# Patient Record
Sex: MALE | Race: WHITE | NOT HISPANIC OR LATINO | Employment: OTHER | URBAN - METROPOLITAN AREA
[De-identification: names, ages, dates, MRNs, and addresses within clinical notes are randomized per-mention and may not be internally consistent; named-entity substitution may affect disease eponyms.]

---

## 2017-10-31 ENCOUNTER — ALLSCRIPTS OFFICE VISIT (OUTPATIENT)
Dept: OTHER | Facility: OTHER | Age: 82
End: 2017-10-31

## 2017-11-01 NOTE — PROGRESS NOTES
Assessment  1  Chronic kidney disease, stage 3 (585 3) (N18 3)   2  Hypertension (401 9) (I10)    Plan  Chronic kidney disease, stage 3    · (1) BASIC METABOLIC PROFILE; Status:Active; Requested for:13Nov2017;    Perform:[facility]; YJP:[id number]; Ordered; For:Chronic kidney disease, stage 3; Ordered By:Willie Aguila;  Chronic kidney disease, stage 3, Hypertension    · Follow-up visit in 4 Months Evaluation and Treatment  Follow-up  Status: Hold For -  Scheduling  Requested for: 31Oct2017   Ordered; For: Chronic kidney disease, stage 3, Hypertension; Ordered By: Asher Luz Performed:  Due: [id number]   · (1) CBC/ PLT (NO DIFF); Status:Active; Requested UPT:[id number]; Perform:[facility]; SOHEILA:[id number]; For:Chronic kidney disease, stage 3, Hypertension; Ordered By:Willie Aguila;   · (1) COMPREHENSIVE METABOLIC PANEL; Status:Active; Requested JKX:[id number]; Perform:[facility]; CLS:[id number]; For:Chronic kidney disease, stage 3, Hypertension; Ordered By:Willie Aguila;   · (1) MAGNESIUM; Status:Active; Requested LLE:[id number]; Perform:[facility]; ASR:[id number]; For:Chronic kidney disease, stage 3, Hypertension; Ordered By:Willie Aguila;   · (1) PHOSPHORUS; Status:Active; Requested HEW:[id number]; Perform:[facility]; AML:[id number]; For:Chronic kidney disease, stage 3, Hypertension; Ordered By:Willie Aguila;   · (1) URINALYSIS w URINE C/S REFLEX (will reflex a microscopy if leukocytes, occult  blood, or nitrites are not within normal limits); Status:Active; Requested MCM:[id number]; Perform:[facility]; ZSN:[id number];ZOOOXIU; For:Chronic kidney disease, stage 3, Hypertension; Ordered By:Willie Aguila;   · (1) URINE PROTEIN CREATININE RATIO; Status:Active; Requested NO:[id number]; Perform: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  Hypertension: BP is under acceptable control  However, will stop Lisinopril due to rise in creatinine  Continue with Metoprolol  Mineral and bone disorder: PTH and Vitamin D level are at goal    TIA, CAD s/p CABG, HLP  Stop Lisinopril  Repeat BMP in November 2017  Follow up in 4 months  The patient was counseled regarding diagnostic results,-- instructions for management,-- patient and family education,-- impressions  The treatment plan was reviewed with the patient/guardian  The patient/guardian understands and agrees with the treatment plan      Reason For Visit  Continued evaluation of CKD, hypertension, and associated complications  History of Present Illness  Mr Nomi Beck was last seen in the office back in October 2016  He reports that he has been doing relatively well over the past year and denies any recent hospitalizations or ER visits  He denies any current complaints except for knee pain  His medications are unchanged  Review of Systems    Constitutional: no fever,-- no chills,-- no anorexia-- and-- no fatigue  Integumentary: no rashes  Gastrointestinal: no abdominal pain,-- no constipation,-- no nausea,-- no diarrhea-- and-- no vomiting  Respiratory: cough, but-- no shortness of breath-- and-- not coughing up sputum  Cardiovascular: no orthopnea,-- no chest pain,-- no palpitations-- and-- no lower extremity edema     Musculoskeletal: joint pain  Neurological: no headache,-- no lightheadedness-- and-- no dizziness  Current Meds   1  Aspirin 81 MG TABS; TAKE 1 TABLET DAILY; Therapy: (Recorded:09Jul2013) to Recorded   2  Fish Oil 1200 MG Oral Capsule; take 1 capsule daily; Therapy: (Recorded:09Jul2013) to Recorded   3  Lisinopril 5 MG Oral Tablet; TAKE ONE TABLET BY MOUTH ONCE DAILY; Therapy: [id number] to (Evaluate:[id number])  Requested for: 14Apr2015; Last   Rx:14Apr2015 Ordered   4  Metoprolol Tartrate 50 MG Oral Tablet; TAKE 1 TABLET TWICE DAILY; Therapy: (Recorded:09Jul2013) to Recorded   5  Multi-Vitamin TABS; TAKE 1 TABLET DAILY; Therapy: (Recorded:09Jul2013) to Recorded   6  Plavix 75 MG Oral Tablet; Take 1 tablet daily Recorded   7  Pravastatin Sodium 20 MG Oral Tablet; TAKE 1 TABLET DAILY; Therapy: (Recorded:09Jul2013) to Recorded    Allergies  1  loratadine   2  nitrofurantoin   3  Penicillins   4  phenazopyridine    Vitals  Vital Signs    Recorded: [id number] 02:50PM Recorded: 20UHU7789 74:63EL   Systolic 845, LUE, Sitting    Diastolic 80, LUE, Sitting    Height  5 ft 5 in   Weight  134 lb    BMI Calculated  22 3   BSA Calculated  1 67     Physical Exam    Constitutional: General appearance: No acute distress, well appearing and well nourished  ENT: External ears and nose appear normal      Eyes: Anicteric sclerae  JVD:  No JVD present  Pulmonary: Respiratory effort: No increased work of breathing or signs of respiratory distress  -- Auscultation of lungs: Clear to auscultation  Cardiovascular: Distinct S1, S2, normal rate, regular rhythm  Abdomen:  Soft  Extremities:  No edema  Rash: No rash present     Neurologic: Non Focal      Psychiatric: Orientation to person, place, and time: Normal  -- and-- Mood and affect: Normal        Results/Data  Nephrology Flowsheet [id number] 04:14PM Marcelo Ernandez     Test Name Result Flag Reference   WBC 9 1     Hemoglobin 15 4     Hematocrit 44 9     Platelets 138 Sodium 139     Potassium 4 5     Chloride 100     Carbon Dioxide 28     Calcium 9 4     GLUCOSE 78     BUN 29     Serum Creatinine 2 19     GFR, NON-AFRICAN AMERICAN 26     Magnesium 2 2     Albumin 3 9     AST 16     ALT 26     Alkaline Phosphatase 75     PTH 27 7     25 OH VIT D 42     Urinalysis Date 10/30/17         Signatures   Electronically signed by : Ignacio Cervantes MD; Oct 31 2017  4:41PM EST                       (Author)

## 2017-11-13 DIAGNOSIS — N18.30 CHRONIC KIDNEY DISEASE, STAGE III (MODERATE) (HCC): ICD-10-CM

## 2018-01-12 VITALS
DIASTOLIC BLOOD PRESSURE: 80 MMHG | BODY MASS INDEX: 22.33 KG/M2 | SYSTOLIC BLOOD PRESSURE: 136 MMHG | WEIGHT: 134 LBS | HEIGHT: 65 IN

## 2018-03-01 DIAGNOSIS — N18.30 CHRONIC KIDNEY DISEASE, STAGE III (MODERATE) (HCC): ICD-10-CM

## 2018-03-01 DIAGNOSIS — I10 ESSENTIAL (PRIMARY) HYPERTENSION: ICD-10-CM

## 2018-04-01 DIAGNOSIS — N18.30 CHRONIC KIDNEY DISEASE, STAGE III (MODERATE) (HCC): ICD-10-CM

## 2018-04-26 ENCOUNTER — TELEPHONE (OUTPATIENT)
Dept: NEPHROLOGY | Facility: CLINIC | Age: 83
End: 2018-04-26

## 2018-04-26 NOTE — TELEPHONE ENCOUNTER
I called patient 3 times to schedule follow up appt with Dr Nkechi Beasley, sent patient a letter that we've been trying to reach him and to call office to schedule appt   4/26/18

## 2018-05-15 ENCOUNTER — OFFICE VISIT (OUTPATIENT)
Dept: NEPHROLOGY | Facility: CLINIC | Age: 83
End: 2018-05-15
Payer: MEDICARE

## 2018-05-15 VITALS
DIASTOLIC BLOOD PRESSURE: 76 MMHG | BODY MASS INDEX: 25.72 KG/M2 | WEIGHT: 131 LBS | SYSTOLIC BLOOD PRESSURE: 132 MMHG | HEIGHT: 60 IN

## 2018-05-15 DIAGNOSIS — N18.30 CHRONIC KIDNEY DISEASE, STAGE 3 (HCC): Primary | ICD-10-CM

## 2018-05-15 DIAGNOSIS — I10 BENIGN ESSENTIAL HYPERTENSION: ICD-10-CM

## 2018-05-15 PROCEDURE — 99213 OFFICE O/P EST LOW 20 MIN: CPT | Performed by: INTERNAL MEDICINE

## 2018-05-15 RX ORDER — AMOXICILLIN 500 MG
1 CAPSULE ORAL DAILY
COMMUNITY
End: 2021-12-08

## 2018-05-15 RX ORDER — IBUPROFEN 200 MG
600 CAPSULE ORAL
COMMUNITY
Start: 2012-01-05 | End: 2022-04-07 | Stop reason: HOSPADM

## 2018-05-15 RX ORDER — ASPIRIN 81 MG/1
1 TABLET ORAL DAILY
COMMUNITY
End: 2022-06-24 | Stop reason: ALTCHOICE

## 2018-05-15 RX ORDER — CLOPIDOGREL BISULFATE 75 MG/1
1 TABLET ORAL DAILY
COMMUNITY
End: 2021-12-08 | Stop reason: SDUPTHER

## 2018-05-15 RX ORDER — METOPROLOL TARTRATE 50 MG/1
1 TABLET, FILM COATED ORAL 2 TIMES DAILY
COMMUNITY
End: 2021-12-08 | Stop reason: SDUPTHER

## 2018-05-15 RX ORDER — PRAVASTATIN SODIUM 20 MG
1 TABLET ORAL DAILY
COMMUNITY
End: 2021-12-08 | Stop reason: SDUPTHER

## 2018-05-15 NOTE — PROGRESS NOTES
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  · He has no to minimal proteinuria  2  Hypertension: BP is at goal  Continue Metoprolol  3  Mineral and bone disorder: Check PTH and Vitamin D with next visit  · Ca and Phos are at goal      Patient Instructions   No change to medications today  Follow up in 6 months  SUBJECTIVE / INTERVAL HISTORY:  Mr Adrián Valdez was last seen in the office back in October 2017 at which time we stopped lisinopril due to a rise in his serum creatinine  Since that time, there have been no recent hospitalizations or ER visits  He had labs done in early May 2018 which showed stable renal function  He feels relatively well  His only complaint is leg weakness when he mows the lawn  PMH/PSH: HTN, HLP, TIA, CAD s/p CABG  ALLERGIES:   Allergies   Allergen Reactions    Loratadine     Nitrofurantoin     Penicillins     Phenazopyridine      REVIEW OF SYSTEMS:  Review of Systems   Constitutional: Negative for appetite change, fatigue and fever  Respiratory: Negative for cough and shortness of breath  Cardiovascular: Negative for chest pain and leg swelling  Gastrointestinal: Negative for diarrhea, nausea and vomiting  Genitourinary: Negative for dysuria and hematuria  Musculoskeletal: Positive for myalgias  Negative for arthralgias  Neurological: Negative for light-headedness       OBJECTIVE:  /76   Ht 5' (1 524 m)   Wt 59 4 kg (131 lb)   BMI 25 58 kg/m²   Current Weight: Weight - Scale: 59 4 kg (131 lb) Body mass index is 25 58 kg/m²  Physical Exam   Constitutional: He is oriented to person, place, and time  He appears well-developed and well-nourished  No distress  HENT:   Head: Normocephalic and atraumatic  Mouth/Throat: Mucous membranes are normal    Eyes: Conjunctivae are normal  No scleral icterus  Neck: Neck supple  No JVD present  Cardiovascular: Normal rate, regular rhythm and normal heart sounds  Pulmonary/Chest: Effort normal and breath sounds normal  No respiratory distress  Abdominal: Soft  Bowel sounds are normal  He exhibits no distension  Musculoskeletal: He exhibits no edema  Neurological: He is alert and oriented to person, place, and time  Skin: Skin is warm and dry  Psychiatric: He has a normal mood and affect  His behavior is normal      Medications:  Current Outpatient Prescriptions:     aspirin (ASPIRIN LOW DOSE) 81 mg EC tablet, Take 1 tablet by mouth daily, Disp: , Rfl:     calcium carbonate (OS-VIRGEN) 1250 (500 Ca) MG tablet, Take 600 mg by mouth, Disp: , Rfl:     clopidogrel (PLAVIX) 75 mg tablet, Take 1 tablet by mouth daily, Disp: , Rfl:     metoprolol tartrate (LOPRESSOR) 50 mg tablet, Take 1 tablet by mouth 2 (two) times a day, Disp: , Rfl:     Omega-3 Fatty Acids (FISH OIL) 1200 MG CAPS, Take 1 capsule by mouth daily, Disp: , Rfl:     pravastatin (PRAVACHOL) 20 mg tablet, Take 1 tablet by mouth daily, Disp: , Rfl:     Laboratory Results:  May 1, 2018:  Upc ratio 0 18, glucose 92, BUN 30, creatinine 1 83, sodium 137, potassium 4 4, chloride 104, carbon dioxide 28, calcium 9 2, albumin 3 7, alkaline phosphatase 78, bilirubin 0 5, AST 25, ALT 39, total protein 6 9, magnesium 2 3, phosphorus 3 0, hemoglobin 14 4, WBC 8 5, platelets 364

## 2018-05-15 NOTE — LETTER
May 15, 2018     Haider Moralez DO  [address] [place] [address]    Patient: Adriane Gomez   YOB: 1926   Date of Visit: 5/15/2018       Dear Dr Ludwig Jimenez: Thank you for referring Landon Thomas to me for evaluation  Below are my notes for this consultation  If you have questions, please do not hesitate to call me  I look forward to following your patient along with you  Sincerely,        Zora Ang MD        CC: MD Zora Judge MD  5/15/2018  4:16 PM  Sign at close encounter  [address] 80 y o  male MRN: [id number]  DATE: 05/15/18  Reason for visit: Continued evaluation of CKD    ASSESSMENT & PLAN:  1  Chronic kidney disease, stage IIIB :  · Mr Cuevas Prime baseline creatinine is around 1 7 to 1 8    · His most recent creatinine is 1 83 reflecting stable renal function  · 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  · He has no to minimal proteinuria  2  Hypertension: BP is at goal  Continue Metoprolol  3  Mineral and bone disorder: Check PTH and Vitamin D with next visit  · Ca and Phos are at goal      Patient Instructions   No change to medications today  Follow up in 6 months  SUBJECTIVE / INTERVAL HISTORY:  Mr Amilcar Salas was last seen in the office back in October 2017 at which time we stopped lisinopril due to a rise in his serum creatinine  Since that time, there have been no recent hospitalizations or ER visits  He had labs done in early May 2018 which showed stable renal function  He feels relatively well  His only complaint is leg weakness when he mows the lawn  PMH/PSH: HTN, HLP, TIA, CAD s/p CABG       ALLERGIES:   Allergies   Allergen Reactions    Loratadine     Nitrofurantoin     Penicillins     Phenazopyridine      REVIEW OF SYSTEMS:  Review of Systems Constitutional: Negative for appetite change, fatigue and fever  Respiratory: Negative for cough and shortness of breath  Cardiovascular: Negative for chest pain and leg swelling  Gastrointestinal: Negative for diarrhea, nausea and vomiting  Genitourinary: Negative for dysuria and hematuria  Musculoskeletal: Positive for myalgias  Negative for arthralgias  Neurological: Negative for light-headedness  OBJECTIVE:  /76   Ht 5' (1 524 m)   Wt 59 4 kg (131 lb)   BMI 25 58 kg/m²    Current Weight: Weight - Scale: 59 4 kg (131 lb) Body mass index is 25 58 kg/m²  Physical Exam   Constitutional: He is oriented to person, place, and time  He appears well-developed and well-nourished  No distress  HENT:   Head: Normocephalic and atraumatic  Mouth/Throat: Mucous membranes are normal    Eyes: Conjunctivae are normal  No scleral icterus  Neck: Neck supple  No JVD present  Cardiovascular: Normal rate, regular rhythm and normal heart sounds  Pulmonary/Chest: Effort normal and breath sounds normal  No respiratory distress  Abdominal: Soft  Bowel sounds are normal  He exhibits no distension  Musculoskeletal: He exhibits no edema  Neurological: He is alert and oriented to person, place, and time  Skin: Skin is warm and dry  Psychiatric: He has a normal mood and affect   His behavior is normal      Medications:  Current Outpatient Prescriptions:     aspirin (ASPIRIN LOW DOSE) 81 mg EC tablet, Take 1 tablet by mouth daily, Disp: , Rfl:     calcium carbonate (OS-VIRGEN) 1250 (500 Ca) MG tablet, Take 600 mg by mouth, Disp: , Rfl:     clopidogrel (PLAVIX) 75 mg tablet, Take 1 tablet by mouth daily, Disp: , Rfl:     metoprolol tartrate (LOPRESSOR) 50 mg tablet, Take 1 tablet by mouth 2 (two) times a day, Disp: , Rfl:     Omega-3 Fatty Acids (FISH OIL) 1200 MG CAPS, Take 1 capsule by mouth daily, Disp: , Rfl:     pravastatin (PRAVACHOL) 20 mg tablet, Take 1 tablet by mouth daily, Disp: , Rfl:     Laboratory Results:  May 1, 2018:  Upc ratio 0 18, glucose 92, BUN 30, creatinine 1 83, sodium 137, potassium 4 4, chloride 104, carbon dioxide 28, calcium 9 2, albumin 3 7, alkaline phosphatase 78, bilirubin 0 5, AST 25, ALT 39, total protein 6 9, magnesium 2 3, phosphorus 3 0, hemoglobin 14 4, WBC 8 5, platelets 762

## 2018-05-22 LAB
EXTERNAL ALBUMIN: 3.7
EXTERNAL ALK PHOS: 78
EXTERNAL ALT: 39
EXTERNAL AST: 25
EXTERNAL BICARBONATE: 28
EXTERNAL BUN: 30
EXTERNAL CALCIUM: 9.2
EXTERNAL CHLORIDE: 104
EXTERNAL CREATININE: 1.83
EXTERNAL EGFR: 32
EXTERNAL POTASSIUM: 4.4
EXTERNAL SODIUM: 137
HCT VFR BLD AUTO: 41.7 % (ref 36.5–49.3)
HGB BLD-MCNC: 14.4 G/DL (ref 12–17)
MAGNESIUM SERPL-MCNC: 2.3 MG/DL (ref 1.6–2.6)
PHOSPHATE SERPL-MCNC: 3 MG/DL (ref 2.3–4.1)
PLATELET # BLD AUTO: 169 THOUSANDS/UL (ref 149–390)
PROTEIN/CREAT RATIO (HISTORICAL): 0.18
WBC # BLD AUTO: 8.5 THOUSAND/UL

## 2018-10-01 DIAGNOSIS — N18.30 CHRONIC KIDNEY DISEASE, STAGE III (MODERATE) (HCC): ICD-10-CM

## 2018-11-28 ENCOUNTER — OFFICE VISIT (OUTPATIENT)
Dept: NEPHROLOGY | Facility: CLINIC | Age: 83
End: 2018-11-28
Payer: MEDICARE

## 2018-11-28 VITALS
BODY MASS INDEX: 25.52 KG/M2 | WEIGHT: 130 LBS | SYSTOLIC BLOOD PRESSURE: 134 MMHG | DIASTOLIC BLOOD PRESSURE: 80 MMHG | HEIGHT: 60 IN

## 2018-11-28 DIAGNOSIS — N18.30 CHRONIC KIDNEY DISEASE, STAGE 3 (HCC): Primary | ICD-10-CM

## 2018-11-28 DIAGNOSIS — I12.9 BENIGN HYPERTENSIVE CKD, STAGE 1-4 OR UNSPECIFIED CHRONIC KIDNEY DISEASE: ICD-10-CM

## 2018-11-28 PROCEDURE — 99213 OFFICE O/P EST LOW 20 MIN: CPT | Performed by: INTERNAL MEDICINE

## 2018-11-28 NOTE — PROGRESS NOTES
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  No proteinuria  · He has no new labs and I am unable to comment on his current renal function  I asked him to go for labs this week  2  Hypertension: BP is at goal with Metoprolol 50 mg BID  3  Mineral and bone disorder:  · Check PTH and Vitamin D this week  Patient Instructions   No change to medications  Check blood work now and in 6 months  Follow up in 6 months  SUBJECTIVE / INTERVAL HISTORY:  Mr Guy East was last seen in the office back in May 2018  He appears to be doing fairly well  He denies any acute complaints at this time  Unfortunately, he has not gone for the repeat lab work prior to the appointment  PMH/PSH: HTN, HLP, TIA, CAD s/p CABG  ALLERGIES:   Allergies   Allergen Reactions    Loratadine     Nitrofurantoin     Penicillins     Phenazopyridine      REVIEW OF SYSTEMS:  Review of Systems   Constitutional: Negative for appetite change, chills, fatigue and fever  Respiratory: Negative for cough and shortness of breath  Cardiovascular: Negative for chest pain and leg swelling  Gastrointestinal: Negative for abdominal pain, diarrhea, nausea and vomiting  Genitourinary: Negative for dysuria and hematuria  Musculoskeletal: Positive for arthralgias  Allergic/Immunologic: Negative for immunocompromised state  Neurological: Negative for light-headedness  OBJECTIVE:  /80   Ht 5' (1 524 m)   Wt 59 kg (130 lb)   BMI 25 39 kg/m²   Current Weight: Weight - Scale: 59 kg (130 lb) Body mass index is 25 39 kg/m²  Physical Exam   Constitutional: He is oriented to person, place, and time   He appears well-developed and well-nourished  HENT:   Head: Normocephalic and atraumatic  Mouth/Throat: Mucous membranes are normal    Eyes: Conjunctivae are normal  No scleral icterus  Neck: Neck supple  No JVD present  Cardiovascular: Normal rate, regular rhythm and normal heart sounds  Pulmonary/Chest: Effort normal and breath sounds normal    Abdominal: Soft  Bowel sounds are normal    Musculoskeletal: He exhibits no edema  Neurological: He is alert and oriented to person, place, and time  Skin: Skin is warm and dry  He is not diaphoretic  Psychiatric: He has a normal mood and affect  His behavior is normal  Judgment normal      Medications:  Current Outpatient Prescriptions:     aspirin (ASPIRIN LOW DOSE) 81 mg EC tablet, Take 1 tablet by mouth daily, Disp: , Rfl:     calcium carbonate (OS-VIRGEN) 1250 (500 Ca) MG tablet, Take 600 mg by mouth, Disp: , Rfl:     clopidogrel (PLAVIX) 75 mg tablet, Take 1 tablet by mouth daily, Disp: , Rfl:     metoprolol tartrate (LOPRESSOR) 50 mg tablet, Take 1 tablet by mouth 2 (two) times a day, Disp: , Rfl:     Omega-3 Fatty Acids (FISH OIL) 1200 MG CAPS, Take 1 capsule by mouth daily, Disp: , Rfl:     pravastatin (PRAVACHOL) 20 mg tablet, Take 1 tablet by mouth daily, Disp: , Rfl:     Laboratory Results:  No new labs

## 2018-11-28 NOTE — LETTER
November 28, 2018     Vincent Paolonehemias, DO  [address] [place] [address]    Patient: Favian Barone   YOB: 1926   Date of Visit: 11/28/2018       Dear Dr Ewa Donovan: Thank you for referring Fadi Lipoma to me for evaluation  Below are my notes for this consultation  If you have questions, please do not hesitate to call me  I look forward to following your patient along with you  Sincerely,        Cindy Cervantes MD        CC: No Recipients  Cindy Cervantes MD  11/28/2018 11:36 AM  Sign at close encounter  [address] 80 y o  male MRN: [id number]  DATE: 11/28/18  Reason for visit: Continued evaluation of CKD    ASSESSMENT & PLAN:  1  Chronic kidney disease, stage IIIB :  · Mr Jaida Torres baseline creatinine is around 1 7 to 1 8    · His CKD is felt to be due to 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  No proteinuria  · He has no new labs and I am unable to comment on his current renal function  I asked him to go for labs this week  2  Hypertension: BP is at goal with Metoprolol 50 mg BID  3  Mineral and bone disorder:  · Check PTH and Vitamin D this week  Patient Instructions   No change to medications  Check blood work now and in 6 months  Follow up in 6 months  SUBJECTIVE / INTERVAL HISTORY:  Mr Tayla Nicholas was last seen in the office back in May 2018  He appears to be doing fairly well  He denies any acute complaints at this time  Unfortunately, he has not gone for the repeat lab work prior to the appointment  PMH/PSH: HTN, HLP, TIA, CAD s/p CABG  ALLERGIES:   Allergies   Allergen Reactions    Loratadine     Nitrofurantoin     Penicillins     Phenazopyridine      REVIEW OF SYSTEMS:  Review of Systems   Constitutional: Negative for appetite change, chills, fatigue and fever  Respiratory: Negative for cough and shortness of breath      Cardiovascular: Negative for chest pain and leg swelling  Gastrointestinal: Negative for abdominal pain, diarrhea, nausea and vomiting  Genitourinary: Negative for dysuria and hematuria  Musculoskeletal: Positive for arthralgias  Allergic/Immunologic: Negative for immunocompromised state  Neurological: Negative for light-headedness  OBJECTIVE:  /80   Ht 5' (1 524 m)   Wt 59 kg (130 lb)   BMI 25 39 kg/m²    Current Weight: Weight - Scale: 59 kg (130 lb) Body mass index is 25 39 kg/m²  Physical Exam   Constitutional: He is oriented to person, place, and time  He appears well-developed and well-nourished  HENT:   Head: Normocephalic and atraumatic  Mouth/Throat: Mucous membranes are normal    Eyes: Conjunctivae are normal  No scleral icterus  Neck: Neck supple  No JVD present  Cardiovascular: Normal rate, regular rhythm and normal heart sounds  Pulmonary/Chest: Effort normal and breath sounds normal    Abdominal: Soft  Bowel sounds are normal    Musculoskeletal: He exhibits no edema  Neurological: He is alert and oriented to person, place, and time  Skin: Skin is warm and dry  He is not diaphoretic  Psychiatric: He has a normal mood and affect  His behavior is normal  Judgment normal      Medications:  Current Outpatient Prescriptions:     aspirin (ASPIRIN LOW DOSE) 81 mg EC tablet, Take 1 tablet by mouth daily, Disp: , Rfl:     calcium carbonate (OS-VIRGEN) 1250 (500 Ca) MG tablet, Take 600 mg by mouth, Disp: , Rfl:     clopidogrel (PLAVIX) 75 mg tablet, Take 1 tablet by mouth daily, Disp: , Rfl:     metoprolol tartrate (LOPRESSOR) 50 mg tablet, Take 1 tablet by mouth 2 (two) times a day, Disp: , Rfl:     Omega-3 Fatty Acids (FISH OIL) 1200 MG CAPS, Take 1 capsule by mouth daily, Disp: , Rfl:     pravastatin (PRAVACHOL) 20 mg tablet, Take 1 tablet by mouth daily, Disp: , Rfl:     Laboratory Results:  No new labs

## 2018-12-05 ENCOUNTER — TELEPHONE (OUTPATIENT)
Dept: NEPHROLOGY | Facility: CLINIC | Age: 83
End: 2018-12-05

## 2018-12-05 DIAGNOSIS — N18.30 CKD (CHRONIC KIDNEY DISEASE), STAGE III (HCC): Primary | ICD-10-CM

## 2018-12-05 NOTE — TELEPHONE ENCOUNTER
Mailed to pt    ----- Message from Jack Elise MD sent at 12/5/2018  2:21 PM EST -----  Please have patient repeat BMP in March 2018  No change to meds

## 2019-04-12 ENCOUNTER — TELEPHONE (OUTPATIENT)
Dept: NEPHROLOGY | Facility: CLINIC | Age: 84
End: 2019-04-12

## 2019-05-03 ENCOUNTER — TELEPHONE (OUTPATIENT)
Dept: NEPHROLOGY | Facility: CLINIC | Age: 84
End: 2019-05-03

## 2019-05-31 ENCOUNTER — OFFICE VISIT (OUTPATIENT)
Dept: NEPHROLOGY | Facility: CLINIC | Age: 84
End: 2019-05-31
Payer: MEDICARE

## 2019-05-31 VITALS
BODY MASS INDEX: 25.84 KG/M2 | SYSTOLIC BLOOD PRESSURE: 128 MMHG | DIASTOLIC BLOOD PRESSURE: 76 MMHG | HEIGHT: 60 IN | WEIGHT: 131.6 LBS

## 2019-05-31 DIAGNOSIS — N18.30 CKD (CHRONIC KIDNEY DISEASE), STAGE III (HCC): Primary | ICD-10-CM

## 2019-05-31 DIAGNOSIS — N18.30 CHRONIC KIDNEY DISEASE, STAGE 3 (HCC): ICD-10-CM

## 2019-05-31 PROCEDURE — 99213 OFFICE O/P EST LOW 20 MIN: CPT | Performed by: INTERNAL MEDICINE

## 2019-10-07 ENCOUNTER — TELEPHONE (OUTPATIENT)
Dept: NEPHROLOGY | Facility: CLINIC | Age: 84
End: 2019-10-07

## 2019-10-07 NOTE — TELEPHONE ENCOUNTER
I called and left message for patient to call back and schedule follow up appointment in November w/ Dr Chanell De Jesus

## 2019-11-19 DIAGNOSIS — N18.30 CKD (CHRONIC KIDNEY DISEASE), STAGE III (HCC): Primary | ICD-10-CM

## 2019-11-19 DIAGNOSIS — E55.9 VITAMIN D DEFICIENCY: ICD-10-CM

## 2019-11-19 DIAGNOSIS — E21.3 HYPERPARATHYROIDISM (HCC): ICD-10-CM

## 2019-11-19 LAB
CREAT ?TM UR-SCNC: 90.8 UMOL/L
EXT PROTEIN URINE: 19.6
PROT/CREAT UR: 0.22 MG/G{CREAT}

## 2020-01-17 DIAGNOSIS — N18.30 CKD (CHRONIC KIDNEY DISEASE), STAGE III (HCC): Primary | ICD-10-CM

## 2020-01-17 LAB
25(OH)D3 SERPL-MCNC: 49 NG/ML (ref 30–100)
PTH-INTACT SERPL-MCNC: 38.4 PG/ML

## 2020-01-20 ENCOUNTER — OFFICE VISIT (OUTPATIENT)
Dept: NEPHROLOGY | Facility: CLINIC | Age: 85
End: 2020-01-20
Payer: MEDICARE

## 2020-01-20 VITALS
BODY MASS INDEX: 26.11 KG/M2 | HEIGHT: 60 IN | WEIGHT: 133 LBS | DIASTOLIC BLOOD PRESSURE: 61 MMHG | HEART RATE: 57 BPM | SYSTOLIC BLOOD PRESSURE: 146 MMHG

## 2020-01-20 DIAGNOSIS — N18.30 CKD (CHRONIC KIDNEY DISEASE), STAGE III (HCC): Primary | ICD-10-CM

## 2020-01-20 LAB
EXT GLUCOSE BLD: 100
EXTERNAL BUN: 36
EXTERNAL CALCIUM: 9.2
EXTERNAL CHLORIDE: 101
EXTERNAL CO2: 28
EXTERNAL CREATININE: 2.1
EXTERNAL EGFR: 26
EXTERNAL POTASSIUM: 4.7
EXTERNAL SODIUM: 136

## 2020-01-20 PROCEDURE — 99213 OFFICE O/P EST LOW 20 MIN: CPT | Performed by: INTERNAL MEDICINE

## 2020-01-20 RX ORDER — MELATONIN
1000 DAILY
COMMUNITY
End: 2022-03-10

## 2020-01-20 NOTE — LETTER
January 20, 2020     Lennie Guerra DO  [address] [place] [address]    Patient: Roberto Casillas   YOB: 1926   Date of Visit: 1/20/2020       Dear Dr Rudy Dyer: Thank you for referring Sheila Porter to me for evaluation  Below are my notes for this consultation  If you have questions, please do not hesitate to call me  I look forward to following your patient along with you  Sincerely,        Guillermina Melo MD        CC: No Recipients  Guillermina Melo MD  1/20/2020  1:31 PM  Sign at close encounter  [address] 80 y o  male MRN: [id number]  DATE: 01/20/20  Reason for visit: Continued evaluation of CKD    ASSESSMENT & PLAN:  1  Chronic kidney disease, stage IIIB :  · Mr Rome Jim baseline creatinine is around 1 7 to 1 9    · His CKD is felt to be due to 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  No to minimal proteinuria  · His most recent SCr is up to 2 10 - I suspect that this may simply be a prerenal issue  · I encouraged him to stay hydrated and we will repeat labs in the beginning of Feb 2020  2  Hypertension:  · BP is acceptable given age with Metoprolol 50 mg BID  · Off Lisinopril since 10/2017    3  Mineral and bone disorder:  · PTH is at goal - 38 4 on 11/19/2019  · Vitamin D level is at goal - 49 in 11/19/2019  He is taking OTC Vitamin D 1000 units daily        Patient Instructions   Try to stay hydrated  Repeat BMP in 2 weeks  Follow up in 6 months  SUBJECTIVE / INTERVAL HISTORY:  Mr Edwin Manzo was last seen in the office back in May 2019  He denies any acute events since his last office visit  No recent hospitalizations or ER visits  He had recent cataract surgery  Repeat labs in on January 17 showed a serum creatinine of 2 1  He denies any nausea, vomiting, diarrhea  No NSAID use  PMH/PSH: HTN, HLP, TIA, CAD s/p CABG       ALLERGIES:   Allergies   Allergen Reactions  Loratadine     Nitrofurantoin     Penicillins     Phenazopyridine      REVIEW OF SYSTEMS:  Review of Systems   Constitutional: Negative for appetite change, fatigue and fever  Respiratory: Negative for cough and shortness of breath  Cardiovascular: Negative for chest pain and leg swelling  Gastrointestinal: Negative for abdominal pain, diarrhea, nausea and vomiting  Genitourinary: Negative for dysuria and hematuria  Musculoskeletal: Negative for arthralgias  Allergic/Immunologic: Negative for immunocompromised state  Neurological: Negative for dizziness and light-headedness  OBJECTIVE:  /61 (BP Location: Left arm, Patient Position: Sitting, Cuff Size: Standard)   Pulse 57   Ht 5' (1 524 m)   Wt 60 3 kg (133 lb)   BMI 25 97 kg/m²    Current Weight: Weight - Scale: 60 3 kg (133 lb) Body mass index is 25 97 kg/m²  Physical Exam   Constitutional: He is oriented to person, place, and time  He appears well-developed and well-nourished  No distress  HENT:   Head: Normocephalic and atraumatic  Mouth/Throat: Mucous membranes are normal    Eyes: Conjunctivae are normal  No scleral icterus  Neck: Neck supple  No JVD present  Cardiovascular: Normal rate, regular rhythm and normal heart sounds  Pulmonary/Chest: Effort normal and breath sounds normal  No respiratory distress  Abdominal: Soft  Bowel sounds are normal    Musculoskeletal: He exhibits no edema  Neurological: He is alert and oriented to person, place, and time  Skin: Skin is warm and dry  Psychiatric: He has a normal mood and affect   His behavior is normal  Judgment normal      Medications:  Current Outpatient Medications:     aspirin (ASPIRIN LOW DOSE) 81 mg EC tablet, Take 1 tablet by mouth daily, Disp: , Rfl:     calcium carbonate (OS-VIRGEN) 1250 (500 Ca) MG tablet, Take 600 mg by mouth, Disp: , Rfl:     cholecalciferol (VITAMIN D3) 1,000 units tablet, Take 1,000 Units by mouth daily, Disp: , Rfl:    clopidogrel (PLAVIX) 75 mg tablet, Take 1 tablet by mouth daily, Disp: , Rfl:     metoprolol tartrate (LOPRESSOR) 50 mg tablet, Take 1 tablet by mouth 2 (two) times a day, Disp: , Rfl:     Omega-3 Fatty Acids (FISH OIL) 1200 MG CAPS, Take 1 capsule by mouth daily, Disp: , Rfl:     pravastatin (PRAVACHOL) 20 mg tablet, Take 1 tablet by mouth daily, Disp: , Rfl:     Laboratory Results:  Results for orders placed or performed in visit on 17/59/42   Basic metabolic panel   Result Value Ref Range    SODIUM 136     POTASSIUM 4 7     CHLORIDE 101     CO2 28     BUN 36     CREATININE 2 10     Glucose 100     EXTERNAL CALCIUM 9 2     EXTERNAL EGFR 26

## 2020-01-20 NOTE — PROGRESS NOTES
NEPHROLOGY OFFICE PROGRESS NOTE   Ebonie Wakefield 80 y o  male MRN: [id number]  DATE: 01/20/20  Reason for visit: Continued evaluation of CKD    ASSESSMENT & PLAN:  1  Chronic kidney disease, stage IIIB :  · Mr Brianda Theodore baseline creatinine is around 1 7 to 1 9    · His CKD is felt to be due to 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  No to minimal proteinuria  · His most recent SCr is up to 2 10 - I suspect that this may simply be a prerenal issue  · I encouraged him to stay hydrated and we will repeat labs in the beginning of Feb 2020  2  Hypertension:  · BP is acceptable given age with Metoprolol 50 mg BID  · Off Lisinopril since 10/2017    3  Mineral and bone disorder:  · PTH is at goal - 38 4 on 11/19/2019  · Vitamin D level is at goal - 49 in 11/19/2019  He is taking OTC Vitamin D 1000 units daily        Patient Instructions   Try to stay hydrated  Repeat BMP in 2 weeks  Follow up in 6 months  SUBJECTIVE / INTERVAL HISTORY:  Mr Merritt Hatchet was last seen in the office back in May 2019  He denies any acute events since his last office visit  No recent hospitalizations or ER visits  He had recent cataract surgery  Repeat labs in on January 17 showed a serum creatinine of 2 1  He denies any nausea, vomiting, diarrhea  No NSAID use  PMH/PSH: HTN, HLP, TIA, CAD s/p CABG  ALLERGIES:   Allergies   Allergen Reactions    Loratadine     Nitrofurantoin     Penicillins     Phenazopyridine      REVIEW OF SYSTEMS:  Review of Systems   Constitutional: Negative for appetite change, fatigue and fever  Respiratory: Negative for cough and shortness of breath  Cardiovascular: Negative for chest pain and leg swelling  Gastrointestinal: Negative for abdominal pain, diarrhea, nausea and vomiting  Genitourinary: Negative for dysuria and hematuria  Musculoskeletal: Negative for arthralgias  Allergic/Immunologic: Negative for immunocompromised state  Neurological: Negative for dizziness and light-headedness  OBJECTIVE:  /61 (BP Location: Left arm, Patient Position: Sitting, Cuff Size: Standard)   Pulse 57   Ht 5' (1 524 m)   Wt 60 3 kg (133 lb)   BMI 25 97 kg/m²   Current Weight: Weight - Scale: 60 3 kg (133 lb) Body mass index is 25 97 kg/m²  Physical Exam   Constitutional: He is oriented to person, place, and time  He appears well-developed and well-nourished  No distress  HENT:   Head: Normocephalic and atraumatic  Mouth/Throat: Mucous membranes are normal    Eyes: Conjunctivae are normal  No scleral icterus  Neck: Neck supple  No JVD present  Cardiovascular: Normal rate, regular rhythm and normal heart sounds  Pulmonary/Chest: Effort normal and breath sounds normal  No respiratory distress  Abdominal: Soft  Bowel sounds are normal    Musculoskeletal: He exhibits no edema  Neurological: He is alert and oriented to person, place, and time  Skin: Skin is warm and dry  Psychiatric: He has a normal mood and affect   His behavior is normal  Judgment normal      Medications:  Current Outpatient Medications:     aspirin (ASPIRIN LOW DOSE) 81 mg EC tablet, Take 1 tablet by mouth daily, Disp: , Rfl:     calcium carbonate (OS-VIRGEN) 1250 (500 Ca) MG tablet, Take 600 mg by mouth, Disp: , Rfl:     cholecalciferol (VITAMIN D3) 1,000 units tablet, Take 1,000 Units by mouth daily, Disp: , Rfl:     clopidogrel (PLAVIX) 75 mg tablet, Take 1 tablet by mouth daily, Disp: , Rfl:     metoprolol tartrate (LOPRESSOR) 50 mg tablet, Take 1 tablet by mouth 2 (two) times a day, Disp: , Rfl:     Omega-3 Fatty Acids (FISH OIL) 1200 MG CAPS, Take 1 capsule by mouth daily, Disp: , Rfl:     pravastatin (PRAVACHOL) 20 mg tablet, Take 1 tablet by mouth daily, Disp: , Rfl:     Laboratory Results:  Results for orders placed or performed in visit on 86/23/70   Basic metabolic panel   Result Value Ref Range    SODIUM 136     POTASSIUM 4 7     CHLORIDE 101 CO2 28     BUN 36     CREATININE 2 10     Glucose 100     EXTERNAL CALCIUM 9 2     EXTERNAL EGFR 26

## 2020-04-02 ENCOUNTER — TELEPHONE (OUTPATIENT)
Dept: NEPHROLOGY | Facility: CLINIC | Age: 85
End: 2020-04-02

## 2020-10-20 LAB
EXT GLUCOSE BLD: 92
EXTERNAL ALBUMIN: 4.1
EXTERNAL ALK PHOS: 73
EXTERNAL ALT: 20
EXTERNAL AST: 18
EXTERNAL BICARBONATE: 26
EXTERNAL BUN: 35
EXTERNAL CALCIUM: 9.6
EXTERNAL CHLORIDE: 105
EXTERNAL CREATININE: 1.83
EXTERNAL EGFR: 31
EXTERNAL POTASSIUM: 4.3
EXTERNAL SODIUM: 139

## 2020-10-23 ENCOUNTER — TELEMEDICINE (OUTPATIENT)
Dept: NEPHROLOGY | Facility: CLINIC | Age: 85
End: 2020-10-23
Payer: MEDICARE

## 2020-10-23 VITALS
BODY MASS INDEX: 25.48 KG/M2 | DIASTOLIC BLOOD PRESSURE: 70 MMHG | WEIGHT: 129.8 LBS | HEIGHT: 60 IN | TEMPERATURE: 97.7 F | SYSTOLIC BLOOD PRESSURE: 132 MMHG

## 2020-10-23 DIAGNOSIS — I12.9 BENIGN HYPERTENSIVE CKD, STAGE 1-4 OR UNSPECIFIED CHRONIC KIDNEY DISEASE: ICD-10-CM

## 2020-10-23 DIAGNOSIS — N18.32 STAGE 3B CHRONIC KIDNEY DISEASE (HCC): Primary | ICD-10-CM

## 2020-10-23 PROCEDURE — 99213 OFFICE O/P EST LOW 20 MIN: CPT | Performed by: INTERNAL MEDICINE

## 2020-10-23 RX ORDER — MULTIVITAMIN
1 CAPSULE ORAL DAILY
COMMUNITY
End: 2022-06-24 | Stop reason: ALTCHOICE

## 2021-08-26 ENCOUNTER — OFFICE VISIT (OUTPATIENT)
Dept: NEPHROLOGY | Facility: CLINIC | Age: 86
End: 2021-08-26
Payer: MEDICARE

## 2021-08-26 ENCOUNTER — TELEPHONE (OUTPATIENT)
Dept: NEPHROLOGY | Facility: CLINIC | Age: 86
End: 2021-08-26

## 2021-08-26 VITALS
SYSTOLIC BLOOD PRESSURE: 128 MMHG | DIASTOLIC BLOOD PRESSURE: 72 MMHG | WEIGHT: 127.8 LBS | BODY MASS INDEX: 25.09 KG/M2 | HEIGHT: 60 IN

## 2021-08-26 DIAGNOSIS — E83.9 CHRONIC KIDNEY DISEASE-MINERAL AND BONE DISORDER: ICD-10-CM

## 2021-08-26 DIAGNOSIS — N18.32 STAGE 3B CHRONIC KIDNEY DISEASE (HCC): Primary | ICD-10-CM

## 2021-08-26 DIAGNOSIS — I12.9 BENIGN HYPERTENSIVE CKD, STAGE 1-4 OR UNSPECIFIED CHRONIC KIDNEY DISEASE: ICD-10-CM

## 2021-08-26 DIAGNOSIS — I50.20 HEART FAILURE WITH REDUCED EJECTION FRACTION (HCC): ICD-10-CM

## 2021-08-26 DIAGNOSIS — N18.9 CHRONIC KIDNEY DISEASE-MINERAL AND BONE DISORDER: ICD-10-CM

## 2021-08-26 DIAGNOSIS — M89.9 CHRONIC KIDNEY DISEASE-MINERAL AND BONE DISORDER: ICD-10-CM

## 2021-08-26 PROCEDURE — 99214 OFFICE O/P EST MOD 30 MIN: CPT | Performed by: INTERNAL MEDICINE

## 2021-08-26 RX ORDER — FUROSEMIDE 20 MG/1
20 TABLET ORAL DAILY
COMMUNITY
End: 2021-12-08 | Stop reason: SDUPTHER

## 2021-08-26 RX ORDER — VALSARTAN 40 MG/1
40 TABLET ORAL DAILY
COMMUNITY
End: 2021-12-08 | Stop reason: SDUPTHER

## 2021-08-26 RX ORDER — GLUCOSAMINE/CHONDR SU A SOD 750-600 MG
TABLET ORAL DAILY
COMMUNITY
End: 2022-03-10

## 2021-08-26 NOTE — PATIENT INSTRUCTIONS
No change to medications at this time  We will plan to see you back in 4 months  We can discuss the advanced care planning during that visit

## 2021-08-26 NOTE — LETTER
August 26, 2021     Trey Chung DO  [address] [address]    Patient: Sola Benitez   YOB: 1926   Date of Visit: 8/26/2021       Dear Dr Neeraj Melvin: Thank you for referring Melissa Nichole to me for evaluation  Below are my notes for this consultation  If you have questions, please do not hesitate to call me  I look forward to following your patient along with you  Sincerely,        Don Vickers MD        CC: MD Don Greene MD  8/26/2021 12:31 PM  Sign when Signing Visit  NEPHROLOGY OFFICE PROGRESS NOTE   Sola Benitez 80 y o  male MRN: [id number]  DATE: 08/26/21  Reason for visit: Continued evaluation and management of CKD    ASSESSMENT & PLAN:  1  Chronic kidney disease, stage IIIB :  · Mr Yara Hughes baseline creatinine was around 1 9    · However, his creatinine has settled at around 1 9 to 2 3 since being started on Valsartan and Furosemide in November 2020  · His CKD is felt to be due to 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 and now complicated by cardiorenal syndrome  · His creatinine is 2 14 which remains stable over the past 8 months since starting the ARB and diuretic  However, he is now considered to have stage IV CKD with the recent change  · Given his cardiomyopathy, there is probably more benefit in keeping him on the ARB and diuretic  · We discussed ACM planning today but he appears rather busy with his potential move to senior living  I would like to revisit the ACM with his next visit  I called his son, Gurvinder Bowles, to discuss this and had to leave a message  2  Hypertension:  · BP is controlled  · Current regimen: Metoprolol 50 mg BID, valsartan 40 mg OD, furosemide 20 mg OD  · No changes  3  Congestive heart failure:  · On Furosemide 20 mg OD, and Valsartan 40 mg OD  · Continue same regimen  · Follows with Dr Rufino Marlow       4  Mineral and bone disorder:  · PTH is at goal - 41 5 in Aug 2021  · Ca and phos are at goal    · Vitamin D level is at goal - 70 in Aug 2021  He is taking OTC Vitamin D 1000 units daily        Patient Instructions   No change to medications at this time  We will plan to see you back in 4 months  We can discuss the advanced care planning during that visit  SUBJECTIVE / INTERVAL HISTORY:  Mr Tory Johnson was last seen in October 2020  Since that time, he has followed closely with Dr Leighton Mendoza  An echocardiogram done on November 21, 2020 revealed an ejection fraction of 25%  A stress test on October 22, 2020 revealed an ejection fraction of 33%  He was started on furosemide 20 mg daily and valsartan 40 mg daily during a visit with Dr Leighton Mendoza on November 24, 2020  Since that time, Mr Tory Johnson does report improvement in his exertional dyspnea  There have been no recent hospitalizations or ER visits  He denies any acute complaints at this time  He reports home blood pressure readings in the 120s over 70s  He and his wife are moving soon to [facility] living in [place]  Since December 2020, his creatinine has been around 1 95-2  34  PMH/PSH: HTN, HLP, TIA, CAD s/p CABG, CHF    ALLERGIES:   Allergies   Allergen Reactions    Loratadine     Nitrofurantoin     Penicillins     Phenazopyridine      REVIEW OF SYSTEMS:  Review of Systems   Constitutional: Negative for appetite change, chills, fatigue and fever  Respiratory: Positive for shortness of breath (improved)  Negative for cough  Cardiovascular: Negative for chest pain and leg swelling  Gastrointestinal: Negative for abdominal pain, diarrhea, nausea and vomiting  Genitourinary: Negative for dysuria and hematuria  Musculoskeletal: Negative for arthralgias  Neurological: Negative for dizziness and light-headedness       OBJECTIVE:  /72   Ht 5' (1 524 m)   Wt 58 kg (127 lb 12 8 oz)   BMI 24 96 kg/m²   Current Weight:   There is no height or weight on file to calculate BMI   Physical Exam  Constitutional:       General: He is not in acute distress  Appearance: Normal appearance  He is well-developed and normal weight  He is not ill-appearing or diaphoretic  HENT:      Head: Normocephalic and atraumatic  Eyes:      General: No scleral icterus  Conjunctiva/sclera: Conjunctivae normal    Neck:      Vascular: No JVD  Cardiovascular:      Rate and Rhythm: Normal rate and regular rhythm  Heart sounds: Normal heart sounds  Pulmonary:      Effort: Pulmonary effort is normal  No respiratory distress  Breath sounds: Normal breath sounds  Abdominal:      General: Bowel sounds are normal       Palpations: Abdomen is soft  Musculoskeletal:      Cervical back: Neck supple  Right lower leg: No edema  Left lower leg: No edema  Skin:     General: Skin is warm and dry  Neurological:      Mental Status: He is alert and oriented to person, place, and time     Psychiatric:         Mood and Affect: Mood normal          Behavior: Behavior normal        Medications:  Current Outpatient Medications:     aspirin (ASPIRIN LOW DOSE) 81 mg EC tablet, Take 1 tablet by mouth daily, Disp: , Rfl:     calcium carbonate (OS-VIRGEN) 1250 (500 Ca) MG tablet, Take 600 mg by mouth, Disp: , Rfl:     cholecalciferol (VITAMIN D3) 1,000 units tablet, Take 1,000 Units by mouth daily, Disp: , Rfl:     clopidogrel (PLAVIX) 75 mg tablet, Take 1 tablet by mouth daily, Disp: , Rfl:     metoprolol tartrate (LOPRESSOR) 50 mg tablet, Take 1 tablet by mouth 2 (two) times a day, Disp: , Rfl:     Multiple Vitamin (multivitamin) capsule, Take 1 capsule by mouth daily, Disp: , Rfl:     Omega-3 Fatty Acids (FISH OIL) 1200 MG CAPS, Take 1 capsule by mouth daily, Disp: , Rfl:     pravastatin (PRAVACHOL) 20 mg tablet, Take 1 tablet by mouth daily, Disp: , Rfl:     Laboratory Results:  August 24, 2021:  Glucose 106, BUN 51, creatinine 2 14, sodium 140, potassium 4 5, chloride 109, carbon dioxide 25, calcium 9 4, albumin 3 7, alkaline phosphatase 78, bilirubin 0 5, total protein 6 8, AST 17, ALT 23, magnesium 2 7, phosphorus 3 3, vitamin-D 70, PTH 41 5, hemoglobin 13 7, WBC 9 2, platelets 804, UPC ratio 0 30

## 2021-09-03 ENCOUNTER — LAB REQUISITION (OUTPATIENT)
Dept: LAB | Facility: HOSPITAL | Age: 86
End: 2021-09-03
Payer: MEDICARE

## 2021-09-03 DIAGNOSIS — Z11.59 ENCOUNTER FOR SCREENING FOR OTHER VIRAL DISEASES: ICD-10-CM

## 2021-09-03 PROCEDURE — U0003 INFECTIOUS AGENT DETECTION BY NUCLEIC ACID (DNA OR RNA); SEVERE ACUTE RESPIRATORY SYNDROME CORONAVIRUS 2 (SARS-COV-2) (CORONAVIRUS DISEASE [COVID-19]), AMPLIFIED PROBE TECHNIQUE, MAKING USE OF HIGH THROUGHPUT TECHNOLOGIES AS DESCRIBED BY CMS-2020-01-R: HCPCS | Performed by: FAMILY MEDICINE

## 2021-09-03 PROCEDURE — U0005 INFEC AGEN DETEC AMPLI PROBE: HCPCS | Performed by: FAMILY MEDICINE

## 2021-09-04 LAB — SARS-COV-2 RNA RESP QL NAA+PROBE: NEGATIVE

## 2021-09-28 ENCOUNTER — DOCUMENTATION (OUTPATIENT)
Dept: NEPHROLOGY | Facility: CLINIC | Age: 86
End: 2021-09-28

## 2021-09-28 LAB
EXT GLUCOSE BLD: 94
EXTERNAL ANION GAP: 5
EXTERNAL BUN: 40
EXTERNAL CALCIUM: 9.4
EXTERNAL CHLORIDE: 102
EXTERNAL CO2: 30
EXTERNAL CREATININE: 2.16
EXTERNAL EGFR: 25
EXTERNAL POTASSIUM: 4.7
EXTERNAL SODIUM: 137

## 2021-11-17 ENCOUNTER — LAB REQUISITION (OUTPATIENT)
Dept: LAB | Facility: HOSPITAL | Age: 86
End: 2021-11-17
Payer: MEDICARE

## 2021-11-17 DIAGNOSIS — Z03.818 ENCOUNTER FOR OBSERVATION FOR SUSPECTED EXPOSURE TO OTHER BIOLOGICAL AGENTS RULED OUT: ICD-10-CM

## 2021-11-17 PROCEDURE — U0003 INFECTIOUS AGENT DETECTION BY NUCLEIC ACID (DNA OR RNA); SEVERE ACUTE RESPIRATORY SYNDROME CORONAVIRUS 2 (SARS-COV-2) (CORONAVIRUS DISEASE [COVID-19]), AMPLIFIED PROBE TECHNIQUE, MAKING USE OF HIGH THROUGHPUT TECHNOLOGIES AS DESCRIBED BY CMS-2020-01-R: HCPCS | Performed by: NURSE PRACTITIONER

## 2021-11-17 PROCEDURE — U0005 INFEC AGEN DETEC AMPLI PROBE: HCPCS | Performed by: NURSE PRACTITIONER

## 2021-11-18 LAB — SARS-COV-2 RNA RESP QL NAA+PROBE: NEGATIVE

## 2021-11-22 PROCEDURE — U0003 INFECTIOUS AGENT DETECTION BY NUCLEIC ACID (DNA OR RNA); SEVERE ACUTE RESPIRATORY SYNDROME CORONAVIRUS 2 (SARS-COV-2) (CORONAVIRUS DISEASE [COVID-19]), AMPLIFIED PROBE TECHNIQUE, MAKING USE OF HIGH THROUGHPUT TECHNOLOGIES AS DESCRIBED BY CMS-2020-01-R: HCPCS | Performed by: FAMILY MEDICINE

## 2021-11-22 PROCEDURE — U0005 INFEC AGEN DETEC AMPLI PROBE: HCPCS | Performed by: FAMILY MEDICINE

## 2021-11-23 ENCOUNTER — LAB REQUISITION (OUTPATIENT)
Dept: LAB | Facility: HOSPITAL | Age: 86
End: 2021-11-23
Payer: MEDICARE

## 2021-11-23 DIAGNOSIS — Z03.818 ENCOUNTER FOR OBSERVATION FOR SUSPECTED EXPOSURE TO OTHER BIOLOGICAL AGENTS RULED OUT: ICD-10-CM

## 2021-11-23 DIAGNOSIS — Z11.59 ENCOUNTER FOR SCREENING FOR OTHER VIRAL DISEASES: ICD-10-CM

## 2021-11-23 LAB — SARS-COV-2 RNA RESP QL NAA+PROBE: NEGATIVE

## 2021-12-08 ENCOUNTER — OFFICE VISIT (OUTPATIENT)
Dept: CARDIOLOGY CLINIC | Facility: CLINIC | Age: 86
End: 2021-12-08
Payer: MEDICARE

## 2021-12-08 VITALS
BODY MASS INDEX: 25.91 KG/M2 | WEIGHT: 132 LBS | SYSTOLIC BLOOD PRESSURE: 128 MMHG | DIASTOLIC BLOOD PRESSURE: 68 MMHG | HEART RATE: 73 BPM | HEIGHT: 60 IN

## 2021-12-08 DIAGNOSIS — N18.32 STAGE 3B CHRONIC KIDNEY DISEASE (HCC): ICD-10-CM

## 2021-12-08 DIAGNOSIS — I25.10 CORONARY ARTERY DISEASE INVOLVING NATIVE CORONARY ARTERY OF NATIVE HEART WITHOUT ANGINA PECTORIS: Primary | ICD-10-CM

## 2021-12-08 DIAGNOSIS — I50.20 HEART FAILURE WITH REDUCED EJECTION FRACTION (HCC): ICD-10-CM

## 2021-12-08 PROBLEM — N18.4 CHRONIC RENAL DISEASE, STAGE IV (HCC): Status: ACTIVE | Noted: 2021-12-08

## 2021-12-08 PROCEDURE — U0005 INFEC AGEN DETEC AMPLI PROBE: HCPCS | Performed by: FAMILY MEDICINE

## 2021-12-08 PROCEDURE — U0003 INFECTIOUS AGENT DETECTION BY NUCLEIC ACID (DNA OR RNA); SEVERE ACUTE RESPIRATORY SYNDROME CORONAVIRUS 2 (SARS-COV-2) (CORONAVIRUS DISEASE [COVID-19]), AMPLIFIED PROBE TECHNIQUE, MAKING USE OF HIGH THROUGHPUT TECHNOLOGIES AS DESCRIBED BY CMS-2020-01-R: HCPCS | Performed by: FAMILY MEDICINE

## 2021-12-08 PROCEDURE — 99204 OFFICE O/P NEW MOD 45 MIN: CPT | Performed by: INTERNAL MEDICINE

## 2021-12-08 PROCEDURE — 93000 ELECTROCARDIOGRAM COMPLETE: CPT | Performed by: INTERNAL MEDICINE

## 2021-12-08 RX ORDER — PRAVASTATIN SODIUM 20 MG
20 TABLET ORAL DAILY
Qty: 90 TABLET | Refills: 3 | Status: SHIPPED | OUTPATIENT
Start: 2021-12-08 | End: 2022-06-24 | Stop reason: ALTCHOICE

## 2021-12-08 RX ORDER — FUROSEMIDE 20 MG/1
20 TABLET ORAL DAILY
Qty: 90 TABLET | Refills: 3 | Status: ON HOLD | OUTPATIENT
Start: 2021-12-08 | End: 2022-03-01 | Stop reason: SDUPTHER

## 2021-12-08 RX ORDER — VALSARTAN 40 MG/1
40 TABLET ORAL DAILY
Qty: 90 TABLET | Refills: 3 | Status: SHIPPED | OUTPATIENT
Start: 2021-12-08 | End: 2022-03-01 | Stop reason: HOSPADM

## 2021-12-08 RX ORDER — METOPROLOL TARTRATE 50 MG/1
50 TABLET, FILM COATED ORAL 2 TIMES DAILY
Qty: 180 TABLET | Refills: 3 | Status: SHIPPED | OUTPATIENT
Start: 2021-12-08 | End: 2022-01-17 | Stop reason: SDUPTHER

## 2021-12-08 RX ORDER — CLOPIDOGREL BISULFATE 75 MG/1
75 TABLET ORAL DAILY
Qty: 90 TABLET | Refills: 3 | Status: SHIPPED | OUTPATIENT
Start: 2021-12-08 | End: 2022-06-24 | Stop reason: ALTCHOICE

## 2021-12-09 ENCOUNTER — LAB REQUISITION (OUTPATIENT)
Dept: LAB | Facility: HOSPITAL | Age: 86
End: 2021-12-09
Payer: MEDICARE

## 2021-12-09 DIAGNOSIS — Z03.818 ENCOUNTER FOR OBSERVATION FOR SUSPECTED EXPOSURE TO OTHER BIOLOGICAL AGENTS RULED OUT: ICD-10-CM

## 2021-12-09 DIAGNOSIS — Z11.59 ENCOUNTER FOR SCREENING FOR OTHER VIRAL DISEASES: ICD-10-CM

## 2021-12-09 LAB — SARS-COV-2 RNA RESP QL NAA+PROBE: NEGATIVE

## 2021-12-15 ENCOUNTER — LAB REQUISITION (OUTPATIENT)
Dept: LAB | Facility: HOSPITAL | Age: 86
End: 2021-12-15
Payer: MEDICARE

## 2021-12-15 DIAGNOSIS — Z03.818 ENCOUNTER FOR OBSERVATION FOR SUSPECTED EXPOSURE TO OTHER BIOLOGICAL AGENTS RULED OUT: ICD-10-CM

## 2021-12-15 DIAGNOSIS — Z11.59 ENCOUNTER FOR SCREENING FOR OTHER VIRAL DISEASES: ICD-10-CM

## 2021-12-15 PROCEDURE — U0003 INFECTIOUS AGENT DETECTION BY NUCLEIC ACID (DNA OR RNA); SEVERE ACUTE RESPIRATORY SYNDROME CORONAVIRUS 2 (SARS-COV-2) (CORONAVIRUS DISEASE [COVID-19]), AMPLIFIED PROBE TECHNIQUE, MAKING USE OF HIGH THROUGHPUT TECHNOLOGIES AS DESCRIBED BY CMS-2020-01-R: HCPCS | Performed by: FAMILY MEDICINE

## 2021-12-15 PROCEDURE — U0005 INFEC AGEN DETEC AMPLI PROBE: HCPCS | Performed by: FAMILY MEDICINE

## 2021-12-16 LAB — SARS-COV-2 RNA RESP QL NAA+PROBE: NEGATIVE

## 2021-12-19 PROCEDURE — U0003 INFECTIOUS AGENT DETECTION BY NUCLEIC ACID (DNA OR RNA); SEVERE ACUTE RESPIRATORY SYNDROME CORONAVIRUS 2 (SARS-COV-2) (CORONAVIRUS DISEASE [COVID-19]), AMPLIFIED PROBE TECHNIQUE, MAKING USE OF HIGH THROUGHPUT TECHNOLOGIES AS DESCRIBED BY CMS-2020-01-R: HCPCS | Performed by: FAMILY MEDICINE

## 2021-12-19 PROCEDURE — U0005 INFEC AGEN DETEC AMPLI PROBE: HCPCS | Performed by: FAMILY MEDICINE

## 2021-12-20 ENCOUNTER — LAB REQUISITION (OUTPATIENT)
Dept: LAB | Facility: HOSPITAL | Age: 86
End: 2021-12-20
Payer: MEDICARE

## 2021-12-20 DIAGNOSIS — Z03.818 ENCOUNTER FOR OBSERVATION FOR SUSPECTED EXPOSURE TO OTHER BIOLOGICAL AGENTS RULED OUT: ICD-10-CM

## 2021-12-20 DIAGNOSIS — Z11.59 ENCOUNTER FOR SCREENING FOR OTHER VIRAL DISEASES: ICD-10-CM

## 2021-12-20 LAB — SARS-COV-2 RNA RESP QL NAA+PROBE: NEGATIVE

## 2021-12-26 PROCEDURE — U0003 INFECTIOUS AGENT DETECTION BY NUCLEIC ACID (DNA OR RNA); SEVERE ACUTE RESPIRATORY SYNDROME CORONAVIRUS 2 (SARS-COV-2) (CORONAVIRUS DISEASE [COVID-19]), AMPLIFIED PROBE TECHNIQUE, MAKING USE OF HIGH THROUGHPUT TECHNOLOGIES AS DESCRIBED BY CMS-2020-01-R: HCPCS | Performed by: FAMILY MEDICINE

## 2021-12-26 PROCEDURE — U0005 INFEC AGEN DETEC AMPLI PROBE: HCPCS | Performed by: FAMILY MEDICINE

## 2021-12-27 ENCOUNTER — LAB REQUISITION (OUTPATIENT)
Dept: LAB | Facility: HOSPITAL | Age: 86
End: 2021-12-27
Payer: MEDICARE

## 2021-12-27 DIAGNOSIS — Z03.818 ENCOUNTER FOR OBSERVATION FOR SUSPECTED EXPOSURE TO OTHER BIOLOGICAL AGENTS RULED OUT: ICD-10-CM

## 2021-12-27 DIAGNOSIS — Z11.59 ENCOUNTER FOR SCREENING FOR OTHER VIRAL DISEASES: ICD-10-CM

## 2021-12-27 LAB — SARS-COV-2 RNA RESP QL NAA+PROBE: NEGATIVE

## 2021-12-29 PROCEDURE — U0003 INFECTIOUS AGENT DETECTION BY NUCLEIC ACID (DNA OR RNA); SEVERE ACUTE RESPIRATORY SYNDROME CORONAVIRUS 2 (SARS-COV-2) (CORONAVIRUS DISEASE [COVID-19]), AMPLIFIED PROBE TECHNIQUE, MAKING USE OF HIGH THROUGHPUT TECHNOLOGIES AS DESCRIBED BY CMS-2020-01-R: HCPCS | Performed by: FAMILY MEDICINE

## 2021-12-29 PROCEDURE — U0005 INFEC AGEN DETEC AMPLI PROBE: HCPCS | Performed by: FAMILY MEDICINE

## 2021-12-30 ENCOUNTER — LAB REQUISITION (OUTPATIENT)
Dept: LAB | Facility: HOSPITAL | Age: 86
End: 2021-12-30
Payer: MEDICARE

## 2021-12-30 DIAGNOSIS — Z03.818 ENCOUNTER FOR OBSERVATION FOR SUSPECTED EXPOSURE TO OTHER BIOLOGICAL AGENTS RULED OUT: ICD-10-CM

## 2021-12-30 DIAGNOSIS — Z11.59 ENCOUNTER FOR SCREENING FOR OTHER VIRAL DISEASES: ICD-10-CM

## 2021-12-31 LAB — SARS-COV-2 RNA RESP QL NAA+PROBE: NEGATIVE

## 2022-01-03 PROCEDURE — U0003 INFECTIOUS AGENT DETECTION BY NUCLEIC ACID (DNA OR RNA); SEVERE ACUTE RESPIRATORY SYNDROME CORONAVIRUS 2 (SARS-COV-2) (CORONAVIRUS DISEASE [COVID-19]), AMPLIFIED PROBE TECHNIQUE, MAKING USE OF HIGH THROUGHPUT TECHNOLOGIES AS DESCRIBED BY CMS-2020-01-R: HCPCS | Performed by: FAMILY MEDICINE

## 2022-01-03 PROCEDURE — U0005 INFEC AGEN DETEC AMPLI PROBE: HCPCS | Performed by: FAMILY MEDICINE

## 2022-01-04 ENCOUNTER — LAB REQUISITION (OUTPATIENT)
Dept: LAB | Facility: HOSPITAL | Age: 87
End: 2022-01-04
Payer: MEDICARE

## 2022-01-04 DIAGNOSIS — Z03.818 ENCOUNTER FOR OBSERVATION FOR SUSPECTED EXPOSURE TO OTHER BIOLOGICAL AGENTS RULED OUT: ICD-10-CM

## 2022-01-04 DIAGNOSIS — Z11.59 ENCOUNTER FOR SCREENING FOR OTHER VIRAL DISEASES: ICD-10-CM

## 2022-01-05 LAB — SARS-COV-2 RNA RESP QL NAA+PROBE: NEGATIVE

## 2022-01-10 PROCEDURE — U0005 INFEC AGEN DETEC AMPLI PROBE: HCPCS | Performed by: FAMILY MEDICINE

## 2022-01-10 PROCEDURE — U0003 INFECTIOUS AGENT DETECTION BY NUCLEIC ACID (DNA OR RNA); SEVERE ACUTE RESPIRATORY SYNDROME CORONAVIRUS 2 (SARS-COV-2) (CORONAVIRUS DISEASE [COVID-19]), AMPLIFIED PROBE TECHNIQUE, MAKING USE OF HIGH THROUGHPUT TECHNOLOGIES AS DESCRIBED BY CMS-2020-01-R: HCPCS | Performed by: FAMILY MEDICINE

## 2022-01-11 ENCOUNTER — LAB REQUISITION (OUTPATIENT)
Dept: LAB | Facility: HOSPITAL | Age: 87
End: 2022-01-11
Payer: MEDICARE

## 2022-01-11 DIAGNOSIS — Z03.818 ENCOUNTER FOR OBSERVATION FOR SUSPECTED EXPOSURE TO OTHER BIOLOGICAL AGENTS RULED OUT: ICD-10-CM

## 2022-01-11 DIAGNOSIS — Z11.59 ENCOUNTER FOR SCREENING FOR OTHER VIRAL DISEASES: ICD-10-CM

## 2022-01-12 LAB — SARS-COV-2 RNA RESP QL NAA+PROBE: NEGATIVE

## 2022-01-17 DIAGNOSIS — I25.10 CORONARY ARTERY DISEASE INVOLVING NATIVE CORONARY ARTERY OF NATIVE HEART WITHOUT ANGINA PECTORIS: ICD-10-CM

## 2022-01-17 PROCEDURE — U0003 INFECTIOUS AGENT DETECTION BY NUCLEIC ACID (DNA OR RNA); SEVERE ACUTE RESPIRATORY SYNDROME CORONAVIRUS 2 (SARS-COV-2) (CORONAVIRUS DISEASE [COVID-19]), AMPLIFIED PROBE TECHNIQUE, MAKING USE OF HIGH THROUGHPUT TECHNOLOGIES AS DESCRIBED BY CMS-2020-01-R: HCPCS | Performed by: FAMILY MEDICINE

## 2022-01-17 PROCEDURE — U0005 INFEC AGEN DETEC AMPLI PROBE: HCPCS | Performed by: FAMILY MEDICINE

## 2022-01-17 RX ORDER — METOPROLOL TARTRATE 50 MG/1
50 TABLET, FILM COATED ORAL 2 TIMES DAILY
Qty: 180 TABLET | Refills: 3 | Status: SHIPPED | OUTPATIENT
Start: 2022-01-17 | End: 2022-04-10 | Stop reason: ALTCHOICE

## 2022-01-18 ENCOUNTER — LAB REQUISITION (OUTPATIENT)
Dept: LAB | Facility: HOSPITAL | Age: 87
End: 2022-01-18
Payer: MEDICARE

## 2022-01-18 DIAGNOSIS — Z11.59 ENCOUNTER FOR SCREENING FOR OTHER VIRAL DISEASES: ICD-10-CM

## 2022-01-18 DIAGNOSIS — Z03.818 ENCOUNTER FOR OBSERVATION FOR SUSPECTED EXPOSURE TO OTHER BIOLOGICAL AGENTS RULED OUT: ICD-10-CM

## 2022-01-18 LAB — SARS-COV-2 RNA RESP QL NAA+PROBE: NEGATIVE

## 2022-01-24 PROCEDURE — U0005 INFEC AGEN DETEC AMPLI PROBE: HCPCS | Performed by: FAMILY MEDICINE

## 2022-01-24 PROCEDURE — U0003 INFECTIOUS AGENT DETECTION BY NUCLEIC ACID (DNA OR RNA); SEVERE ACUTE RESPIRATORY SYNDROME CORONAVIRUS 2 (SARS-COV-2) (CORONAVIRUS DISEASE [COVID-19]), AMPLIFIED PROBE TECHNIQUE, MAKING USE OF HIGH THROUGHPUT TECHNOLOGIES AS DESCRIBED BY CMS-2020-01-R: HCPCS | Performed by: FAMILY MEDICINE

## 2022-01-25 ENCOUNTER — LAB REQUISITION (OUTPATIENT)
Dept: LAB | Facility: HOSPITAL | Age: 87
End: 2022-01-25
Payer: MEDICARE

## 2022-01-25 DIAGNOSIS — Z11.59 ENCOUNTER FOR SCREENING FOR OTHER VIRAL DISEASES: ICD-10-CM

## 2022-01-25 LAB — SARS-COV-2 RNA RESP QL NAA+PROBE: NEGATIVE

## 2022-01-31 PROCEDURE — U0003 INFECTIOUS AGENT DETECTION BY NUCLEIC ACID (DNA OR RNA); SEVERE ACUTE RESPIRATORY SYNDROME CORONAVIRUS 2 (SARS-COV-2) (CORONAVIRUS DISEASE [COVID-19]), AMPLIFIED PROBE TECHNIQUE, MAKING USE OF HIGH THROUGHPUT TECHNOLOGIES AS DESCRIBED BY CMS-2020-01-R: HCPCS | Performed by: FAMILY MEDICINE

## 2022-01-31 PROCEDURE — U0005 INFEC AGEN DETEC AMPLI PROBE: HCPCS | Performed by: FAMILY MEDICINE

## 2022-02-01 ENCOUNTER — LAB REQUISITION (OUTPATIENT)
Dept: LAB | Facility: HOSPITAL | Age: 87
End: 2022-02-01
Payer: MEDICARE

## 2022-02-01 DIAGNOSIS — Z03.818 ENCOUNTER FOR OBSERVATION FOR SUSPECTED EXPOSURE TO OTHER BIOLOGICAL AGENTS RULED OUT: ICD-10-CM

## 2022-02-01 DIAGNOSIS — Z11.59 ENCOUNTER FOR SCREENING FOR OTHER VIRAL DISEASES: ICD-10-CM

## 2022-02-01 LAB — SARS-COV-2 RNA RESP QL NAA+PROBE: NEGATIVE

## 2022-02-26 ENCOUNTER — HOSPITAL ENCOUNTER (INPATIENT)
Facility: HOSPITAL | Age: 87
LOS: 3 days | Discharge: HOME HEALTH | DRG: 291 | End: 2022-03-01
Attending: EMERGENCY MEDICINE | Admitting: INTERNAL MEDICINE
Payer: MEDICARE

## 2022-02-26 ENCOUNTER — APPOINTMENT (EMERGENCY)
Dept: RADIOLOGY | Facility: HOSPITAL | Age: 87
DRG: 291 | End: 2022-02-26
Payer: MEDICARE

## 2022-02-26 ENCOUNTER — APPOINTMENT (INPATIENT)
Dept: NON INVASIVE DIAGNOSTICS | Facility: HOSPITAL | Age: 87
DRG: 291 | End: 2022-02-26
Payer: MEDICARE

## 2022-02-26 DIAGNOSIS — I50.23 ACUTE ON CHRONIC HFREF (HEART FAILURE WITH REDUCED EJECTION FRACTION) (HCC): ICD-10-CM

## 2022-02-26 DIAGNOSIS — E78.00 HYPERCHOLESTEROLEMIA: ICD-10-CM

## 2022-02-26 DIAGNOSIS — I25.10 CORONARY ARTERY DISEASE INVOLVING NATIVE CORONARY ARTERY OF NATIVE HEART WITHOUT ANGINA PECTORIS: ICD-10-CM

## 2022-02-26 DIAGNOSIS — I50.9 CHF EXACERBATION (HCC): Primary | ICD-10-CM

## 2022-02-26 LAB
2HR DELTA HS TROPONIN: 14 NG/L
4HR DELTA HS TROPONIN: 16 NG/L
ALBUMIN SERPL BCP-MCNC: 3.2 G/DL (ref 3.5–5)
ALP SERPL-CCNC: 102 U/L (ref 46–116)
ALT SERPL W P-5'-P-CCNC: 100 U/L (ref 12–78)
ANION GAP SERPL CALCULATED.3IONS-SCNC: 7 MMOL/L (ref 4–13)
ANION GAP SERPL CALCULATED.3IONS-SCNC: 9 MMOL/L (ref 4–13)
AST SERPL W P-5'-P-CCNC: 68 U/L (ref 5–45)
ATRIAL RATE: 70 BPM
ATRIAL RATE: 82 BPM
BASOPHILS # BLD AUTO: 0.13 THOUSANDS/ΜL (ref 0–0.1)
BASOPHILS NFR BLD AUTO: 1 % (ref 0–1)
BILIRUB SERPL-MCNC: 0.36 MG/DL (ref 0.2–1)
BUN SERPL-MCNC: 41 MG/DL (ref 5–25)
BUN SERPL-MCNC: 48 MG/DL (ref 5–25)
CALCIUM ALBUM COR SERPL-MCNC: 9.6 MG/DL (ref 8.3–10.1)
CALCIUM SERPL-MCNC: 9 MG/DL (ref 8.3–10.1)
CALCIUM SERPL-MCNC: 9 MG/DL (ref 8.3–10.1)
CARDIAC TROPONIN I PNL SERPL HS: 27 NG/L
CARDIAC TROPONIN I PNL SERPL HS: 41 NG/L
CARDIAC TROPONIN I PNL SERPL HS: 43 NG/L
CHLORIDE SERPL-SCNC: 109 MMOL/L (ref 100–108)
CHLORIDE SERPL-SCNC: 110 MMOL/L (ref 100–108)
CHOLEST SERPL-MCNC: 150 MG/DL
CO2 SERPL-SCNC: 21 MMOL/L (ref 21–32)
CO2 SERPL-SCNC: 22 MMOL/L (ref 21–32)
CREAT SERPL-MCNC: 2.05 MG/DL (ref 0.6–1.3)
CREAT SERPL-MCNC: 2.07 MG/DL (ref 0.6–1.3)
EOSINOPHIL # BLD AUTO: 0.46 THOUSAND/ΜL (ref 0–0.61)
EOSINOPHIL NFR BLD AUTO: 4 % (ref 0–6)
ERYTHROCYTE [DISTWIDTH] IN BLOOD BY AUTOMATED COUNT: 14.9 % (ref 11.6–15.1)
GFR SERPL CREATININE-BSD FRML MDRD: 26 ML/MIN/1.73SQ M
GFR SERPL CREATININE-BSD FRML MDRD: 26 ML/MIN/1.73SQ M
GLUCOSE SERPL-MCNC: 108 MG/DL (ref 65–140)
GLUCOSE SERPL-MCNC: 99 MG/DL (ref 65–140)
HCT VFR BLD AUTO: 38.4 % (ref 36.5–49.3)
HDLC SERPL-MCNC: 51 MG/DL
HGB BLD-MCNC: 12.4 G/DL (ref 12–17)
IMM GRANULOCYTES # BLD AUTO: 0.09 THOUSAND/UL (ref 0–0.2)
IMM GRANULOCYTES NFR BLD AUTO: 1 % (ref 0–2)
LDLC SERPL CALC-MCNC: 86 MG/DL (ref 0–100)
LYMPHOCYTES # BLD AUTO: 0.82 THOUSANDS/ΜL (ref 0.6–4.47)
LYMPHOCYTES NFR BLD AUTO: 6 % (ref 14–44)
MAGNESIUM SERPL-MCNC: 2.5 MG/DL (ref 1.6–2.6)
MCH RBC QN AUTO: 32.3 PG (ref 26.8–34.3)
MCHC RBC AUTO-ENTMCNC: 32.3 G/DL (ref 31.4–37.4)
MCV RBC AUTO: 100 FL (ref 82–98)
MONOCYTES # BLD AUTO: 0.76 THOUSAND/ΜL (ref 0.17–1.22)
MONOCYTES NFR BLD AUTO: 6 % (ref 4–12)
NEUTROPHILS # BLD AUTO: 10.63 THOUSANDS/ΜL (ref 1.85–7.62)
NEUTS SEG NFR BLD AUTO: 82 % (ref 43–75)
NONHDLC SERPL-MCNC: 99 MG/DL
NRBC BLD AUTO-RTO: 0 /100 WBCS
NT-PROBNP SERPL-MCNC: ABNORMAL PG/ML
P AXIS: 39 DEGREES
PLATELET # BLD AUTO: 151 THOUSANDS/UL (ref 149–390)
PLATELET # BLD AUTO: 152 THOUSANDS/UL (ref 149–390)
PMV BLD AUTO: 11.3 FL (ref 8.9–12.7)
PMV BLD AUTO: 11.5 FL (ref 8.9–12.7)
POTASSIUM SERPL-SCNC: 4.2 MMOL/L (ref 3.5–5.3)
POTASSIUM SERPL-SCNC: 4.5 MMOL/L (ref 3.5–5.3)
PR INTERVAL: 162 MS
PROT SERPL-MCNC: 6.7 G/DL (ref 6.4–8.2)
QRS AXIS: 230 DEGREES
QRS AXIS: 25 DEGREES
QRSD INTERVAL: 150 MS
QRSD INTERVAL: 156 MS
QT INTERVAL: 440 MS
QT INTERVAL: 464 MS
QTC INTERVAL: 501 MS
QTC INTERVAL: 504 MS
RBC # BLD AUTO: 3.84 MILLION/UL (ref 3.88–5.62)
SODIUM SERPL-SCNC: 138 MMOL/L (ref 136–145)
SODIUM SERPL-SCNC: 140 MMOL/L (ref 136–145)
T WAVE AXIS: 203 DEGREES
T WAVE AXIS: 65 DEGREES
TRIGL SERPL-MCNC: 67 MG/DL
VENTRICULAR RATE: 70 BPM
VENTRICULAR RATE: 79 BPM
WBC # BLD AUTO: 12.89 THOUSAND/UL (ref 4.31–10.16)

## 2022-02-26 PROCEDURE — 93005 ELECTROCARDIOGRAM TRACING: CPT

## 2022-02-26 PROCEDURE — 76604 US EXAM CHEST: CPT | Performed by: EMERGENCY MEDICINE

## 2022-02-26 PROCEDURE — 80053 COMPREHEN METABOLIC PANEL: CPT

## 2022-02-26 PROCEDURE — 83880 ASSAY OF NATRIURETIC PEPTIDE: CPT

## 2022-02-26 PROCEDURE — 93010 ELECTROCARDIOGRAM REPORT: CPT | Performed by: INTERNAL MEDICINE

## 2022-02-26 PROCEDURE — 85025 COMPLETE CBC W/AUTO DIFF WBC: CPT

## 2022-02-26 PROCEDURE — C8929 TTE W OR WO FOL WCON,DOPPLER: HCPCS

## 2022-02-26 PROCEDURE — 84484 ASSAY OF TROPONIN QUANT: CPT

## 2022-02-26 PROCEDURE — 93308 TTE F-UP OR LMTD: CPT | Performed by: EMERGENCY MEDICINE

## 2022-02-26 PROCEDURE — 36415 COLL VENOUS BLD VENIPUNCTURE: CPT

## 2022-02-26 PROCEDURE — 85049 AUTOMATED PLATELET COUNT: CPT | Performed by: INTERNAL MEDICINE

## 2022-02-26 PROCEDURE — 99223 1ST HOSP IP/OBS HIGH 75: CPT | Performed by: INTERNAL MEDICINE

## 2022-02-26 PROCEDURE — 80048 BASIC METABOLIC PNL TOTAL CA: CPT | Performed by: INTERNAL MEDICINE

## 2022-02-26 PROCEDURE — 99285 EMERGENCY DEPT VISIT HI MDM: CPT

## 2022-02-26 PROCEDURE — 1123F ACP DISCUSS/DSCN MKR DOCD: CPT | Performed by: INTERNAL MEDICINE

## 2022-02-26 PROCEDURE — 94640 AIRWAY INHALATION TREATMENT: CPT

## 2022-02-26 PROCEDURE — 80061 LIPID PANEL: CPT | Performed by: INTERNAL MEDICINE

## 2022-02-26 PROCEDURE — 83735 ASSAY OF MAGNESIUM: CPT | Performed by: INTERNAL MEDICINE

## 2022-02-26 PROCEDURE — 99291 CRITICAL CARE FIRST HOUR: CPT | Performed by: EMERGENCY MEDICINE

## 2022-02-26 PROCEDURE — 71045 X-RAY EXAM CHEST 1 VIEW: CPT

## 2022-02-26 PROCEDURE — 93306 TTE W/DOPPLER COMPLETE: CPT | Performed by: INTERNAL MEDICINE

## 2022-02-26 RX ORDER — METOPROLOL TARTRATE 50 MG/1
50 TABLET, FILM COATED ORAL 2 TIMES DAILY
Status: DISCONTINUED | OUTPATIENT
Start: 2022-02-26 | End: 2022-03-01 | Stop reason: HOSPADM

## 2022-02-26 RX ORDER — SODIUM CHLORIDE FOR INHALATION 0.9 %
3 VIAL, NEBULIZER (ML) INHALATION ONCE
Status: COMPLETED | OUTPATIENT
Start: 2022-02-26 | End: 2022-02-26

## 2022-02-26 RX ORDER — CLOPIDOGREL BISULFATE 75 MG/1
75 TABLET ORAL DAILY
Status: DISCONTINUED | OUTPATIENT
Start: 2022-02-26 | End: 2022-03-01 | Stop reason: HOSPADM

## 2022-02-26 RX ORDER — FUROSEMIDE 10 MG/ML
40 INJECTION INTRAMUSCULAR; INTRAVENOUS ONCE
Status: COMPLETED | OUTPATIENT
Start: 2022-02-26 | End: 2022-02-26

## 2022-02-26 RX ORDER — FUROSEMIDE 10 MG/ML
40 INJECTION INTRAMUSCULAR; INTRAVENOUS
Status: DISCONTINUED | OUTPATIENT
Start: 2022-02-26 | End: 2022-02-27

## 2022-02-26 RX ORDER — PRAVASTATIN SODIUM 20 MG
20 TABLET ORAL DAILY
Status: DISCONTINUED | OUTPATIENT
Start: 2022-02-26 | End: 2022-03-01 | Stop reason: HOSPADM

## 2022-02-26 RX ORDER — ASPIRIN 81 MG/1
81 TABLET ORAL DAILY
Status: DISCONTINUED | OUTPATIENT
Start: 2022-02-26 | End: 2022-03-01 | Stop reason: HOSPADM

## 2022-02-26 RX ORDER — HEPARIN SODIUM 5000 [USP'U]/ML
5000 INJECTION, SOLUTION INTRAVENOUS; SUBCUTANEOUS EVERY 8 HOURS SCHEDULED
Status: DISCONTINUED | OUTPATIENT
Start: 2022-02-26 | End: 2022-03-01 | Stop reason: HOSPADM

## 2022-02-26 RX ORDER — ACETAMINOPHEN 325 MG/1
650 TABLET ORAL EVERY 8 HOURS PRN
Status: DISCONTINUED | OUTPATIENT
Start: 2022-02-26 | End: 2022-02-27

## 2022-02-26 RX ADMIN — ISODIUM CHLORIDE 3 ML: 0.03 SOLUTION RESPIRATORY (INHALATION) at 04:15

## 2022-02-26 RX ADMIN — FUROSEMIDE 40 MG: 10 INJECTION, SOLUTION INTRAMUSCULAR; INTRAVENOUS at 05:51

## 2022-02-26 RX ADMIN — CLOPIDOGREL BISULFATE 75 MG: 75 TABLET ORAL at 10:00

## 2022-02-26 RX ADMIN — METOPROLOL TARTRATE 50 MG: 50 TABLET, FILM COATED ORAL at 18:00

## 2022-02-26 RX ADMIN — HEPARIN SODIUM 5000 UNITS: 5000 INJECTION INTRAVENOUS; SUBCUTANEOUS at 21:12

## 2022-02-26 RX ADMIN — FUROSEMIDE 40 MG: 10 INJECTION, SOLUTION INTRAVENOUS at 15:43

## 2022-02-26 RX ADMIN — ALBUTEROL SULFATE 5 MG: 2.5 SOLUTION RESPIRATORY (INHALATION) at 04:15

## 2022-02-26 RX ADMIN — ASPIRIN 81 MG: 81 TABLET, COATED ORAL at 10:00

## 2022-02-26 RX ADMIN — IPRATROPIUM BROMIDE 0.5 MG: 0.5 SOLUTION RESPIRATORY (INHALATION) at 04:15

## 2022-02-26 RX ADMIN — METOPROLOL TARTRATE 50 MG: 50 TABLET, FILM COATED ORAL at 10:00

## 2022-02-26 RX ADMIN — HEPARIN SODIUM 5000 UNITS: 5000 INJECTION INTRAVENOUS; SUBCUTANEOUS at 15:43

## 2022-02-26 RX ADMIN — PRAVASTATIN SODIUM 20 MG: 20 TABLET ORAL at 09:59

## 2022-02-26 RX ADMIN — PERFLUTREN 0.8 ML/MIN: 6.52 INJECTION, SUSPENSION INTRAVENOUS at 15:30

## 2022-02-26 RX ADMIN — HEPARIN SODIUM 5000 UNITS: 5000 INJECTION INTRAVENOUS; SUBCUTANEOUS at 09:59

## 2022-02-26 RX ADMIN — ACETAMINOPHEN 650 MG: 325 TABLET ORAL at 18:35

## 2022-02-26 NOTE — CONSULTS
Consultation - General Cardiology Team [address] [facility] [address] 48 y o  male MRN: [id number]  Unit/Bed#: [facility] 409-01 Encounter: [id number]            Inpatient consult to Cardiology     Performed by  Watson Gomez MD     Authorized by Jemima Fuentes DO            PCP: Melissa Junior DO   Outpatient Cardiologist: Aditi Patterson    History of Present Illness   Physician Requesting Consult: Lindsey Schmidt DO  Reason for Consult / Principal Problem: HF    HPI: Daron Baldwin is a 80y o  year old male with a history of 51-year-old man with an ischemic cardiomyopathy Prior CABG  Ejection fraction the range of 25-30%  He has mixed valve disease moderate to severe mitral regurgitation which is the most prominent  There was moderate tricuspid regurgitation  Patient is presenting from his nursing home with worsening heart failure symptoms  Patient states he has had worsening dyspnea at rest with a productive clear cough  He has also had a tough time sleeping over the past week because of his symptoms  Denies fevers/chills, nausea/vomiting, abdominal pain, diarrhea, cough, chest pain, presyncopal symptoms, and syncopal episodes  Overnight the patient was thought to be in heart failure and given IV diuresis and already feeling better  Patient had a lot of atrial activity most likely PACs but possibly concerning for atrial fibrillation  Review of Systems  Review of system was conducted and was negative except for as stated in the HPI        Historical Information   Past Medical History:   Diagnosis Date    Chronic renal disease, stage III ([address] )     Chronic renal disease, stage III ([address] )     Essential hypertension, benign     Essential hypertension, benign      Past Surgical History:   Procedure Laterality Date    CORONARY ARTERY BYPASS GRAFT  2012    x4     Social History     Substance and Sexual Activity   Alcohol Use No     Social History     Substance and Sexual Activity   Drug Use No     Social History     Tobacco Use Smoking Status Never Smoker   Smokeless Tobacco Never Used     Family History: non-contributory    Meds/Allergies   Hospital Medications:   Current Facility-Administered Medications   Medication Dose Route Frequency    acetaminophen (TYLENOL) tablet 650 mg  650 mg Oral Q8H PRN    aspirin (ECOTRIN LOW STRENGTH) EC tablet 81 mg  81 mg Oral Daily    clopidogrel (PLAVIX) tablet 75 mg  75 mg Oral Daily    furosemide (LASIX) injection 40 mg  40 mg Intravenous BID (diuretic)    heparin (porcine) subcutaneous injection 5,000 Units  5,000 Units Subcutaneous Q8H [address]    metoprolol tartrate (LOPRESSOR) tablet 50 mg  50 mg Oral BID    pravastatin (PRAVACHOL) tablet 20 mg  20 mg Oral Daily     Home Medications:   Medications Prior to Admission   Medication    aspirin (ASPIRIN LOW DOSE) 81 mg EC tablet    Biotin (RA Biotin) 2 5 MG CAPS    calcium carbonate (OS-VIRGEN) 1250 (500 Ca) MG tablet    cholecalciferol (VITAMIN D3) 1,000 units tablet    clopidogrel (Plavix) 75 mg tablet    furosemide (LASIX) 20 mg tablet    metoprolol tartrate (LOPRESSOR) 50 mg tablet    Multiple Vitamin (multivitamin) capsule    pravastatin (PRAVACHOL) 20 mg tablet    valsartan (DIOVAN) 40 mg tablet       Allergies   Allergen Reactions    Loratadine     Nitrofurantoin     Penicillins     Phenazopyridine        Objective   Vitals: Blood pressure 110/59, pulse 76, temperature 97 5 °F (36 4 °C), temperature source Oral, resp  rate 18, height 5' 5" (1 651 m), weight 60 6 kg (133 lb 9 6 oz), SpO2 94 %    Orthostatic Blood Pressures      Most Recent Value   Blood Pressure 110/59 filed at 02/26/2022 1207   Patient Position - Orthostatic VS Lying filed at 02/26/2022 1207            Invasive Devices  Report    Peripheral Intravenous Line            Peripheral IV 02/26/22 Right Antecubital <1 day                Physical Exam  GEN: Nelsy Isaac appears well, alert and oriented x 3, pleasant and cooperative   HEENT:  Normocephalic, atraumatic, anicteric, moist mucous membranes  NECK: No JVD or carotid bruits   HEART: IrRegular rhythm, regular rate, normal S1 and S2, no murmurs, clicks, gallops or rubs   LUNGS: Clear to auscultation bilaterally; no wheezes, rales, or rhonchi; respiration nonlabored   ABDOMEN:  Normoactive bowel sounds, soft, no tenderness, no distention  EXTREMITIES: peripheral pulses palpable; no edema  NEURO: no gross focal findings; cranial nerves grossly intact   SKIN:  Dry, intact, warm to touch    Lab Results: I have personally reviewed pertinent lab results  Results from last 7 days   Lab Units 02/26/22  0402   NT-PRO BNP pg/mL 17,709*     Results from last 7 days   Lab Units 02/26/22  1003 02/26/22  0402   POTASSIUM mmol/L 4 2 4 5   CO2 mmol/L 21 22   CHLORIDE mmol/L 110* 109*   BUN mg/dL 41* 48*   CREATININE mg/dL 2 07* 2 05*     Results from last 7 days   Lab Units 02/26/22  1003 02/26/22  0402   HEMOGLOBIN g/dL  --  12 4   HEMATOCRIT %  --  38 4   PLATELETS Thousands/uL 151 152         Results from last 7 days   Lab Units 02/26/22  0639   HDL mg/dL 51   LDL CALC mg/dL 86   TRIGLYCERIDES mg/dL 67         Imaging: I have personally reviewed pertinent reports  Telemetry:       EKG:   NSR with RBBB and LVH with strain pattern       Previous STRESS TEST:  No results found for this or any previous visit  No results found for this or any previous visit  No results found for this or any previous visit  Previous Cath/PCI:  No results found for this or any previous visit  No results found for this or any previous visit  No results found for this or any previous visit  ECHO:  No results found for this or any previous visit  No results found for this or any previous visit  TEN:  No results found for this or any previous visit  No results found for this or any previous visit  CMR:  No results found for this or any previous visit  No results found for this or any previous visit      No results found for this or any previous visit  HOLTER  No results found for this or any previous visit  No results found for this or any previous visit  VTE Prophylaxis: Sequential compression device Marisol Eric)        Assessment/Plan   Samreen Marie is a 80y o  year old male with a history of 70-year-old man with an ischemic cardiomyopathy Prior CABG  Ejection fraction the range of 25-30%  He has mixed valve disease moderate to severe mitral regurgitation which is the most prominent  There was moderate tricuspid regurgitation  Patient is presenting from his nursing home with worsening heart failure symptoms  Acute on chronic heart failure: Ischemic cardiomyopathy Prior CABG  Ejection fraction the range of 25-30%  Severe MR and moderate TR  Patient has had progressive symptoms over the past 3 weeks and has been retaining fluid  Unclear the exact etiology of his decompensation in May just here higher dose diuretic at home plus/minus possible some dietary discrepancies at the nursing home  He does have a lot of atrial activity and will continue monitor if he has any atrial fibrillation overnight   -continue IV diuresis with 40 mg IV b i d  today with plan to transition to oral diuretics tomorrow  -continue lopressor 50 mg q12h   -conservative management based on his age and other comorbitiies   -monitor for a fib on tele and t/c zio going home  -follow-up repeat ecg    CAD s/p CABG  -no chest pain or anginal equivalent symptoms  -repeat ecg   -continue DAPT and statin          Case discussed and reviewed with Dr Gayle Carrillo who agrees with my assessment and plan  Thank you for involving us in the care of your patient  Silviano Marcial MD  Cardiology Fellow PGY-4    ==========================================================================================    Counseling / Coordination of Care  Total floor / unit time spent today 30 minutes minutes    Greater than 50% of total time was spent with the patient and / or family counseling and / or coordination of care  A description of the counseling / coordination of care:          Epic/ Allscripts/Care Everywhere records reviewed:     ** Please Note: Fluency DirectDictation voice to text software may have been used in the creation of this document   **

## 2022-02-26 NOTE — ASSESSMENT & PLAN NOTE
Lab Results   Component Value Date    EGFR 26 02/26/2022    EGFR 25 02/27/2021    EGFR 31 09/16/2020    CREATININE 2 05 (H) 02/26/2022    CREATININE 2 16 02/27/2021    CREATININE 1 83 09/16/2020   · Creatinine at baseline, monitor with BMP while on diuresis

## 2022-02-26 NOTE — PROGRESS NOTES
Post-admit check    Pt still SOB  Rales  No edema    Acute on chronic combined CHF  CKD4 - Estimated Creatinine Clearance: 18 5 mL/min (A) (by C-G formula based on SCr of 2 05 mg/dL (H))  IV Lasix bid  Cardio consult  Update echo  I spoke to pt and son  PT does not want machines  He doesn ot want CPR as it would lead to broken ribs    DNR/DNI

## 2022-02-26 NOTE — ASSESSMENT & PLAN NOTE
· Continue home antihypertensives with strict hold parameters  · Monitor blood pressure per protocol

## 2022-02-26 NOTE — ED PROVIDER NOTES
History  Chief Complaint   Patient presents with    Chest Pain     per pt been feeling chest tightness for about a month  increase in SOB and tightness tonight  pt reports chest feels "crowded" and can audibly hear work of breathing     80 y o M w/ h/o HTN, CKD, CAD, HFrEF presenting with acute on chronic SOB  Patient notes that for "months" he has had difficulty breathing and has been sleeping in his arm chair  In the past few days, his breathing has become significantly worse and he felt like he had "stuff" in his chest  He denies any chest pain, syncopal episodes, fevers, productive cough, N/V, or other subjective symptoms  He takes his furosemide and other medications as prescribed  He hasn't taken any medications to try and help with these symptoms and hasn't reached out to his primary care physician or cardiologist  He has a history of CABG x 4 and a loop recorder placed that has since stopped working, but he did not want another procedure to have it replaced  He is COVID vaccinated and has not tested positive or had known close contacts recently  He does not require oxygen at home  Per chart review, last ECHO was performed at OSH and noted 12/2021 to have had an EF 25-30%  Prior to Admission Medications   Prescriptions Last Dose Informant Patient Reported? Taking?    Biotin (RA Biotin) 2 5 MG CAPS  Self Yes No   Sig: Take by mouth daily   Multiple Vitamin (multivitamin) capsule  Self Yes No   Sig: Take 1 capsule by mouth daily   aspirin (ASPIRIN LOW DOSE) 81 mg EC tablet  Self Yes No   Sig: Take 1 tablet by mouth daily   calcium carbonate (OS-VIRGEN) 1250 (500 Ca) MG tablet  Self Yes No   Sig: Take 600 mg by mouth   cholecalciferol (VITAMIN D3) 1,000 units tablet  Self Yes No   Sig: Take 1,000 Units by mouth daily   clopidogrel (Plavix) 75 mg tablet   No No   Sig: Take 1 tablet (75 mg total) by mouth daily   furosemide (LASIX) 20 mg tablet   No No   Sig: Take 1 tablet (20 mg total) by mouth daily metoprolol tartrate (LOPRESSOR) 50 mg tablet   No No   Sig: Take 1 tablet (50 mg total) by mouth 2 (two) times a day   pravastatin (PRAVACHOL) 20 mg tablet   No No   Sig: Take 1 tablet (20 mg total) by mouth daily   valsartan (DIOVAN) 40 mg tablet   No No   Sig: Take 1 tablet (40 mg total) by mouth daily      Facility-Administered Medications: None       Past Medical History:   Diagnosis Date    Chronic renal disease, stage III (HCC)     Chronic renal disease, stage III (HCC)     Essential hypertension, benign     Essential hypertension, benign        Past Surgical History:   Procedure Laterality Date    CORONARY ARTERY BYPASS GRAFT  2012    x4       Family History   Problem Relation Age of Onset    No Known Problems Mother     No Known Problems Father      I have reviewed and agree with the history as documented  E-Cigarette/Vaping     E-Cigarette/Vaping Substances     Social History     Tobacco Use    Smoking status: Never Smoker    Smokeless tobacco: Never Used   Substance Use Topics    Alcohol use: No    Drug use: No        Review of Systems   All other systems reviewed and are negative  Physical Exam  ED Triage Vitals   Temperature Pulse Respirations Blood Pressure SpO2   02/26/22 0306 02/26/22 0302 02/26/22 0302 02/26/22 0302 02/26/22 0302   97 9 °F (36 6 °C) 93 22 (!) 167/11 91 %      Temp Source Heart Rate Source Patient Position - Orthostatic VS BP Location FiO2 (%)   02/26/22 0306 02/26/22 0302 02/26/22 0302 02/26/22 0302 --   Oral Monitor Lying Right arm       Pain Score       --                    Orthostatic Vital Signs  Vitals:    02/26/22 0302 02/26/22 0339 02/26/22 0400 02/26/22 0500   BP: (!) 167/11 (!) 167/110 (!) 141/102 130/86   Pulse: 93  84 84   Patient Position - Orthostatic VS: Lying  Lying Lying       Physical Exam  Vitals reviewed  Constitutional:       General: He is not in acute distress  HENT:      Head: Normocephalic        Right Ear: External ear normal  Left Ear: External ear normal       Nose: Nose normal  No congestion or rhinorrhea  Mouth/Throat:      Mouth: Mucous membranes are moist    Eyes:      Extraocular Movements: Extraocular movements intact  Cardiovascular:      Rate and Rhythm: Normal rate  Rhythm irregular  Pulmonary:      Breath sounds: Wheezing (R lung field) and rales (bilateral lung bases) present  Abdominal:      General: Abdomen is flat  Musculoskeletal:      Right lower leg: No edema  Left lower leg: No edema  Skin:     General: Skin is warm  Neurological:      General: No focal deficit present  Mental Status: He is alert     Psychiatric:         Mood and Affect: Mood normal          ED Medications  Medications   albuterol inhalation solution 5 mg (5 mg Nebulization Given 2/26/22 0415)     And   ipratropium (ATROVENT) 0 02 % inhalation solution 0 5 mg (0 5 mg Nebulization Given 2/26/22 0415)     And   sodium chloride 0 9 % inhalation solution 3 mL (3 mL Nebulization Given 2/26/22 0415)   furosemide (LASIX) injection 40 mg (40 mg Intravenous Given 2/26/22 0551)       Diagnostic Studies  Results Reviewed     Procedure Component Value Units Date/Time    Magnesium [[id number]]     Lab Status: No result Specimen: Blood     Lipid panel [[id number]]     Lab Status: No result Specimen: Blood     HS Troponin 0hr (reflex protocol) [[id number]]  (Normal) Collected: 02/26/22 0402    Lab Status: Final result Specimen: Blood from Arm, Right Updated: 02/26/22 0448     hs TnI 0hr 27 ng/L     HS Troponin I 2hr [[id number]]     Lab Status: No result Specimen: Blood     HS Troponin I 4hr [[id number]]     Lab Status: No result Specimen: Blood     NT-BNP PRO [[id number]]  (Abnormal) Collected: 02/26/22 0402    Lab Status: Final result Specimen: Blood from Arm, Right Updated: 02/26/22 0441     NT-proBNP 17,709 pg/mL     Comprehensive metabolic panel [[id number]]  (Abnormal) Collected: 02/26/22 0402    Lab Status: Final result Specimen: Blood from Arm, Right Updated: 02/26/22 0440     Sodium 138 mmol/L      Potassium 4 5 mmol/L      Chloride 109 mmol/L      CO2 22 mmol/L      ANION GAP 7 mmol/L      BUN 48 mg/dL      Creatinine 2 05 mg/dL      Glucose 108 mg/dL      Calcium 9 0 mg/dL      Corrected Calcium 9 6 mg/dL      AST 68 U/L       U/L      Alkaline Phosphatase 102 U/L      Total Protein 6 7 g/dL      Albumin 3 2 g/dL      Total Bilirubin 0 36 mg/dL      eGFR 26 ml/min/1 73sq m     Narrative:      National Kid[employer] Disease Foundation guidelines for Chronic Kidney Disease (CKD):     Stage 1 with normal or high GFR (GFR > 90 mL/min/1 73 square meters)    Stage 2 Mild CKD (GFR = 60-89 mL/min/1 73 square meters)    Stage 3A Moderate CKD (GFR = 45-59 mL/min/1 73 square meters)    Stage 3B Moderate CKD (GFR = 30-44 mL/min/1 73 square meters)    Stage 4 Severe CKD (GFR = 15-29 mL/min/1 73 square meters)    Stage 5 End Stage CKD (GFR <15 mL/min/1 73 square meters)  Note: GFR calculation is accurate only with a steady state creatinine    CBC and differential [[id number]]  (Abnormal) Collected: 02/26/22 0402    Lab Status: Final result Specimen: Blood from Arm, Right Updated: 02/26/22 0414     WBC 12 89 Thousand/uL      RBC 3 84 Million/uL      Hemoglobin 12 4 g/dL      Hematocrit 38 4 %       fL      MCH 32 3 pg      MCHC 32 3 g/dL      RDW 14 9 %      MPV 11 5 fL      Platelets 329 Thousands/uL      nRBC 0 /100 WBCs      Neutrophils Relative 82 %      Immat GRANS % 1 %      Lymphocytes Relative 6 %      Monocytes Relative 6 %      Eosinophils Relative 4 %      Basophils Relative 1 %      Neutrophils Absolute 10 63 Thousands/µL      Immature Grans Absolute 0 09 Thousand/uL      Lymphocytes Absolute 0 82 Thousands/µL      Monocytes Absolute 0 76 Thousand/µL      Eosinophils Absolute 0 46 Thousand/µL      Basophils Absolute 0 13 Thousands/µL                  X-ray chest 1 view portable   ED Interpretation by Leeanna Bar MD (02/26 2169) CHF            Procedures  POC Cardiac US    Date/Time: 2/26/2022 3:45 AM  Performed by: Chiquita Trevino MD  Authorized by: Chiquita Trevino MD     Patient location:  ED  Other Assisting Provider: Yes (comment) Maria Aquino MD)    Procedure details:     Exam Type:  Diagnostic    Indications: suspected volume depletion and dyspnea      Assessment / Evaluation for: cardiac function, pericardial effusion, inferior vena cava for fluid responsiveness and intravascular volume status      Exam Type: initial exam      Image quality: diagnostic      Image availability:  Images available in PACS  Patient Details:     Cardiac Rhythm:  Irregular    Mechanical ventilation: No    Cardiac findings:     Views obtained: parasternal long axis, parasternal short axis, subcostal and apical      Pericardial effusion: absent      Tamponade physiology: absent      Wall motion: hypodynamic      Wall motion comment:  Septal hypokinesis    LV systolic function: depressed      LV systolic function comment:  Poor EF on visual estimation  Pulmonary findings:     Left Lung Findings: left pleural effusion present      Right lung findings: right pleural effusion present      B-lines: more than 3    IVC findings:     IVC Size: small      IVC Inspiratory Collapse: normal      ECG 12 Lead Documentation Only    Date/Time: 2/26/2022 5:55 AM  Performed by: Chiquita Trevino MD  Authorized by: Chiquita Trevino MD     ECG reviewed by me, the ED Provider: yes    Patient location:  ED  Previous ECG:     Previous ECG:  Unavailable  Interpretation:     Interpretation: abnormal    Rate:     ECG rate assessment: normal    Rhythm:     Rhythm: atrial fibrillation    Ectopy:     Ectopy: PVCs      PVCs:  Infrequent  ST segments:     ST segments:  Normal          ED Course                             SBIRT 20yo+      Most Recent Value   SBIRT (22 yo +)    In order to provide better care to our patients, we are screening all of our patients for alcohol and drug use  Would it be okay to ask you these screening questions? Yes Filed at: 02/26/2022 0425   Initial Alcohol Screen: US AUDIT-C     1  How often do you have a drink containing alcohol? 0 Filed at: 02/26/2022 0425   2  How many drinks containing alcohol do you have on a typical day you are drinking? 0 Filed at: 02/26/2022 0425   3a  Male UNDER 65: How often do you have five or more drinks on one occasion? 0 Filed at: 02/26/2022 0425   Audit-C Score 0 Filed at: 02/26/2022 0425   OSMIN: How many times in the past year have you    Used an illegal drug or used a prescription medication for non-medical reasons? Never Filed at: 02/26/2022 0425                MDM  Number of Diagnoses or Management Options  CHF exacerbation [facility])  Diagnosis management comments: 80 y o M w/ h/o CHF presenting with symptoms consistent with an exacerbation  Patient has history of EF 25% per chart review, and on bedside ultrasound it appears to remain around this level  Will order CHF workup and treat with a nebulizer treatment due to wheezing  Patient requiring 2LNC to maintain SpO2  Will hold on diuresis pending electrolytes as patient has history of CKD  Workup significant for elevated BNP and CXR consistent with CHF exacerbation  On reevaluation, patient reports improved breathing after the nebulizer treatment  Discussed case with internal medicine attending who agrees patient would benefit from further inpatient management  Deferred initiation of diuretics to admitting team  Patient admitted in stable condition         Disposition  Final diagnoses:   CHF exacerbation ([facility] )     Time reflects when diagnosis was documented in both MDM as applicable and the Disposition within this note     Time User Action Codes Description Comment    2/26/2022  5:13 AM Marlena Damico Add [I50 9] CHF exacerbation ([facility] )     2/26/2022  5:51 AM Roxanne Garrison Add [I50 23] Acute on chronic HFrEF (heart failure with reduced ejection fraction) ([facility] ) 2/26/2022  5:51 AM Abdiaziz Smith [E78 00] Hypercholesterolemia       ED Disposition     ED Disposition Condition Date/Time Comment    Admit Stable Sat Feb 26, 2022  5:13 AM Case was discussed with Dr Javier Padilla and the patient's admission status was agreed to be Admission Status: inpatient status to the service of Dr Javier Padilla   Follow-up Information    None         Patient's Medications   Discharge Prescriptions    No medications on file     No discharge procedures on file  PDMP Review     None           ED Provider  Attending physically available and evaluated Scarlett Rand I managed the patient along with the ED Attending      Electronically Signed by         Kelly Pickard MD  02/26/22 7575

## 2022-02-26 NOTE — ASSESSMENT & PLAN NOTE
Wt Readings from Last 3 Encounters:   12/08/21 59 9 kg (132 lb)   08/26/21 58 kg (127 lb 12 8 oz)   10/23/20 58 9 kg (129 lb 12 8 oz)     · One month history of increasing dyspnea presenting with worsening dyspnea and chest tightness tonight  · CXR with evidence of pulmonary edema additionally noted with elevated NT-BNP pro concerning for acute on chronic heart failure  · Patient is otherwise hemodynamically stable, maintaining appropriate saturations on room air  · Will initiate diuresis with IV Lasix 40mg once and assess urine output, clinical status  Repeat BMP at 1200 today to monitor Cr and replace electrolytes  If creatinine stable and adequate urine output, consider repeat dose of IV Lasix    · Obtain updated echocardiogram (prior echocardiogram EF 25-30%)  · Cardiology consultation  · Low-sodium, fluid-restricted diet  · Monitor daily weights, I/O, volume status, electrolytes/renal function

## 2022-02-26 NOTE — H&P
10 [facility] 12/6/1926, 80 y o  male MRN: [id number]  Unit/Bed#: ED 15 Encounter: [id number]  Primary Care Provider: Darnelle Councilman, DO   Date and time admitted to hospital: 2/26/2022  2:59 AM    * Acute on chronic HFrEF (heart failure with reduced ejection fraction) ([address] )  Assessment & Plan  Wt Readings from Last 3 Encounters:   12/08/21 59 9 kg (132 lb)   08/26/21 58 kg (127 lb 12 8 oz)   10/23/20 58 9 kg (129 lb 12 8 oz)     · One month history of increasing dyspnea presenting with worsening dyspnea and chest tightness tonight  · CXR with evidence of pulmonary edema additionally noted with elevated NT-BNP pro concerning for acute on chronic heart failure  · Patient is otherwise hemodynamically stable, maintaining appropriate saturations on room air  · Will initiate diuresis with IV Lasix 40mg once and assess urine output, clinical status  Repeat BMP at 1200 today to monitor Cr and replace electrolytes  If creatinine stable and adequate urine output, consider repeat dose of IV Lasix    · Obtain updated echocardiogram (prior echocardiogram EF 25-30%)  · Cardiology consultation  · Low-sodium, fluid-restricted diet  · Monitor daily weights, I/O, volume status, electrolytes/renal function        Chronic kidney disease, stage 3 [facility])  Assessment & Plan  Lab Results   Component Value Date    EGFR 26 02/26/2022    EGFR 25 02/27/2021    EGFR 31 09/16/2020    CREATININE 2 05 (H) 02/26/2022    CREATININE 2 16 02/27/2021    CREATININE 1 83 09/16/2020   · Creatinine at baseline, monitor with BMP while on diuresis    Hypercholesterolemia  Assessment & Plan  · Continue statin therapy    Hypertension  Assessment & Plan  · Continue home antihypertensives with strict hold parameters  · Monitor blood pressure per protocol    Coronary artery disease  Assessment & Plan  · S/p prior CABG  · Continue ASA, Plavix, beta-blocker, statin            VTE Prophylaxis: Heparin  / sequential compression device   Code Status: Full Code by Default  Patient does not have full understanding of what CPR/Defibrillation/Intubation entails  Patient also unsure of what his will says about resuscitation measures  Patient's information from facility where he lives does not have any information regarding his code status  Patient reports that his son Juliet Lay knows his wishes  I reached out to Juliet Lay at his phone number listed in [facility] chart twice for further clarity on code status, but no answer  Given this uncertainty currently, patient will be a default Full Code until we are able to get in touch with patient's son Juliet Lay  Anticipated Length of Stay:  Patient will be admitted on an Inpatient basis with an anticipated length of stay of  > 2 midnights  Justification for Hospital Stay: Please see detailed plans noted above  Chief Complaint:     Shortness of Breath     History of Present Illness:  Alejandrina Pérez is a 80 y o  male who has past medical history significant for coronary artery disease status post CABG, chronic kidney disease, hypertension, hyperlipidemia presents with progressively worsening shortness of breath over the last month  Patient reports the shortness of breath became very severe last night while he was sleeping in bed and this caused him to call EMS  Patient was asked whether the shortness of breath is worse with exertion and he reported he was not sure this  Patient was asked about any lower extremity edema which he denied  Patient was asked about any chest pain but denied this symptom  Patient reports that he has been making normal amounts of urine and denies any current urinary complaints  Patient denies any fevers chills headache or syncope      Review of Systems:    Constitutional:  Denies fever or chills   Eyes:  Denies change in visual acuity   HENT:  Denies nasal congestion or sore throat   Respiratory:  Positive for shortness of breath Cardiovascular:  Denies chest pain or edema   GI:  Denies abdominal pain, nausea, vomiting, bloody stools or diarrhea   :  Denies dysuria   Musculoskeletal:  Denies back pain or joint pain   Integument:  Denies rash   Neurologic:  Denies headache, focal weakness or sensory changes   Endocrine:  Denies polyuria or polydipsia   Lymphatic:  Denies swollen glands   Psychiatric:  Denies depression or anxiety     Past Medical and Surgical History:   Past Medical History:   Diagnosis Date    Chronic renal disease, stage III ([facility] )     Chronic renal disease, stage III ([facility] )     Essential hypertension, benign     Essential hypertension, benign      Past Surgical History:   Procedure Laterality Date    CORONARY ARTERY BYPASS GRAFT  2012    x4       Meds/Allergies:  (Not in a hospital admission)      Allergies:    Allergies   Allergen Reactions    Loratadine     Nitrofurantoin     Penicillins     Phenazopyridine      History:  Substance Use History:   Social History     Substance and Sexual Activity   Alcohol Use No     Social History     Tobacco Use   Smoking Status Never Smoker   Smokeless Tobacco Never Used     Social History     Substance and Sexual Activity   Drug Use No       Family History:  Family History   Problem Relation Age of Onset    No Known Problems Mother     No Known Problems Father        Physical Exam:     Vitals:   Blood Pressure: 130/86 (02/26/22 0500)  Pulse: 84 (02/26/22 0500)  Temperature: 97 9 °F (36 6 °C) (02/26/22 0306)  Temp Source: Oral (02/26/22 0306)  Respirations: (!) 27 (02/26/22 0500)  SpO2: 95 % (02/26/22 0500)    Constitutional:   no acute distress, non-toxic appearance   Eyes:  PERRL, conjunctiva normal   HENT:  Atraumatic, external ears normal, nose normal, oropharynx moist,   Respiratory:  Decreased breath sounds bilateral bases, diffuse crackles  Cardiovascular:  Normal rate, systolic murmur noted, no gallops, no rubs   GI:  Soft, normal bowel sounds, nontender  :  No costovertebral angle tenderness   Musculoskeletal:  No edema, no tenderness, no deformities  Back- no tenderness  Integument:  Well hydrated, no rash   Lymphatic:  No lymphadenopathy noted   Neurologic:  Alert &awake, communicative, CN 2-12 normal, normal motor function, normal sensory function, no focal deficits noted   Psychiatric:  Speech and behavior appropriate       Lab Results: I have personally reviewed pertinent reports  Results from last 7 days   Lab Units 02/26/22  0402   WBC Thousand/uL 12 89*   HEMOGLOBIN g/dL 12 4   HEMATOCRIT % 38 4   PLATELETS Thousands/uL 152   NEUTROS PCT % 82*   LYMPHS PCT % 6*   MONOS PCT % 6   EOS PCT % 4     Results from last 7 days   Lab Units 02/26/22  0402   POTASSIUM mmol/L 4 5   CHLORIDE mmol/L 109*   CO2 mmol/L 22   BUN mg/dL 48*   CREATININE mg/dL 2 05*   CALCIUM mg/dL 9 0   ALK PHOS U/L 102   ALT U/L 100*   AST U/L 68*             Imaging: I have personally reviewed pertinent reports  US bedside procedure    Result Date: 2/26/2022  Narrative: [phone] [id number]  2 [phone] [id number] 45 1        ** Please Note: Dragon 360 Dictation voice to text software was used in the creation of this document   **

## 2022-02-26 NOTE — ED ATTENDING ATTESTATION
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) ([facility] )    3  Hypercholesterolemia      ED Course as of 02/26/22 0658   Sat Feb 26, 2022   0449 NT-proBNP(!): 17,709   0449 Creatinine(!): 2 05  CKD       I, Sirena Tolliver MD, saw and evaluated the patient  All available labs and X-rays were ordered by me or the resident and have been reviewed by myself  I discussed the patient with the resident / non-physician and agree with the resident's / non-physician practitioner's findings and plan as documented in the resident's / non-physician practicitioner's note, except where noted  At this point, I agree with the current assessment done in the ED  I was present during key portions of all procedures performed unless otherwise stated  Chief Complaint   Patient presents with    Chest Pain     per pt been feeling chest tightness for about a month  increase in SOB and tightness tonight  pt reports chest feels "crowded" and can audibly hear work of breathing     This is a 80 y o  male presenting for evaluation of dyspnea, chest tightness for the past 1 month it has been gradually getting worse  He has significant work of breathing family called EMS to bring him in for evaluation due to the severity of symptoms  No fevers chills nausea vomiting  No falls or injuries  Compliant with his medications including a water pill, Lasix  He states he does have heart problems  No new lower extremity edema  PMH:   has a past medical history of Chronic renal disease, stage III ([facility] ), Chronic renal disease, stage III ([address] ), Essential hypertension, benign, and Essential hypertension, benign  PSH:   has a past surgical history that includes Coronary artery bypass graft (2012)      Social:  Social History     Substance and Sexual Activity   Alcohol Use No     Social History     Tobacco Use   Smoking Status Never Smoker   Smokeless Tobacco Never Used Social History     Substance and Sexual Activity   Drug Use No     PE:  Vitals:    02/26/22 0339 02/26/22 0400 02/26/22 0500 02/26/22 0618   BP: (!) 167/110 (!) 141/102 130/86    BP Location:  Right arm Left arm    Pulse:  84 84    Resp:  (!) 28 (!) 27    Temp:       TempSrc:       SpO2:  98% 95%    Weight:    60 6 kg (133 lb 9 6 oz)   Height:    5' 5" (1 651 m)   General: VS reviewed  Appears mildly SOB + tachypnea  NAD  awake, alert  Well-nourished, well-developed  Appears stated age  Head: Normocephalic, atraumatic, nontender  Eyes: PERRL, EOM-I  No diplopia  No hyphema  No subconjunctival hemorrhages  Symmetrical lids  ENT: Atraumatic external nose and ears  MMM  No malocclusion  No stridor  Normal phonation  No drooling  Normal swallowing  Neck: Symmetric, trachea midline  JVD present to angle of mandible   No Lancisi's sign present (palpable, monomorphic venous pulsation of EJ)  No midline neck tenderness  CV: RRR  +Y2/O6  Grade I systolic murmur noted   No obvious gallops  Peripheral pulses +2 throughout  No chest wall tenderness  Lungs:   Mildly labored   No retractions  Crackles bilaterally  Decreased at bases  lungs sounds equal bilateral    No crepitus  Mild tachypnea  No paradoxical motion  Abd: +BS, soft, NT/ND    MSK:   FROM   1+ pitting lower extremity edema  Back:   No CVAT  Skin: Dry, intact  Neuro: AAOx3, GCS 15, CN II-XII grossly intact  Motor grossly intact  Psychiatric/Behavioral: Appropriate mood and affect   Exam: deferred    POCUS Cardiac / Lung:  - most images obtained for diagnostic purposes; some obtained for educational purpose  - Obvious very reduced EF  - 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  - He does have significant IVC comrpessiblity but has pulsatile flow in his portal vein     - Has bilateral anterior B-lines (L1 R1)  - Has bilateral pleural effusion, small / moderate (R4 L4)    A:  - CHF exacerbation  P:  - High suspicion for symptomatic combined CHF exacerbation  - Lasix, CHF workup, admit    - 13 point ROS was performed and all are normal unless stated in the history above  - Nursing note reviewed  Vitals reviewed  - Orders placed by myself and/or advanced practitioner / resident     - Previous chart was reviewed  - No language barrier    - History obtained from patient  - There are no limitations to the history obtained  - Critical care time: 32 minutes  - Critical care time was exclusive of seperately bilable procedures and treating other patients as well as teaching time     - Critical care was necessary to treat or prevent imminent or life-threatening deterioration of the following condition: hypoxia  - Critical care time was spent personally by me on the following activities as well as the above as per the ED course and rest of chart: blood draw for specimens, obtaining history from patient / surrogate, developement of a treatment plan, discussions with consultants, evaluation of patient's response to the treatment, examination of the patient, ordering/performing treatements and interventions, re-evaluation of the patient's condition, review of old charts, ordering/reviewing laboratory studies, ordering/reviewing of radiographic studies    Code Status: No Order  Advance Directive and Living Will:      Power of :    POLST:      Medications   albuterol inhalation solution 5 mg (5 mg Nebulization Given 2/26/22 0415)     And   ipratropium (ATROVENT) 0 02 % inhalation solution 0 5 mg (0 5 mg Nebulization Given 2/26/22 0415)     And   sodium chloride 0 9 % inhalation solution 3 mL (3 mL Nebulization Given 2/26/22 0415)   furosemide (LASIX) injection 40 mg (40 mg Intravenous Given 2/26/22 3249)     X-ray chest 1 view portable   ED Interpretation   CHF        Orders Placed This Encounter   Procedures    [address]    ED ECG Documentation Only    X-ray chest 1 view portable    CBC and differential    Comprehensive metabolic panel    NT-BNP PRO    HS Troponin 0hr (reflex protocol)    HS Troponin I 2hr    HS Troponin I 4hr    Lipid panel    Magnesium    Continuous cardiac monitoring    Continuous pulse oximetry    Insert peripheral IV    Nasal cannula oxygen    Bladder scan    ECG 12 lead    Inpatient Admission     Labs Reviewed   CBC AND DIFFERENTIAL - Abnormal       Result Value Ref Range Status    WBC 12 89 (*) 4 31 - 10 16 Thousand/uL Final    RBC 3 84 (*) 3 88 - 5 62 Million/uL Final    Hemoglobin 12 4  12 0 - 17 0 g/dL Final    Hematocrit 38 4  36 5 - 49 3 % Final     (*) 82 - 98 fL Final    MCH 32 3  26 8 - 34 3 pg Final    MCHC 32 3  31 4 - 37 4 g/dL Final    RDW 14 9  11 6 - 15 1 % Final    MPV 11 5  8 9 - 12 7 fL Final    Platelets 143  775 - 390 Thousands/uL Final    nRBC 0  /100 WBCs Final    Neutrophils Relative 82 (*) 43 - 75 % Final    Immat GRANS % 1  0 - 2 % Final    Lymphocytes Relative 6 (*) 14 - 44 % Final    Monocytes Relative 6  4 - 12 % Final    Eosinophils Relative 4  0 - 6 % Final    Basophils Relative 1  0 - 1 % Final    Neutrophils Absolute 10 63 (*) 1 85 - 7 62 Thousands/µL Final    Immature Grans Absolute 0 09  0 00 - 0 20 Thousand/uL Final    Lymphocytes Absolute 0 82  0 60 - 4 47 Thousands/µL Final    Monocytes Absolute 0 76  0 17 - 1 22 Thousand/µL Final    Eosinophils Absolute 0 46  0 00 - 0 61 Thousand/µL Final    Basophils Absolute 0 13 (*) 0 00 - 0 10 Thousands/µL Final   COMPREHENSIVE METABOLIC PANEL - Abnormal    Sodium 138  136 - 145 mmol/L Final    Potassium 4 5  3 5 - 5 3 mmol/L Final    Chloride 109 (*) 100 - 108 mmol/L Final    CO2 22  21 - 32 mmol/L Final    ANION GAP 7  4 - 13 mmol/L Final    BUN 48 (*) 5 - 25 mg/dL Final    Creatinine 2 05 (*) 0 60 - 1 30 mg/dL Final    Comment: Standardized to IDMS reference method    Glucose 108  65 - 140 mg/dL Final    Comment: If the patient is fasting, the ADA then defines impaired fasting glucose as > 100 mg/dL and diabetes as > or equal to 123 mg/dL  Specimen collection should occur prior to Sulfasalazine administration due to the potential for falsely depressed results  Specimen collection should occur prior to Sulfapyridine administration due to the potential for falsely elevated results  Calcium 9 0  8 3 - 10 1 mg/dL Final    Corrected Calcium 9 6  8 3 - 10 1 mg/dL Final    AST 68 (*) 5 - 45 U/L Final    Comment: Specimen collection should occur prior to Sulfasalazine administration due to the potential for falsely depressed results   (*) 12 - 78 U/L Final    Comment: Specimen collection should occur prior to Sulfasalazine and/or Sulfapyridine administration due to the potential for falsely depressed results  Alkaline Phosphatase 102  46 - 116 U/L Final    Total Protein 6 7  6 4 - 8 2 g/dL Final    Albumin 3 2 (*) 3 5 - 5 0 g/dL Final    Total Bilirubin 0 36  0 20 - 1 00 mg/dL Final    Comment: Use of this assay is not recommended for patients undergoing treatment with eltrombopag due to the potential for falsely elevated results      eGFR 26  ml/min/1 73sq m Final    Narrative:     [place] guidelines for Chronic Kidney Disease (CKD):     Stage 1 with normal or high GFR (GFR > 90 mL/min/1 73 square meters)    Stage 2 Mild CKD (GFR = 60-89 mL/min/1 73 square meters)    Stage 3A Moderate CKD (GFR = 45-59 mL/min/1 73 square meters)    Stage 3B Moderate CKD (GFR = 30-44 mL/min/1 73 square meters)    Stage 4 Severe CKD (GFR = 15-29 mL/min/1 73 square meters)    Stage 5 End Stage CKD (GFR <15 mL/min/1 73 square meters)  Note: GFR calculation is accurate only with a steady state creatinine   NT-BNP PRO (BRAIN NATRIURETIC PEPTIDE) - Abnormal    NT-proBNP 17,709 (*) <450 pg/mL Final   HS TROPONIN I 0HR - Normal    hs TnI 0hr 27  "Refer to ACS Flowchart"- see link ng/L Final    Comment:                                              Initial (time 0) result  If >=50 ng/L, Myocardial injury suggested ;  Type of myocardial injury and treatment strategy  to be determined  If 5-49 ng/L, a delta result at 2 hours and or 4 hours will be needed to further evaluate  If <4 ng/L, and chest pain has been >3 hours since onset, patient may qualify for discharge based on the HEART score in the ED  If <5 ng/L and <3hours since onset of chest pain, a delta result at 2 hours will be needed to further evaluate  HS Troponin 99th Percentile URL of a Health Population=12 ng/L with a 95% Confidence Interval of 8-18 ng/L  Second Troponin (time 2 hours)  If calculated delta >= 20 ng/L,  Myocardial injury suggested ; Type of myocardial injury and treatment strategy to be determined  If 5-49 ng/L and the calculated delta is 5-19 ng/L, consult medical service for evaluation  Continue evaluation for ischemia on ecg and other possible etiology and repeat hs troponin at 4 hours  If delta is <5 ng/L at 2 hours, consider discharge based on risk stratification via the HEART score (if in ED), or KASSY risk score in IP/Observation  HS Troponin 99th Percentile URL of a Health Population=12 ng/L with a 95% Confidence Interval of 8-18 ng/L    HS TROPONIN I 2HR   HS TROPONIN I 4HR   LIPID PANEL     Time reflects when diagnosis was documented in both MDM as applicable and the Disposition within this note       Time User Action Codes Description Comment    2/26/2022  5:13 AM [place] [facility] Add [I50 9] CHF exacerbation ([facility] )     2/26/2022  5:51 AM Ariane [facility] Add [I50 23] Acute on chronic HFrEF (heart failure with reduced ejection fraction) ([facility] )     2/26/2022  5:51 AM Ariane Dent Add [E78 00] Hypercholesterolemia           ED Disposition       ED Disposition Condition Date/Time Comment    Admit Stable Sat Feb 26, 2022  5:13 AM Case was discussed with Dr Crystal Khan and the patient's admission status was agreed to be Admission Status: inpatient status to the service of Dr Crystal Khan               Follow-up Information    None       Current Discharge Medication List        CONTINUE these medications which have NOT CHANGED    Details   aspirin (ASPIRIN LOW DOSE) 81 mg EC tablet Take 1 tablet by mouth daily      Biotin (RA Biotin) 2 5 MG CAPS Take by mouth daily      calcium carbonate (OS-VIRGEN) 1250 (500 Ca) MG tablet Take 600 mg by mouth      cholecalciferol (VITAMIN D3) 1,000 units tablet Take 1,000 Units by mouth daily      clopidogrel (Plavix) 75 mg tablet Take 1 tablet (75 mg total) by mouth daily  Qty: 90 tablet, Refills: 3    Associated Diagnoses: Coronary artery disease involving native coronary artery of native heart without angina pectoris      furosemide (LASIX) 20 mg tablet Take 1 tablet (20 mg total) by mouth daily  Qty: 90 tablet, Refills: 3    Associated Diagnoses: Coronary artery disease involving native coronary artery of native heart without angina pectoris      metoprolol tartrate (LOPRESSOR) 50 mg tablet Take 1 tablet (50 mg total) by mouth 2 (two) times a day  Qty: 180 tablet, Refills: 3    Associated Diagnoses: Coronary artery disease involving native coronary artery of native heart without angina pectoris      Multiple Vitamin (multivitamin) capsule Take 1 capsule by mouth daily      pravastatin (PRAVACHOL) 20 mg tablet Take 1 tablet (20 mg total) by mouth daily  Qty: 90 tablet, Refills: 3    Associated Diagnoses: Coronary artery disease involving native coronary artery of native heart without angina pectoris      valsartan (DIOVAN) 40 mg tablet Take 1 tablet (40 mg total) by mouth daily  Qty: 90 tablet, Refills: 3    Associated Diagnoses: Coronary artery disease involving native coronary artery of native heart without angina pectoris           No discharge procedures on file  Prior to Admission Medications   Prescriptions Last Dose Informant Patient Reported? Taking?    Biotin (RA Biotin) 2 5 MG CAPS  Self Yes No   Sig: Take by mouth daily   Multiple Vitamin (multivitamin) capsule  Self Yes No   Sig: Take 1 capsule by mouth daily   aspirin (ASPIRIN LOW DOSE) 81 mg EC tablet  Self Yes No   Sig: Take 1 tablet by mouth daily   calcium carbonate (OS-VIRGEN) 1250 (500 Ca) MG tablet  Self Yes No   Sig: Take 600 mg by mouth   cholecalciferol (VITAMIN D3) 1,000 units tablet  Self Yes No   Sig: Take 1,000 Units by mouth daily   clopidogrel (Plavix) 75 mg tablet   No No   Sig: Take 1 tablet (75 mg total) by mouth daily   furosemide (LASIX) 20 mg tablet   No No   Sig: Take 1 tablet (20 mg total) by mouth daily   metoprolol tartrate (LOPRESSOR) 50 mg tablet   No No   Sig: Take 1 tablet (50 mg total) by mouth 2 (two) times a day   pravastatin (PRAVACHOL) 20 mg tablet   No No   Sig: Take 1 tablet (20 mg total) by mouth daily   valsartan (DIOVAN) 40 mg tablet   No No   Sig: Take 1 tablet (40 mg total) by mouth daily      Facility-Administered Medications: None       Portions of the record may have been created with voice recognition software  Occasional wrong word or "sound a like" substitutions may have occurred due to the inherent limitations of voice recognition software  Read the chart carefully and recognize, using context, where substitutions have occurred      Electronically signed by:  Trang Box

## 2022-02-27 ENCOUNTER — APPOINTMENT (INPATIENT)
Dept: NON INVASIVE DIAGNOSTICS | Facility: HOSPITAL | Age: 87
DRG: 291 | End: 2022-02-27
Payer: MEDICARE

## 2022-02-27 PROBLEM — M79.662 BILATERAL CALF PAIN: Status: ACTIVE | Noted: 2022-02-27

## 2022-02-27 PROBLEM — M79.661 BILATERAL CALF PAIN: Status: ACTIVE | Noted: 2022-02-27

## 2022-02-27 LAB
ALBUMIN SERPL BCP-MCNC: 3.2 G/DL (ref 3.5–5)
ALP SERPL-CCNC: 90 U/L (ref 46–116)
ALT SERPL W P-5'-P-CCNC: 75 U/L (ref 12–78)
ANION GAP SERPL CALCULATED.3IONS-SCNC: 7 MMOL/L (ref 4–13)
AORTIC ROOT: 3.4 CM
AORTIC VALVE MEAN VELOCITY: 10.3 M/S
APICAL FOUR CHAMBER EJECTION FRACTION: 16 %
ASCENDING AORTA: 3.2 CM (ref 1.85–2.77)
AST SERPL W P-5'-P-CCNC: 35 U/L (ref 5–45)
AV LVOT MEAN GRADIENT: 1 MMHG
AV LVOT PEAK GRADIENT: 2 MMHG
AV MEAN GRADIENT: 5 MMHG
AV PEAK GRADIENT: 10 MMHG
AV REGURGITATION PRESSURE HALF TIME: 427 MS
AV VELOCITY RATIO: 0.42
BILIRUB SERPL-MCNC: 0.66 MG/DL (ref 0.2–1)
BUN SERPL-MCNC: 44 MG/DL (ref 5–25)
CALCIUM ALBUM COR SERPL-MCNC: 9.5 MG/DL (ref 8.3–10.1)
CALCIUM SERPL-MCNC: 8.9 MG/DL (ref 8.3–10.1)
CHLORIDE SERPL-SCNC: 102 MMOL/L (ref 100–108)
CO2 SERPL-SCNC: 22 MMOL/L (ref 21–32)
CREAT SERPL-MCNC: 2.18 MG/DL (ref 0.6–1.3)
DOP CALC AO PEAK VEL: 1.62 M/S
DOP CALC AO VTI: 32.05 CM
DOP CALC LVOT PEAK VEL VTI: 12.1 CM
DOP CALC LVOT PEAK VEL: 0.68 M/S
E WAVE DECELERATION TIME: 233 MS
ERYTHROCYTE [DISTWIDTH] IN BLOOD BY AUTOMATED COUNT: 14.7 % (ref 11.6–15.1)
FRACTIONAL SHORTENING: 7 % (ref 28–44)
GFR SERPL CREATININE-BSD FRML MDRD: 24 ML/MIN/1.73SQ M
GLUCOSE SERPL-MCNC: 105 MG/DL (ref 65–140)
HCT VFR BLD AUTO: 37.5 % (ref 36.5–49.3)
HGB BLD-MCNC: 12.4 G/DL (ref 12–17)
INTERVENTRICULAR SEPTUM IN DIASTOLE (PARASTERNAL SHORT AXIS VIEW): 0.6 CM (ref 0.5–0.93)
INTERVENTRICULAR SEPTUM: 0.6 CM (ref 0.6–1.1)
LAAS-AP4: 21.2 CM2
LEFT ATRIUM SIZE: 4.6 CM
LEFT INTERNAL DIMENSION IN SYSTOLE: 5.7 CM (ref 2.35–3.56)
LEFT VENTRICLE DIASTOLIC VOLUME (MOD BIPLANE): 160 ML (ref 74.29–167.31)
LEFT VENTRICLE SYSTOLIC VOLUME (MOD BIPLANE): 134 ML
LEFT VENTRICULAR INTERNAL DIMENSION IN DIASTOLE: 6.1 CM (ref 3.83–5.7)
LEFT VENTRICULAR POSTERIOR WALL IN END DIASTOLE: 0.6 CM (ref 0.48–0.92)
LEFT VENTRICULAR STROKE VOLUME: 31 ML
LV EF: 16 %
LVSV (TEICH): 31 ML
MAGNESIUM SERPL-MCNC: 2.3 MG/DL (ref 1.6–2.6)
MCH RBC QN AUTO: 32.5 PG (ref 26.8–34.3)
MCHC RBC AUTO-ENTMCNC: 33.1 G/DL (ref 31.4–37.4)
MCV RBC AUTO: 98 FL (ref 82–98)
MV E'TISSUE VEL-LAT: 8 CM/S
MV PEAK A VEL: 0.68 M/S
MV PEAK E VEL: 86 CM/S
MV STENOSIS PRESSURE HALF TIME: 67 MS
MV VALVE AREA P 1/2 METHOD: 3.28 CM2
PHOSPHATE SERPL-MCNC: 4.4 MG/DL (ref 2.3–4.1)
PLATELET # BLD AUTO: 150 THOUSANDS/UL (ref 149–390)
PMV BLD AUTO: 11.5 FL (ref 8.9–12.7)
POTASSIUM SERPL-SCNC: 4.2 MMOL/L (ref 3.5–5.3)
PROT SERPL-MCNC: 6.7 G/DL (ref 6.4–8.2)
RA PRESSURE ESTIMATED: 8 MMHG
RBC # BLD AUTO: 3.82 MILLION/UL (ref 3.88–5.62)
RIGHT ATRIAL 2D VOLUME: 49 ML
RIGHT ATRIUM AREA SYSTOLE A4C: 17.2 CM2
RIGHT VENTRICLE ID DIMENSION: 3.5 CM
RV PSP: 45 MMHG
SL CV AV DECELERATION TIME RETROGRADE: 1474 MS
SL CV AV PEAK GRADIENT RETROGRADE: 53 MMHG
SL CV LV DIAS VOL ENDO Z SCORE: 1.69
SL CV LV EF: 25
SL CV PED ECHO LEFT VENTRICLE DIASTOLIC VOLUME (MOD BIPLANE) 2D: 189 ML
SL CV PED ECHO LEFT VENTRICLE SYSTOLIC VOLUME (MOD BIPLANE) 2D: 158 ML
SODIUM SERPL-SCNC: 131 MMOL/L (ref 136–145)
TR MAX PG: 37 MMHG
TR PEAK VELOCITY: 3 M/S
TRICUSPID VALVE PEAK REGURGITATION VELOCITY: 3.02 M/S
WBC # BLD AUTO: 10.65 THOUSAND/UL (ref 4.31–10.16)
Z-SCORE OF ASCENDING AORTA: 3.81
Z-SCORE OF INTERVENTRICULAR SEPTUM IN END DIASTOLE: -1.01
Z-SCORE OF LEFT VENTRICULAR DIMENSION IN END DIASTOLE: 2.68
Z-SCORE OF LEFT VENTRICULAR DIMENSION IN END SYSTOLE: 5.36
Z-SCORE OF LEFT VENTRICULAR POSTERIOR WALL IN END DIASTOLE: -0.9

## 2022-02-27 PROCEDURE — 80053 COMPREHEN METABOLIC PANEL: CPT | Performed by: PHYSICIAN ASSISTANT

## 2022-02-27 PROCEDURE — 83735 ASSAY OF MAGNESIUM: CPT | Performed by: PHYSICIAN ASSISTANT

## 2022-02-27 PROCEDURE — 99233 SBSQ HOSP IP/OBS HIGH 50: CPT | Performed by: GENERAL PRACTICE

## 2022-02-27 PROCEDURE — 93970 EXTREMITY STUDY: CPT | Performed by: SURGERY

## 2022-02-27 PROCEDURE — 93970 EXTREMITY STUDY: CPT

## 2022-02-27 PROCEDURE — 84100 ASSAY OF PHOSPHORUS: CPT | Performed by: PHYSICIAN ASSISTANT

## 2022-02-27 PROCEDURE — 85027 COMPLETE CBC AUTOMATED: CPT | Performed by: PHYSICIAN ASSISTANT

## 2022-02-27 PROCEDURE — 99233 SBSQ HOSP IP/OBS HIGH 50: CPT | Performed by: INTERNAL MEDICINE

## 2022-02-27 PROCEDURE — 97163 PT EVAL HIGH COMPLEX 45 MIN: CPT

## 2022-02-27 RX ORDER — ACETAMINOPHEN 325 MG/1
975 TABLET ORAL EVERY 8 HOURS SCHEDULED
Status: DISCONTINUED | OUTPATIENT
Start: 2022-02-27 | End: 2022-03-01 | Stop reason: HOSPADM

## 2022-02-27 RX ORDER — FUROSEMIDE 20 MG/1
20 TABLET ORAL
Status: DISCONTINUED | OUTPATIENT
Start: 2022-02-27 | End: 2022-03-01 | Stop reason: HOSPADM

## 2022-02-27 RX ORDER — MUSCLE RUB CREAM 100; 150 MG/G; MG/G
CREAM TOPICAL 4 TIMES DAILY PRN
Status: DISCONTINUED | OUTPATIENT
Start: 2022-02-27 | End: 2022-03-01 | Stop reason: HOSPADM

## 2022-02-27 RX ADMIN — PRAVASTATIN SODIUM 20 MG: 20 TABLET ORAL at 10:48

## 2022-02-27 RX ADMIN — HEPARIN SODIUM 5000 UNITS: 5000 INJECTION INTRAVENOUS; SUBCUTANEOUS at 21:55

## 2022-02-27 RX ADMIN — CLOPIDOGREL BISULFATE 75 MG: 75 TABLET ORAL at 10:48

## 2022-02-27 RX ADMIN — MENTHOL, METHYL SALICYLATE: 10; 15 CREAM TOPICAL at 00:32

## 2022-02-27 RX ADMIN — METOPROLOL TARTRATE 50 MG: 50 TABLET, FILM COATED ORAL at 18:04

## 2022-02-27 RX ADMIN — ACETAMINOPHEN 975 MG: 325 TABLET ORAL at 21:54

## 2022-02-27 RX ADMIN — ACETAMINOPHEN 650 MG: 325 TABLET ORAL at 01:50

## 2022-02-27 RX ADMIN — HEPARIN SODIUM 5000 UNITS: 5000 INJECTION INTRAVENOUS; SUBCUTANEOUS at 13:22

## 2022-02-27 RX ADMIN — HEPARIN SODIUM 5000 UNITS: 5000 INJECTION INTRAVENOUS; SUBCUTANEOUS at 06:15

## 2022-02-27 RX ADMIN — METOPROLOL TARTRATE 50 MG: 50 TABLET, FILM COATED ORAL at 10:48

## 2022-02-27 RX ADMIN — ACETAMINOPHEN 650 MG: 325 TABLET ORAL at 10:46

## 2022-02-27 RX ADMIN — ASPIRIN 81 MG: 81 TABLET, COATED ORAL at 10:48

## 2022-02-27 RX ADMIN — FUROSEMIDE 20 MG: 20 TABLET ORAL at 18:03

## 2022-02-27 NOTE — PHYSICAL THERAPY NOTE
Physical Therapy Evaluation    Patient's Name: Rollen Kussmaul    Admitting Diagnosis  Hypercholesterolemia [E78 00]  Chest tightness [R07 89]  [facility] [I50 9]  Acute on chronic HFrEF (heart failure with reduced ejection fraction) ([facility] ) [I50 23]    Problem List  Patient Active Problem List   Diagnosis    [facility], stage 3 [facility]    Benign hypertensive CKD, stage 1-4 or unspecified chronic kidney disease    Chronic kidney disease-mineral and bone disorder    [facility] on [facility] with reduced [facility] fraction[facility]    Chronic renal disease, stage IV ([facility] )    Coronary artery disease    Hypertension    Hypercholesterolemia    Bilateral calf pain       Past Medical History  Past Medical History:   Diagnosis Date    Chronic renal disease, stage III ([facility] )     Chronic renal disease, stage III ([facility] )     Essential hypertension, benign     Essential hypertension, benign        Past Surgical History  Past Surgical History:   Procedure Laterality Date    CORONARY ARTERY BYPASS GRAFT  2012    x4          02/27/22 0830   PT Last Visit   PT Visit Date 02/27/22   Note Type   Note type Evaluation   Pain Assessment   Pain Assessment Tool 0-10   Pain Score 4   Pain Location/Orientation Location: Leg;Orientation: Bilateral   Patient's Stated Pain Goal No pain   Hospital Pain Intervention(s) Ambulation/increased activity   Restrictions/Precautions   Weight Bearing Precautions Per Order No   Other Precautions Multiple lines; Fall Risk;Pain; Chair Alarm; Bed Alarm;Cognitive; Impulsive   Home Living   Type of Home Apartment;Assisted living   Additional Comments Unclear as to living situation, states he lives alone in apt, however, has meals prepared in dining area  does not need to do stairs    ambulates w/ RW   does not drive   Prior Function   Level of [place] Independent with ADLs and functional mobility   Falls in the last 6 months 1 to 4   General   Family/Caregiver Present No   Cognition   Overall Cognitive Status Impaired   Arousal/Participation Responsive   Orientation Level Oriented to person;Oriented to place;Oriented to situation   Memory Unable to assess   Following Commands Follows one step commands without difficulty   RLE Assessment   RLE Assessment   (strength grossly 4-/5)   LLE Assessment   LLE Assessment   (strength grossly 4-/5)   Transfers   Sit to Stand 5  Supervision   Additional items Assist x 1; Increased time required   Stand to Sit 5  Supervision   Additional items Assist x 1; Increased time required   Ambulation/Elevation   Gait pattern   (slow, short step length, mild ataxia)   Gait Assistance 4  Minimal assist   Additional items Assist x 1   Assistive Device Rolling walker   Distance 80'x1   Balance   Static Sitting Normal   Dynamic Sitting Good   Static Standing Fair   Dynamic Standing Fair   Ambulatory Fair -   Endurance Deficit   Endurance Deficit Yes   Endurance Deficit Description mild fatigue   Activity Tolerance   Activity Tolerance Patient limited by fatigue;Patient limited by pain;Treatment limited secondary to medical complications (Comment)   Nurse Made Aware yes   Assessment   Prognosis Good   Problem List Decreased strength;Decreased endurance; Impaired balance;Decreased mobility;Pain   Assessment Pt seen for physical therapy evaluation  Pt is a 81 y/o male w/ history/comorbidities of CKD, HTN, CAD, CABG who is now admitted w/ worsening SOB, WILCOX, inability to lay flat, and chest tightness  Dx is acute on chronic CHF  Due to acute medical issues, pain, fall risk, note unstable clinical picture  PT consulted to assess mobility, d/c needs  Pt presents w/ decreased functional mob, standing balance, endurance, B LE strength  Pt will benefit from skilled PT to correct for the above problems  anticipate d/c directly back to his current living sitatuon when stable, recommend home PT if endurance slow to progress       Goals   Patient Goals to go home   STG Expiration Date 03/13/22   Short Term Goal #1 1-2 wks: bed mob and transfers w/ indep, standing balance to good/normal w/ device, ambulate 200 ft w/ RW and S/mod I, increase B LE strength by 1/2 -1 grade,    PT Treatment Day 0   Plan   Treatment/Interventions Functional transfer training;LE strengthening/ROM; Therapeutic exercise; Endurance training;Patient/family training;Gait training;Bed mobility; Equipment eval/education   PT Frequency 3-5x/wk   Recommendation   PT Discharge Recommendation Home with home health rehabilitation  (home PT of endurance slow to progress)   AM-PAC Basic Mobility Inpatient   Turning in Bed Without Bedrails 3   Lying on Back to Sitting on Edge of Flat Bed 3   Moving Bed to Chair 3   Standing Up From Chair 4   Walk in Room 3   Climb 3-5 Stairs 2   Basic Mobility Inpatient Raw Score 18   Basic Mobility Standardized Score 41 05   Highest Level Of Mobility   JH-HLM Goal 6: Walk 10 steps or more   JH-HLM Highest Level of Mobility 7: Walk 25 feet or more         Darlene Brand PT, DPT, CSRS

## 2022-02-27 NOTE — PROGRESS NOTES
[facility]  Progress Note - Ramesh Hockey 12/6/1926, 80 y o  male MRN: [id number]  Unit/Bed#: [facility] 409-01 Encounter: [id number]  Primary Care Provider: Mark Duarte DO   Date and time admitted to hospital: 2/26/2022  2:59 AM    * Acute on chronic HFrEF (heart failure with reduced ejection fraction) ([facility] [address] )  Assessment & Plan  Wt Readings from Last 3 Encounters:   02/27/22 57 7 kg (127 lb 3 3 oz)   12/08/21 59 9 kg (132 lb)   08/26/21 58 kg (127 lb 12 8 oz)     · One month history of increasing dyspnea presenting with worsening dyspnea and chest tightness tonight  · CXR with evidence of pulmonary edema additionally noted with elevated NT-BNP pro concerning for acute on chronic heart failure  · Patient is otherwise hemodynamically stable, maintaining appropriate saturations on room air  · Diuresed 3L w/ IV Lasix - today put on increased dose of home Lasix  · updated echocardiogram showed EF 25% which is more or less unchanged from prior echocardiogram showing EF 25-30%  · Cardiology appreciated  · Low-sodium, fluid-restricted diet  · Monitor daily weights, I/O, volume status, electrolytes/renal function        Bilateral calf pain  Assessment & Plan  · Check LEVDs for completeness    Hypercholesterolemia  Assessment & Plan  · Continue statin therapy    Hypertension  Assessment & Plan  · Continue home antihypertensives with strict hold parameters  · Monitor blood pressure per protocol    Coronary artery disease  Assessment & Plan  · S/p prior CABG  · Continue ASA, Plavix, beta-blocker, statin  · HS-trops < 50 and delta < 20  · Echo unchanged    CKD (chronic kidney disease) stage 4, GFR 15-29 ml/min [facility])  Assessment & Plan  Lab Results   Component Value Date    EGFR 24 02/27/2022    EGFR 26 02/26/2022    EGFR 26 02/26/2022    CREATININE 2 18 (H) 02/27/2022    CREATININE 2 07 (H) 02/26/2022    CREATININE 2 05 (H) 02/26/2022   · Estimated Creatinine Clearance: 16 5 mL/min (A) (by C-G formula based on SCr of 2 18 mg/dL (H))  · Creatinine at baseline 2 1-2 2  · Recheck Cr tomorrow for stability      VTE Pharmacologic Prophylaxis: VTE Score: 4 Moderate Risk (Score 3-4) - Pharmacological DVT Prophylaxis Ordered: heparin  Patient Centered Rounds: I performed bedside rounds with nursing staff today  Discussions with Specialists or Other Care Team Provider: cardio    Education and Discussions with Family / Patient: Updated  (son) at bedside  Time Spent for Care: 30 minutes  More than 50% of total time spent on counseling and coordination of care as described above  Current Length of Stay: 1 day(s)  Current Patient Status: Inpatient   Certification Statement: The patient will continue to require additional inpatient hospital stay due to need to monitor Cr and LEVDs  Discharge Plan: Anticipate discharge tomorrow to prior assisted or independent living facility  Code Status: Level 3 - DNAR and DNI    Subjective:   C/o calf pain    Objective:     Vitals:   Temp (24hrs), Av 8 °F (36 6 °C), Min:97 3 °F (36 3 °C), Max:98 4 °F (36 9 °C)    Temp:  [97 3 °F (36 3 °C)-98 4 °F (36 9 °C)] 97 5 °F (36 4 °C)  HR:  [66-85] 76  Resp:  [18-21] 18  BP: (108-128)/(63-79) 108/63  SpO2:  [93 %-97 %] 93 %  Body mass index is 21 17 kg/m²  Input and Output Summary (last 24 hours): Intake/Output Summary (Last 24 hours) at 2022 1528  Last data filed at 2022 1348  Gross per 24 hour   Intake 480 ml   Output 2250 ml   Net -1770 ml       Physical Exam:   Physical Exam  HENT:      Head: Normocephalic and atraumatic  Nose: Nose normal       Mouth/Throat:      Mouth: Mucous membranes are moist    Eyes:      Extraocular Movements: Extraocular movements intact  Conjunctiva/sclera: Conjunctivae normal    Cardiovascular:      Rate and Rhythm: Normal rate and regular rhythm  Pulmonary:      Effort: Pulmonary effort is normal       Breath sounds: Normal breath sounds   No wheezing or rales  Abdominal:      General: Bowel sounds are normal  There is no distension  Palpations: Abdomen is soft  Tenderness: There is no abdominal tenderness  Musculoskeletal:         General: Normal range of motion  Cervical back: Normal range of motion and neck supple  Right lower leg: No edema  Left lower leg: No edema  Skin:     General: Skin is warm and dry  Neurological:      Mental Status: He is alert and oriented to person, place, and time  Additional Data:     Labs:  Results from last 7 days   Lab Units 02/27/22  0151 02/26/22  1003 02/26/22  0402   WBC Thousand/uL 10 65*  --  12 89*   HEMOGLOBIN g/dL 12 4  --  12 4   HEMATOCRIT % 37 5  --  38 4   PLATELETS Thousands/uL 150   < > 152   NEUTROS PCT %  --   --  82*   LYMPHS PCT %  --   --  6*   MONOS PCT %  --   --  6   EOS PCT %  --   --  4    < > = values in this interval not displayed       Results from last 7 days   Lab Units 02/27/22  0151   SODIUM mmol/L 131*   POTASSIUM mmol/L 4 2   CHLORIDE mmol/L 102   CO2 mmol/L 22   BUN mg/dL 44*   CREATININE mg/dL 2 18*   ANION GAP mmol/L 7   CALCIUM mg/dL 8 9   ALBUMIN g/dL 3 2*   TOTAL BILIRUBIN mg/dL 0 66   ALK PHOS U/L 90   ALT U/L 75   AST U/L 35   GLUCOSE RANDOM mg/dL 105                       Lines/Drains:  Invasive Devices  Report    Peripheral Intravenous Line            Peripheral IV 02/26/22 Right Antecubital 1 day                  Telemetry:  Telemetry Orders (From admission, onward)             48 Hour Telemetry Monitoring  Continuous x 48 hours        References:    Telemetry Guidelines   Question:  Reason for 48 Hour Telemetry  Answer:  Acute Decompensated CHF (continuous diuretic infusion or total diuretic dose > 200 mg daily, associated electrolyte derangement, ionotropic drip, history of ventricular arrhythmia, or new EF <35%)                 Telemetry Reviewed: Normal Sinus Rhythm  Indication for Continued Telemetry Use: Acute CHF on >200 mg lasix/day or equivalent dose or with new reduced EF  Imaging: No pertinent imaging reviewed  Recent Cultures (last 7 days):         Last 24 Hours Medication List:   Current Facility-Administered Medications   Medication Dose Route Frequency Provider Last Rate    acetaminophen  975 mg Oral [facility] DO Ifeanyi      aspirin  81 mg Oral Daily Fredy Royals,       clopidogrel  75 mg Oral Daily Fredy Royals, DO      furosemide  20 mg Oral BID (diuretic) Coby Vora,       heparin (porcine)  5,000 Units Subcutaneous Q8H [address] Fredy Royals, DO      menthol-methyl salicylate   Apply externally 4x Daily PRN BULMARO Adamson      metoprolol tartrate  50 mg Oral BID Fredy Royals, DO      pravastatin  20 mg Oral Daily Fredy Shaffer, DO          Today, Patient Was Seen By: DO Ifeanyi    **Please Note: This note may have been constructed using a voice recognition system  **

## 2022-02-27 NOTE — PROGRESS NOTES
Cardiology Progress Note - Cathryn Merino  male MRN: [id number]    Unit/Bed#: [facility] 409-01 Encounter: [id number]      Assessment:  1  Acute on chronic HFrEF  2  Ischemic cardiomyopathy - EF 25 to 30%  3  Moderate to severe mitral regurgitation  4  CAD status post remote CABG  5   CKD III  6  Dyslipidemia     Plan:  1  Net negative fluid balance yesterday with decreasing weight  2  Creatinine slightly increased, off oxygen  3  Will transition to furosemide 20 mg twice daily starting this evening  4  Continue to hold ARB  5  Continue metoprolol   6  Echo completed - will review, will not  given advanced age and co-morbidities  7  Telemetry reviewed, ECG yesterday reviewed - normal sinus with PACs      Subjective:   Patient seen and examined  No significant events overnight  Objective:     Vitals: Blood pressure 110/70, pulse 80, temperature (!) 97 3 °F (36 3 °C), temperature source Oral, resp  rate 20, height 5' 5" (1 651 m), weight 57 7 kg (127 lb 3 3 oz), SpO2 95 %  , Body mass index is 21 17 kg/m² ,   Orthostatic Blood Pressures      Most Recent Value   Blood Pressure 110/70 filed at 02/27/2022 9972   Patient Position - Orthostatic VS Lying filed at 02/26/2022 2252            Intake/Output Summary (Last 24 hours) at 2/27/2022 2289  Last data filed at 2/27/2022 9219  Gross per 24 hour   Intake 120 ml   Output 3750 ml   Net -3630 ml         Physical Exam:    GEN: Aisha Alexander appears well, alert and oriented x 3, pleasant and cooperative   HEENT: pupils equal, round, and reactive to light; extraocular muscles intact  NECK: supple, no carotid bruits, no elevated JVP  HEART: regular rhythm, normal S1 and S2, 2/6 holosystolic murmur at apex  LUNGS: clear to auscultation bilaterally; no wheezes, rales, or rhonchi   ABDOMEN: normal bowel sounds, soft, no tenderness, no distention  EXTREMITIES:  No edema  NEURO: no focal findings   SKIN: normal without suspicious lesions on exposed skin    Medications:      Current Facility-Administered Medications:     acetaminophen (TYLENOL) tablet 650 mg, 650 mg, Oral, Q8H PRN, Moultrie Sos, DO, 650 mg at 02/27/22 0150    aspirin (ECOTRIN LOW STRENGTH) EC tablet 81 mg, 81 mg, Oral, Daily, Moultrie Sos, DO, 81 mg at 02/26/22 1000    clopidogrel (PLAVIX) tablet 75 mg, 75 mg, Oral, Daily, Moultrie Sos, DO, 75 mg at 02/26/22 1000    furosemide (LASIX) injection 40 mg, 40 mg, Intravenous, BID (diuretic), Olivia Jeovanny, DO, 40 mg at 02/26/22 1543    heparin (porcine) subcutaneous injection 5,000 Units, 5,000 Units, Subcutaneous, Q8H [facility], 5,000 Units at 02/27/22 0615 **AND** [COMPLETED] Platelet count, , , Once, Gypsy Sos, DO    menthol-methyl salicylate (BENGAY) 70-37 % cream, , Apply externally, 4x Daily PRN, Lisa Garcia PA-C, Given at 02/27/22 0032    metoprolol tartrate (LOPRESSOR) tablet 50 mg, 50 mg, Oral, BID, Gypsy Sos, DO, 50 mg at 02/26/22 1800    pravastatin (PRAVACHOL) tablet 20 mg, 20 mg, Oral, Daily, Moultrie Sos, DO, 20 mg at 02/26/22 0959     Labs & Results:        Results from last 7 days   Lab Units 02/27/22  0151 02/26/22  1003 02/26/22  0402   WBC Thousand/uL 10 65*  --  12 89*   HEMOGLOBIN g/dL 12 4  --  12 4   HEMATOCRIT % 37 5  --  38 4   PLATELETS Thousands/uL 150 151 152     Results from last 7 days   Lab Units 02/26/22  0639   TRIGLYCERIDES mg/dL 67   HDL mg/dL 51     Results from last 7 days   Lab Units 02/27/22  0151 02/26/22  1003 02/26/22  0402   POTASSIUM mmol/L 4 2 4 2 4 5   CHLORIDE mmol/L 102 110* 109*   CO2 mmol/L 22 21 22   BUN mg/dL 44* 41* 48*   CREATININE mg/dL 2 18* 2 07* 2 05*   CALCIUM mg/dL 8 9 9 0 9 0   ALK PHOS U/L 90  --  102   ALT U/L 75  --  100*   AST U/L 35  --  68*         Results from last 7 days   Lab Units 02/27/22  0151 02/26/22  0402   MAGNESIUM mg/dL 2 3 2 5     Counseling / Coordination of Care  Total floor / unit time spent today 25 minutes    Greater than 50% of total time was spent with the patient and / or family counseling and / or coordination of care

## 2022-02-27 NOTE — ASSESSMENT & PLAN NOTE
· S/p prior CABG  · Continue ASA, Plavix, beta-blocker, statin  · HS-trops < 50 and delta < 20  · Echo unchanged

## 2022-02-27 NOTE — ASSESSMENT & PLAN NOTE
Lab Results   Component Value Date    EGFR 24 02/27/2022    EGFR 26 02/26/2022    EGFR 26 02/26/2022    CREATININE 2 18 (H) 02/27/2022    CREATININE 2 07 (H) 02/26/2022    CREATININE 2 05 (H) 02/26/2022   · Estimated Creatinine Clearance: 16 5 mL/min (A) (by C-G formula based on SCr of 2 18 mg/dL (H))    · Creatinine at baseline 2 1-2 2  · Recheck Cr tomorrow for stability

## 2022-02-27 NOTE — ASSESSMENT & PLAN NOTE
Wt Readings from Last 3 Encounters:   02/27/22 57 7 kg (127 lb 3 3 oz)   12/08/21 59 9 kg (132 lb)   08/26/21 58 kg (127 lb 12 8 oz)     · One month history of increasing dyspnea presenting with worsening dyspnea and chest tightness tonight  · CXR with evidence of pulmonary edema additionally noted with elevated NT-BNP pro concerning for acute on chronic heart failure  · Patient is otherwise hemodynamically stable, maintaining appropriate saturations on room air  · Diuresed 3L w/ IV Lasix - today put on increased dose of home Lasix  · updated echocardiogram showed EF 25% which is more or less unchanged from prior echocardiogram showing EF 25-30%  · Cardiology appreciated  · Low-sodium, fluid-restricted diet  · Monitor daily weights, I/O, volume status, electrolytes/renal function

## 2022-02-27 NOTE — PLAN OF CARE
Problem: PHYSICAL THERAPY ADULT  Goal: Performs mobility at highest level of function for planned discharge setting  See evaluation for individualized goals  Description: Treatment/Interventions: Functional transfer training,LE strengthening/ROM,Therapeutic exercise,Endurance training,Patient/family training,Gait training,Bed mobility,Equipment eval/education          See flowsheet documentation for full assessment, interventions and recommendations  Note: Prognosis: Good  Problem List: Decreased strength,Decreased endurance,Impaired balance,Decreased mobility,Pain  Assessment: Pt seen for physical therapy evaluation  Pt is a 79 y/o male w/ history/comorbidities of CKD, HTN, CAD, CABG who is now admitted w/ worsening SOB, WILCOX, inability to lay flat, and chest tightness  Dx is acute on chronic CHF  Due to acute medical issues, pain, fall risk, note unstable clinical picture  PT consulted to assess mobility, d/c needs  Pt presents w/ decreased functional mob, standing balance, endurance, B LE strength  Pt will benefit from skilled PT to correct for the above problems  anticipate d/c directly back to his current living sitatuon when stable, recommend home PT if endurance slow to progress  PT Discharge Recommendation: Home with home health rehabilitation (home PT of endurance slow to progress)          See flowsheet documentation for full assessment

## 2022-02-28 ENCOUNTER — EPISODE CHANGES (OUTPATIENT)
Dept: CASE MANAGEMENT | Facility: OTHER | Age: 87
End: 2022-02-28

## 2022-02-28 PROBLEM — I49.3 PVC'S (PREMATURE VENTRICULAR CONTRACTIONS): Status: ACTIVE | Noted: 2022-02-28

## 2022-02-28 LAB
ANION GAP SERPL CALCULATED.3IONS-SCNC: 8 MMOL/L (ref 4–13)
ATRIAL RATE: 71 BPM
BUN SERPL-MCNC: 44 MG/DL (ref 5–25)
CALCIUM SERPL-MCNC: 8.5 MG/DL (ref 8.3–10.1)
CHLORIDE SERPL-SCNC: 104 MMOL/L (ref 100–108)
CO2 SERPL-SCNC: 23 MMOL/L (ref 21–32)
CREAT SERPL-MCNC: 2.26 MG/DL (ref 0.6–1.3)
GFR SERPL CREATININE-BSD FRML MDRD: 23 ML/MIN/1.73SQ M
GLUCOSE SERPL-MCNC: 81 MG/DL (ref 65–140)
P AXIS: 20 DEGREES
POTASSIUM SERPL-SCNC: 3.9 MMOL/L (ref 3.5–5.3)
PR INTERVAL: 148 MS
QRS AXIS: 12 DEGREES
QRSD INTERVAL: 152 MS
QT INTERVAL: 464 MS
QTC INTERVAL: 511 MS
SODIUM SERPL-SCNC: 135 MMOL/L (ref 136–145)
T WAVE AXIS: 197 DEGREES
VENTRICULAR RATE: 73 BPM

## 2022-02-28 PROCEDURE — 93010 ELECTROCARDIOGRAM REPORT: CPT | Performed by: INTERNAL MEDICINE

## 2022-02-28 PROCEDURE — 97530 THERAPEUTIC ACTIVITIES: CPT

## 2022-02-28 PROCEDURE — 99232 SBSQ HOSP IP/OBS MODERATE 35: CPT | Performed by: INTERNAL MEDICINE

## 2022-02-28 PROCEDURE — 97167 OT EVAL HIGH COMPLEX 60 MIN: CPT

## 2022-02-28 PROCEDURE — 93005 ELECTROCARDIOGRAM TRACING: CPT

## 2022-02-28 PROCEDURE — 99232 SBSQ HOSP IP/OBS MODERATE 35: CPT | Performed by: GENERAL PRACTICE

## 2022-02-28 PROCEDURE — 80048 BASIC METABOLIC PNL TOTAL CA: CPT | Performed by: GENERAL PRACTICE

## 2022-02-28 RX ORDER — POTASSIUM CHLORIDE 750 MG/1
10 TABLET, EXTENDED RELEASE ORAL ONCE
Status: COMPLETED | OUTPATIENT
Start: 2022-02-28 | End: 2022-02-28

## 2022-02-28 RX ADMIN — PRAVASTATIN SODIUM 20 MG: 20 TABLET ORAL at 09:20

## 2022-02-28 RX ADMIN — HEPARIN SODIUM 5000 UNITS: 5000 INJECTION INTRAVENOUS; SUBCUTANEOUS at 14:01

## 2022-02-28 RX ADMIN — ACETAMINOPHEN 975 MG: 325 TABLET ORAL at 06:21

## 2022-02-28 RX ADMIN — POTASSIUM CHLORIDE 10 MEQ: 750 TABLET, EXTENDED RELEASE ORAL at 10:26

## 2022-02-28 RX ADMIN — FUROSEMIDE 20 MG: 20 TABLET ORAL at 15:32

## 2022-02-28 RX ADMIN — ACETAMINOPHEN 975 MG: 325 TABLET ORAL at 22:28

## 2022-02-28 RX ADMIN — ASPIRIN 81 MG: 81 TABLET, COATED ORAL at 09:20

## 2022-02-28 RX ADMIN — HEPARIN SODIUM 5000 UNITS: 5000 INJECTION INTRAVENOUS; SUBCUTANEOUS at 06:22

## 2022-02-28 RX ADMIN — FUROSEMIDE 20 MG: 20 TABLET ORAL at 09:20

## 2022-02-28 RX ADMIN — METOPROLOL TARTRATE 50 MG: 50 TABLET, FILM COATED ORAL at 10:26

## 2022-02-28 RX ADMIN — METOPROLOL TARTRATE 50 MG: 50 TABLET, FILM COATED ORAL at 17:10

## 2022-02-28 RX ADMIN — ACETAMINOPHEN 975 MG: 325 TABLET ORAL at 14:01

## 2022-02-28 RX ADMIN — CLOPIDOGREL BISULFATE 75 MG: 75 TABLET ORAL at 09:20

## 2022-02-28 RX ADMIN — HEPARIN SODIUM 5000 UNITS: 5000 INJECTION INTRAVENOUS; SUBCUTANEOUS at 22:28

## 2022-02-28 NOTE — PLAN OF CARE
Problem: PHYSICAL THERAPY ADULT  Goal: Performs mobility at highest level of function for planned discharge setting  See evaluation for individualized goals  Description: Treatment/Interventions: Functional transfer training,LE strengthening/ROM,Therapeutic exercise,Endurance training,Patient/family training,Gait training,Bed mobility,Equipment eval/education          See flowsheet documentation for full assessment, interventions and recommendations  Outcome: Progressing  Note: Prognosis: Good  Problem List: Decreased strength,Decreased endurance,Impaired balance,Decreased mobility  Assessment: Pt demonstrated overall improvement in balance, endurance and all aspects of observed mobility progressing to mod (I) level w/ transfers and (S) w/ amb w/ rw; no overt uncorrected LOB, gross knee buckling, or swaying observed and overall mobility status appears to be at or near premorbid level; based on above, anticipate pt will return back to [facility] upon D/C when medically cleared; home PT follow up is recommended; will follow  PT Discharge Recommendation: Home with home health rehabilitation (home PT)          See flowsheet documentation for full assessment

## 2022-02-28 NOTE — ASSESSMENT & PLAN NOTE
Lab Results   Component Value Date    EGFR 23 02/28/2022    EGFR 24 02/27/2022    EGFR 26 02/26/2022    CREATININE 2 26 (H) 02/28/2022    CREATININE 2 18 (H) 02/27/2022    CREATININE 2 07 (H) 02/26/2022   · Estimated Creatinine Clearance: 15 8 mL/min (A) (by C-G formula based on SCr of 2 26 mg/dL (H))  · Creatinine at baseline 2 1-2 2  · Recheck Cr tomorrow for stability  OK to d/c tomorrow as long as Cr not increasing

## 2022-02-28 NOTE — PLAN OF CARE
Problem: OCCUPATIONAL THERAPY ADULT  Goal: Performs self-care activities at highest level of function for planned discharge setting  See evaluation for individualized goals  Description: Treatment Interventions: ADL retraining,Functional transfer training,Endurance training,Cognitive reorientation,Compensatory technique education,Continued evaluation,Activityengagement,Energy conservation          See flowsheet documentation for full assessment, interventions and recommendations  Note: Limitation: Decreased ADL status,Decreased cognition,Decreased Safe judgement during ADL,Decreased endurance,Decreased self-care trans,Decreased high-level ADLs  Prognosis: Fair  Assessment: Pt is 80 y o  male admitted to [facility] on 2022 w/ acute on chronic heart failure with reduced ejection fraction  Pt  has a past medical history of Chronic renal disease, stage III ([facility] [address] ), Chronic renal disease, stage III ([facility] [address] ), Essential hypertension, benign, and Essential hypertension, benign    Pt with active OT orders and activity orders  Pt resides in a Assisted Living, with alone  Pt was I w  ADLs, and receiving A with IADLs  Pt is a questionable poor historian  Pt is currently functioning at S for eating, transfers, min A for functional mobility and UB ADLs, and mod A for LB ADLs  Pt is limited 2* pain, endurance, activity tolerance, functional mobility, functional standing tolerance and decreased I w/ ADLS/IADLS  The Areas of Occupational Performance Areas to address w/ pt include: eating, grooming, bathing/shower, toilet hygiene, dressing, health maintenance and functional mobility  OT recommendation for d/c includes return to facility dependent on available support  Pt would benefit from continued acute OT services for  3-5x/week to  w/in 10-14 days:  to address functional goals        OT Discharge Recommendation: Return to facility with rehabilitation services (pending available support)  OT - OK to Discharge: Yes (when medically appropriate)

## 2022-02-28 NOTE — PLAN OF CARE
Problem: MOBILITY - ADULT  Goal: Maintain or return to baseline ADL function  Description: INTERVENTIONS:  -  Assess patient's ability to carry out ADLs; assess patient's baseline for ADL function and identify physical deficits which impact ability to perform ADLs (bathing, care of mouth/teeth, toileting, grooming, dressing, etc )  - Assess/evaluate cause of self-care deficits   - Assess range of motion  - Assess patient's mobility; develop plan if impaired  - Assess patient's need for assistive devices and provide as appropriate  - Encourage maximum independence but intervene and supervise when necessary  - Involve family in performance of ADLs  - Assess for home care needs following discharge   - Consider OT consult to assist with ADL evaluation and planning for discharge  - Provide patient education as appropriate  Outcome: Progressing  Goal: Maintains/Returns to pre admission functional level  Description: INTERVENTIONS:  - Perform BMAT or MOVE assessment daily    - Set and communicate daily mobility goal to care team and patient/family/caregiver     - Collaborate with rehabilitation services on mobility goals if consulted  - Ambulate patient 4 times a day  - Out of bed to chair 3 times a day   - Out of bed for meals 3 times a day  - Out of bed for toileting  - Record patient progress and toleration of activity level   Outcome: Progressing     Problem: CARDIOVASCULAR - ADULT  Goal: Maintains optimal cardiac output and hemodynamic stability  Description: INTERVENTIONS:  - Monitor I/O, vital signs and rhythm  - Monitor for S/S and trends of decreased cardiac output  - Administer and titrate ordered vasoactive medications to optimize hemodynamic stability  - Assess quality of pulses, skin color and temperature  - Assess for signs of decreased coronary artery perfusion  - Instruct patient to report change in severity of symptoms  Outcome: Progressing     Problem: Prexisting or High Potential for Compromised Skin Integrity  Goal: Skin integrity is maintained or improved  Description: INTERVENTIONS:  - Identify patients at risk for skin breakdown  - Assess and monitor skin integrity  - Assess and monitor nutrition and hydration status  - Monitor labs   - Assess for incontinence   - Turn and reposition patient  - Assist with mobility/ambulation  - Relieve pressure over bony prominences  - Avoid friction and shearing  - Provide appropriate hygiene as needed including keeping skin clean and dry  - Evaluate need for skin moisturizer/barrier cream  - Collaborate with interdisciplinary team   - Patient/family teaching  - Consider wound care consult   Outcome: Progressing     Problem: Nutrition/Hydration-ADULT  Goal: Nutrient/Hydration intake appropriate for improving, restoring or maintaining nutritional needs  Description: Monitor and assess patient's nutrition/hydration status for malnutrition  Collaborate with interdisciplinary team and initiate plan and interventions as ordered  Monitor patient's weight and dietary intake as ordered or per policy  Utilize nutrition screening tool and intervene as necessary  Determine patient's food preferences and provide high-protein, high-caloric foods as appropriate       INTERVENTIONS:  - Monitor oral intake, urinary output, labs, and treatment plans  - Assess nutrition and hydration status and recommend course of action  - Evaluate amount of meals eaten  - Assist patient with eating if necessary   - Allow adequate time for meals  - Recommend/ encourage appropriate diets, oral nutritional supplements, and vitamin/mineral supplements  - Order, calculate, and assess calorie counts as needed  - Recommend, monitor, and adjust tube feedings and TPN/PPN based on assessed needs  - Assess need for intravenous fluids  - Provide specific nutrition/hydration education as appropriate  - Include patient/family/caregiver in decisions related to nutrition  Outcome: Progressing     Problem: Potential for Falls  Goal: Patient will remain free of falls  Description: INTERVENTIONS:  - Educate patient/family on patient safety including physical limitations  - Instruct patient to call for assistance with activity   - Consult OT/PT to assist with strengthening/mobility   - Keep Call bell within reach  - Keep bed low and locked with side rails adjusted as appropriate  - Keep care items and personal belongings within reach  - Initiate and maintain comfort rounds  - Make Fall Risk Sign visible to staff  - Offer Toileting every 3 Hours, in advance of need  - Initiate/Maintain chair alarm  - Obtain necessary fall risk management equipment: walker, nonskid socks  - Apply yellow socks and bracelet for high fall risk patients  - Consider moving patient to room near nurses station  Outcome: Progressing

## 2022-02-28 NOTE — CASE MANAGEMENT
Case Management Assessment & Discharge Planning Note    Patient name Ivis Yousif  [facility] MRN [id number]  : 1926 Date 2022       Current Admission Date: 2022  Current Admission Diagnosis:Acute on chronic HFrEF (heart failure with reduced ejection fraction) [facility])   Patient Active Problem List    Diagnosis Date Noted    Bilateral calf pain 2022    Chronic renal disease, stage IV ([facility] ) 2021    Chronic kidney disease-mineral and bone disorder 2021    Acute on chronic HFrEF (heart failure with reduced ejection fraction) ([facility] ) 2021    CKD (chronic kidney disease) stage 4, GFR 15-29 ml/min ([facility] ) 2013    Coronary artery disease 2013    Hypertension 2013    Benign hypertensive CKD, stage 1-4 or unspecified chronic kidney disease 2013    Hypercholesterolemia 10/08/2012      LOS (days): 2  Geometric Mean LOS (GMLOS) (days): 3 80  Days to GMLOS:1 4     OBJECTIVE:    Risk of Unplanned Readmission Score: 13         Current admission status: Inpatient       Preferred Pharmacy:   [address] [place] [address]  Phone: [phone] Fax: [phone]    Primary Care Provider: Laura Hancock DO    Primary Insurance: MEDICARE  Secondary Insurance: BLUE CROSS    ASSESSMENT:  [id number] [facility], [address] Representative - Son   Primary Phone: [phone] (Mobile)  Home Phone: [phone]               Advance Directives  Does patient have a [address]?: No (pt unsure)  Was patient offered paperwork?: No  Does patient currently have a Health Care decision maker?: Yes, please see Health Care Proxy section  Does patient have Advance Directives? :  (pt unsure)  Advance Directives: Living will,Power of  for health care  Primary Contact: whitney Scott         Readmission Root Cause  30 Day Readmission: No    Patient Information  Admitted from[de-identified] Facility  Mental Status: Alert  During Assessment patient was accompanied by: Not accompanied during assessment  Assessment information provided by[de-identified] Patient  Primary Caregiver: Self  Support Systems: Son,Other (Comment) ([facility])  [place] of Residence: [address] [facility] do you live in?: [address] entry access options   Select all that apply : No steps to enter home  Type of Current Residence: Apartment,Facility [facility]isted living)  Floor Level: 1  Upon entering residence, is there a bedroom on the main floor (no further steps)?: Yes  Upon entering residence, is there a bathroom on the main floor (no further steps)?: Yes  In the last 12 months, was there a time when you were not able to pay the mortgage or rent on time?: No  In the last 12 months, was there a time when you did not have a steady place to sleep or slept in a shelter (including now)?: No  Homeless/housing insecurity resource given?: Refused  Living Arrangements: Lives Alone  Is patient a ?:  (unknown)    Activities of Daily Living Prior to Admission  Functional Status: Assistance  Completes ADLs independently?: Yes  Ambulates independently?: Yes  Does patient use assisted devices?: Yes  Assisted Devices (DME) used: Marbella Aquino  Does patient currently own DME?: Yes  What DME does the patient currently own?: Marbella Aquino  Does patient have a history of Outpatient Therapy (PT/OT)?: No  Does the patient have a history of Short-Term Rehab?: No  Does patient have a history of HHC?: No  Does patient currently have [facility]?: No         Patient Information Continued  Income Source: Pension/long term  Does patient have prescription coverage?: Yes  Within the past 12 months, you worried that your food would run out before you got the money to buy more : Never true  Within the past 12 months, the food you bought just didnt last and you didnt have money to get more : Never true  Food insecurity resource given?: N/A  Does patient receive dialysis treatments?: No  Does patient have a history of substance abuse?: No  Does patient have a history of Mental Health Diagnosis?: No         Means of Transportation  Means of Transport to Appts[de-identified] Other (Comment) ([facility])        DISCHARGE DETAILS:    Discharge planning discussed with[de-identified] pt--return to [facility] when ready  Freedom of Choice: Yes        Were Treatment Team discharge recommendations reviewed with patient/caregiver?: Yes  Did patient/caregiver verbalize understanding of patient care needs?: Yes  Were patient/caregiver advised of the risks associated with not following Treatment Team discharge recommendations?: Yes    Contacts  Patient Contacts: son Margarita De La Paz  Relationship to Patient[de-identified] Family  Contact Method: Phone  Phone Number: [phone] or cell [phone]  Reason/Outcome: Continuity of [address]          Is the patient interested in [facility] at discharge?: Yes  V[address] Naman Balderas requested[de-identified] Kentrell Escobedo Name[de-identified] [facility] Provider[de-identified] PCP  Home Health Services Needed[de-identified] Heart Failure Management,Evaluate Functional Status and Safety,Gait/ADL Training,Strengthening/Theraputic Exercises to Improve Function  Homebound Criteria Met[de-identified] Requires the Assistance of Another Person for Safe Ambulation or to Leave the Home,Uses an Assist Device (i e  cane, walker, etc)  Supporting Clincal Findings[de-identified] Limited Endurance,Dyspnea with Exertion,Fatigues Easliy in [employer]    DME Referral Provided  Referral made for DME?: No    Other Referral/Resources/Interventions Provided:  Programs[de-identified] CHF    Would you like to participate in our [address] service program?  : No - Declined    Treatment Team Recommendation: Assisted Living  Discharge Destination Plan[de-identified] Assisted Living                                         Additional Comments: 79yo male from [facility] with CHF, diuresing with Lasix  Creatinine up today to 2 26   Plan [facility] when ready

## 2022-02-28 NOTE — OCCUPATIONAL THERAPY NOTE
Occupational Therapy Evaluation     Patient Name: Ivis JULES Date: 2/28/2022  Problem List  Principal Problem:    Acute on chronic HFrEF (heart failure with reduced ejection fraction) ([facility] )  Active Problems:    CKD (chronic kidney disease) stage 4, GFR 15-29 ml/min ([facility])    Coronary artery disease    Hypertension    Hypercholesterolemia    Bilateral calf pain    Past Medical History  Past Medical History:   Diagnosis Date    Chronic renal disease, stage III ([place] [address] )     Chronic renal disease, stage III ([facility] )     Essential hypertension, benign     Essential hypertension, benign      Past Surgical History  Past Surgical History:   Procedure Laterality Date    CORONARY ARTERY BYPASS GRAFT  2012    x4           02/28/22 0836   OT Last Visit   OT Visit Date 02/28/22   Note Type   Note type Evaluation   Restrictions/Precautions   Weight Bearing Precautions Per Order No   Other Precautions Cognitive; Chair Alarm;Multiple lines;Telemetry; Fall Risk   Pain Assessment   Pain Assessment Tool 0-10   Pain Score No Pain   Home Living   Type of Home Apartment;Assisted living   Home Layout One level;Performs ADLs on one level; Able to live on main level with bedroom/bathroom   Bathroom Shower/Tub Walk-in shower   Bathroom Toilet Standard   Bathroom Equipment Grab bars in [facility]   Additional Comments Pt lives in an assisted living facility and used a walker PTA  Prior Function   Level of [place] Independent with ADLs and functional mobility   Lives With Alone   Receives Help From Personal care attendant   ADL Assistance Independent   IADLs Needs assistance   Falls in the last 6 months 1 to 4  (Per EMR  Per pt, 0 )   Vocational Retired   Lifestyle   Autonomy Pt states that he was I in ADLs PTA  Pt used a walker PTA  Pt also stated that he was driving till recently  Fitness to drive if pt is still driving is recommended      Reciprocal Relationships Pt lives in an assisted living facility  Service to Others Pt is retired  Intrinsic Gratification Will follow up  Psychosocial   Psychosocial (WDL) WDL   ADL   Where Assessed Chair   Eating Assistance 5  Supervision/Setup   Grooming Assistance 4  Minimal Assistance   UB Bathing Assistance 4  Minimal Assistance   LB Bathing Assistance 3  Moderate Assistance   UB Dressing Assistance 4  Minimal Assistance   LB Dressing Assistance 3  Moderate [address]  4  Minimal Assistance   Functional Assistance 3  Moderate Assistance   Transfers   Sit to Stand 5  Supervision   Stand to Sit 5  Supervision   Additional Comments Pt used RW for tx  Pt was left in chair with all necessary items within reach  Functional Mobility   Functional Mobility 4  Minimal assistance   Additional Comments Pt was able to demonstrate household mobility with RW  Additional items Rolling walker   Balance   Static Sitting Good   Dynamic Sitting Fair +   Static Standing Fair +   Dynamic Standing Fair   Ambulatory Fair   Activity Tolerance   Activity Tolerance Patient tolerated treatment well;Patient limited by fatigue   Medical Staff Made Aware RN was made aware  Nurse Made Aware RN was made aware  Cognition   Overall Cognitive Status Impaired   Arousal/Participation Alert; Responsive;Arousable; Cooperative   Attention Attends with cues to redirect   Orientation Level Oriented to place;Oriented to situation;Oriented to person   Memory Decreased short term memory;Decreased recall of precautions;Decreased recall of recent events   Following Commands Follows one step commands with increased time or repetition   Comments Pt is a questionable historian  Pt was pleasant and cooperative t/o session  Pt was able to recall place, situation, year, and date  Pt had difficulty recalling day of week  Assessment   Limitation Decreased ADL status; Decreased cognition;Decreased Safe judgement during ADL;Decreased endurance;Decreased self-care trans;Decreased high-level ADLs   Prognosis Fair   Assessment Pt is 80 y o  male admitted to [facility] on 2022 w/ acute on chronic heart failure with reduced ejection fraction  Pt  has a past medical history of Chronic renal disease, stage III ([address] ), Chronic renal disease, stage III ([address] ), Essential hypertension, benign, and Essential hypertension, benign    Pt with active OT orders and activity orders  Pt resides in a Assisted Living, with alone  Pt was I w  ADLs, and receiving A with IADLs  Pt is a questionable poor historian  Pt is currently functioning at S for eating, transfers, min A for functional mobility and UB ADLs, and mod A for LB ADLs  Pt is limited 2* pain, endurance, activity tolerance, functional mobility, functional standing tolerance and decreased I w/ ADLS/IADLS  The Areas of Occupational Performance Areas to address w/ pt include: eating, grooming, bathing/shower, toilet hygiene, dressing, health maintenance and functional mobility  OT recommendation for d/c includes return to facility dependent on available support  Pt would benefit from continued acute OT services for  3-5x/week to  w/in 10-14 days:  to address functional goals  Goals   LTG Time Frame 10-14   Long Term Goal see goals below   Plan   Treatment Interventions ADL retraining;Functional transfer training; Endurance training;Cognitive reorientation; Compensatory technique education;Continued evaluation; Activityengagement; Energy conservation   Goal Expiration Date 22   OT Frequency 3-5x/wk   Recommendation   OT Discharge Recommendation Return to facility with rehabilitation services  (pending available support)   OT - OK to Discharge Yes  (when medically appropriate)   AM-PAC Daily Activity Inpatient   Lower Body Dressing 2   Bathing 3   Toileting 3   Upper Body Dressing 3   Grooming 3   Eating 4   Daily Activity Raw Score 18   Daily Activity Standardized Score (Calc for Raw Score >=11) 38 66   AM-PAC Applied Cognition Inpatient   Following a Speech/Presentation 3   Understanding Ordinary Conversation 4   Taking Medications 4   Remembering Where Things Are Placed or Put Away 3   Remembering List of 4-5 Errands 3   Taking Care of Complicated Tasks 3   Applied Cognition Raw Score 20   Applied Cognition Standardized Score 41 76   Modified [place] Scale   Modified [place] Scale 3        Goals  Pt w/ mod I will complete daily grooming tasks w/ adaptive equipment as needed  Pt will increase standing tolerance to 10 mins w/ mod I w/ adaptive equipment as needed  Pt will complete functional transfers w/ mod I on and off all surfaces w/ equipment as needed  Pt will complete functional mobility w/ mod I on and off all surfaces w/ equipment as needed  Pt will complete tolieting w/ mod I while demonstrating safety techniques w/ DME  Pt will complete feeding tasks w/ mod I using adaptive devices  Pt will demonstrate G safety awareness w/ mod I during OT session  Pt will demonstrate LB ADLs w/ mod I w/ adaptive equipment as needed  Pt will demonstrate UB ADLs w/ mod I w/ adaptive equipment as needed  Pt will increase activity tolerance to G during a 30 min OT tx session  Pt will demonstrate bed mobility skills w/ mod I  Pt will participate in a formal/functional cognitive assessment as needed to assist with safe discharge planning         RATNA Lo

## 2022-02-28 NOTE — CASE MANAGEMENT
Case Management Assessment & Discharge Planning Note    Patient name Koochiching Show  Location [facility] 409/[facility] 442-31 MRN [id number]  : 1926 Date 2022       Current Admission Date: 2022  Current Admission Diagnosis:Acute on chronic HFrEF (heart failure with reduced ejection fraction) [facility])   Patient Active Problem List    Diagnosis Date Noted    Bilateral calf pain 2022    Chronic renal disease, stage IV ([facility] ) 2021    Chronic kidney disease-mineral and bone disorder 2021    Acute on chronic HFrEF (heart failure with reduced ejection fraction) ([facility] ) 2021    CKD (chronic kidney disease) stage 4, GFR 15-29 ml/min ([facility] ) 2013    Coronary artery disease 2013    Hypertension 2013    Benign hypertensive CKD, stage 1-4 or unspecified chronic kidney disease 2013    Hypercholesterolemia 10/08/2012      LOS (days): 2  Geometric Mean LOS (GMLOS) (days): 3 80  Days to GMLOS:1 4     OBJECTIVE:    Risk of Unplanned Readmission Score: 13         Current admission status: Inpatient       Preferred Pharmacy:   [address] [place] [address], [address] [place] [address] Drive  [address] [place] [address] Road [address] [place] [address]  Phone: [phone] Fax: [phone]    Primary Care Provider: Tamara Villar DO    Primary Insurance: MEDICARE  Secondary Insurance: BLUE CROSS    ASSESSMENT:  [id number] [facility], [address] Representative - Son   Primary Phone: [phone] (Mobile)  Home Phone: [phone]               Advance Directives  Does patient have a [address]?: No (pt unsure)  Was patient offered paperwork?: No  Does patient currently have a Health Care decision maker?: Yes, please see Health Care Proxy section  Does patient have Advance Directives? :  (pt unsure)  Advance Directives: Living will,Power of  for health care  Primary Contact: whitney Cordero         Readmission Root Cause  30 Day Readmission: No    Patient Information  Admitted from[de-identified] Facility  Mental Status: Alert  During Assessment patient was accompanied by: Not accompanied during assessment  Assessment information provided by[de-identified] Patient  Primary Caregiver: Self  Support Systems: Son,Other (Comment) ([facility])  [place] of Residence: [address] do you live in?: [address] entry access options   Select all that apply : No steps to enter home  Type of Current Residence: Apartment,Facility [facility]isted living)  Floor Level: 1  Upon entering residence, is there a bedroom on the main floor (no further steps)?: Yes  Upon entering residence, is there a bathroom on the main floor (no further steps)?: Yes  In the last 12 months, was there a time when you were not able to pay the mortgage or rent on time?: No  In the last 12 months, was there a time when you did not have a steady place to sleep or slept in a shelter (including now)?: No  Homeless/housing insecurity resource given?: Refused  Living Arrangements: Lives Alone  Is patient a ?:  (unknown)    Activities of Daily Living Prior to Admission  Functional Status: Assistance  Completes ADLs independently?: Yes  Ambulates independently?: Yes  Does patient use assisted devices?: Yes  Assisted Devices (DME) used: Vasile Wilkinson  Does patient currently own DME?: Yes  What DME does the patient currently own?: Vasile Wilkinson  Does patient have a history of Outpatient Therapy (PT/OT)?: No  Does the patient have a history of Short-Term Rehab?: No  Does patient have a history of HHC?: No  [facility]: No         Patient Information Continued  Income Source: Pension/custodial  Does patient have prescription coverage?: Yes  Within the past 12 months, you worried that your food would run out before you got the money to buy more : Never true  Within the past 12 months, the food you bought just didnt last and you didnt have money to get more : Never true  Food insecurity resource given?: N/A  Does patient receive dialysis treatments?: No  Does patient have a history of substance abuse?: No  Does patient have a history of Mental Health Diagnosis?: No         Means of Transportation  [facility] Other (Comment) ([address] [place] [address] Assisted Living)        DISCHARGE DETAILS:    Discharge planning discussed with[de-identified] pt--return to [address] [place] [address] Assisted Living when ready  Freedom of Choice: Yes        Were Treatment Team discharge recommendations reviewed with patient/caregiver?: Yes  Did patient/caregiver verbalize understanding of patient care needs?: Yes  Were patient/caregiver advised of the risks associated with not following Treatment Team discharge recommendations?: Yes    Contacts  Patient Contacts: son Thom Paul  Relationship to Patient[de-identified] Family  Contact Method: Phone  Phone Number: [phone] or cell [phone]  Reason/Outcome: Continuity of [address]         DME Referral Provided  Referral made for DME?: No         Would you like to participate in our [address] service program?  : No - Declined    Treatment Team Recommendation: Assisted Living  Discharge Destination Plan[de-identified] Assisted Living                                         Additional Comments: 81yo male from [address] with CHF, diuresing with Lasix  Creatinine up today to 2 26   Plan return to [address] [place] [address] when ready

## 2022-02-28 NOTE — PROGRESS NOTES
[address] [facility]  Progress Note - Cori Mckoy 12/6/1926, 80 y o  male MRN: [id number]  Unit/Bed#: [facility] 409-01 Encounter: [id number]  Primary Care Provider: Carlos Webber DO   Date and time admitted to hospital: 2/26/2022  2:59 AM    * Acute on chronic HFrEF (heart failure with reduced ejection fraction) ([facility] [address] )  Assessment & Plan  Wt Readings from Last 3 Encounters:   02/28/22 57 1 kg (125 lb 14 1 oz)   12/08/21 59 9 kg (132 lb)   08/26/21 58 kg (127 lb 12 8 oz)     · One month history of increasing dyspnea presenting with worsening dyspnea and chest tightness tonight  · CXR with evidence of pulmonary edema additionally noted with elevated NT-BNP pro concerning for acute on chronic heart failure  · Patient is otherwise hemodynamically stable, maintaining appropriate saturations on room air  · Diuresed 3L w/ IV Lasix and transitioned to increased dose of home Lasix  · updated echocardiogram showed EF 25% which is more or less unchanged from prior echocardiogram showing EF 25-30%  · Cardiology appreciated  · Low-sodium, fluid-restricted diet  · Monitor daily weights, I/O, volume status, electrolytes/renal function        PVC's (premature ventricular contractions)  Assessment & Plan  · Although tele c/d Afib, EKG confirms NSR w/ PACs and PVCs  · C/w BB  · Keep K at 4  · Mag > 2    Bilateral calf pain  Assessment & Plan  · LEVDs show no DVT    Hypercholesterolemia  Assessment & Plan  · Continue statin therapy    Hypertension  Assessment & Plan  · Continue home antihypertensives with strict hold parameters  · Monitor blood pressure per protocol    Coronary artery disease  Assessment & Plan  · S/p prior CABG  · Continue ASA, Plavix, beta-blocker, statin  · HS-trops < 50 and delta < 20  · Echo unchanged    CKD (chronic kidney disease) stage 4, GFR 15-29 ml/min [facility])  Assessment & Plan  Lab Results   Component Value Date    EGFR 23 02/28/2022    EGFR 24 02/27/2022    EGFR 26 02/26/2022 CREATININE 2 26 (H) 2022    CREATININE 2 18 (H) 2022    CREATININE 2 07 (H) 2022   · Estimated Creatinine Clearance: 15 8 mL/min (A) (by C-G formula based on SCr of 2 26 mg/dL (H))  · Creatinine at baseline 2 1-2 2  · Recheck Cr tomorrow for stability  OK to d/c tomorrow as long as Cr not increasing  VTE Pharmacologic Prophylaxis: VTE Score: 4 Moderate Risk (Score 3-4) - Pharmacological DVT Prophylaxis Ordered: heparin  Patient Centered Rounds: I performed bedside rounds with nursing staff today  Discussions with Specialists or Other Care Team Provider: no    Education and Discussions with Family / Patient: Updated  (son) at bedside  Time Spent for Care: 30 minutes  More than 50% of total time spent on counseling and coordination of care as described above  Current Length of Stay: 2 day(s)  Current Patient Status: Inpatient   Certification Statement: The patient will continue to require additional inpatient hospital stay due to need to monitor Cr  Discharge Plan: Anticipate discharge tomorrow to prior assisted or independent living facility  Code Status: Level 3 - DNAR and DNI    Subjective:   No acute complaints    Objective:     Vitals:   Temp (24hrs), Av 6 °F (36 4 °C), Min:97 4 °F (36 3 °C), Max:98 °F (36 7 °C)    Temp:  [97 4 °F (36 3 °C)-98 °F (36 7 °C)] 97 4 °F (36 3 °C)  HR:  [63-70] 64  Resp:  [14-16] 16  BP: (102-123)/(57-76) 111/57  SpO2:  [94 %-100 %] 96 %  Body mass index is 20 95 kg/m²  Input and Output Summary (last 24 hours): Intake/Output Summary (Last 24 hours) at 2022 1607  Last data filed at 2022 1100  Gross per 24 hour   Intake 960 ml   Output 1825 ml   Net -865 ml       Physical Exam:   Physical Exam  HENT:      Head: Normocephalic and atraumatic  Nose: Nose normal       Mouth/Throat:      Mouth: Mucous membranes are moist    Eyes:      Extraocular Movements: Extraocular movements intact        Conjunctiva/sclera: Conjunctivae normal    Cardiovascular:      Rate and Rhythm: Normal rate and regular rhythm  Pulmonary:      Effort: Pulmonary effort is normal       Breath sounds: Normal breath sounds  No wheezing or rales  Abdominal:      General: Bowel sounds are normal  There is no distension  Palpations: Abdomen is soft  Tenderness: There is no abdominal tenderness  Musculoskeletal:         General: Normal range of motion  Cervical back: Normal range of motion and neck supple  Right lower leg: No edema  Left lower leg: No edema  Skin:     General: Skin is warm and dry  Neurological:      Mental Status: He is alert and oriented to person, place, and time  Additional Data:     Labs:  Results from last 7 days   Lab Units 02/27/22  0151 02/26/22  1003 02/26/22  0402   WBC Thousand/uL 10 65*  --  12 89*   HEMOGLOBIN g/dL 12 4  --  12 4   HEMATOCRIT % 37 5  --  38 4   PLATELETS Thousands/uL 150   < > 152   NEUTROS PCT %  --   --  82*   LYMPHS PCT %  --   --  6*   MONOS PCT %  --   --  6   EOS PCT %  --   --  4    < > = values in this interval not displayed  Results from last 7 days   Lab Units 02/28/22  0501 02/27/22  0151 02/27/22  0151   SODIUM mmol/L 135*   < > 131*   POTASSIUM mmol/L 3 9   < > 4 2   CHLORIDE mmol/L 104   < > 102   CO2 mmol/L 23   < > 22   BUN mg/dL 44*   < > 44*   CREATININE mg/dL 2 26*   < > 2 18*   ANION GAP mmol/L 8   < > 7   CALCIUM mg/dL 8 5   < > 8 9   ALBUMIN g/dL  --   --  3 2*   TOTAL BILIRUBIN mg/dL  --   --  0 66   ALK PHOS U/L  --   --  90   ALT U/L  --   --  75   AST U/L  --   --  35   GLUCOSE RANDOM mg/dL 81   < > 105    < > = values in this interval not displayed                         Lines/Drains:  Invasive Devices  Report    Peripheral Intravenous Line            Peripheral IV 02/26/22 Right Antecubital 2 days                  Telemetry:  Telemetry Orders (From admission, onward)             48 Hour Telemetry Monitoring  Continuous x 48 hours References:    Telemetry Guidelines   Question:  Reason for 48 Hour Telemetry  Answer:  Arrhythmias Requiring Medical Therapy (eg  SVT, Vtach/fib, Bradycardia, Uncontrolled A-fib)                 Telemetry Reviewed: PVCs  Indication for Continued Telemetry Use: Acute CHF on >200 mg lasix/day or equivalent dose or with new reduced EF  Imaging: Reviewed radiology reports from this admission including: ultrasound(s)    Recent Cultures (last 7 days):         Last 24 Hours Medication List:   Current Facility-Administered Medications   Medication Dose Route Frequency Provider Last Rate    acetaminophen  975 mg Oral [facility] Angelic Donato DO      aspirin  81 mg Oral Daily Metta Kisha, DO      clopidogrel  75 mg Oral Daily Metta Kisha, DO      furosemide  20 mg Oral BID (diuretic) Lucia Montoya DO      heparin (porcine)  5,000 Units Subcutaneous Q8H [address] Metta Kisha, DO      menthol-methyl salicylate   Apply externally 4x Daily PRN BULMARO Adamson      metoprolol tartrate  50 mg Oral BID Metta Kisha, DO      pravastatin  20 mg Oral Daily Metta Kisha, DO          Today, Patient Was Seen By: Angelic Donato DO    **Please Note: This note may have been constructed using a voice recognition system  **

## 2022-02-28 NOTE — PROGRESS NOTES
Cardiology Progress Note - Team [address] [facility] [address] 48 y o  male MRN: [id number]  Unit/Bed#: [facility] 409-01 Encounter: [id number]      Assessment/Plan:  1  Acute on chronic HFrEF  - patient is euvolemic on exam  - NT proBNP 17,709  - CXR 02/26/2022 - bilateral mid and lower lung zone infiltrate, central vascular congestion and small effusions suggestive for pulmonary edema  - echo 02/26/2022 - LVEF 01%, systolic function severely reduced, no R WMA, severe global hypokinesis, diastolic function abnormal, mild to moderate AR, moderate MR, mild-to-moderate TR with estimated PA pressure of 45 mmHg, moderately sized left pleural effusion  - a m telemetry review - NSR, HR 70s, frequent PACs  - home diuretic regimen - Lasix 20 mg p o  Daily; now resumed at b i d  Dosing  - weight on admission 133 lb via standing scale > 125 lb this morning via standing scale  - currently net -560 mL/24 hours - net -4 L since admission s/p IV diuresis  - continue p o  Lasix, Lopressor 50 mg p o  B i d   - daily weights, salt/fluid restriction, strict I&Os    2  ICM with LVEF 25%  - EF as noted above  - discussed in length about overall diagnosis at last outpatient cardiology visit on 12/08/2021- management at his advanced age would strictly be medical for which patient/family agreeable  - continue beta-blocker, p o  diuretic  - no ACE/ARB secondary to elevated kidney function    3  CAD s/p CABGx4  - bypass in 2012  - continue aspirin 81 mg p o  Daily, Plavix 75 mg p o  Daily, Lopressor, pravastatin 20 mg p o  Daily    4  CKD stage 3  - creatinine on arrival 2 05 > 2 26 this morning; appears to be around baseline  - home valsartan on hold - resume prior to discharge  - avoid nephrotoxic agents  - continue to monitor    5  Hypertension  - last documented blood pressure 102/63; stable  - on home valsartan 40 mg p o  Daily - currently on hold secondary to #4 and softer blood pressures  - continue Lopressor     6   Mixed valvular disease  - echo as noted above  - medical management only    7  Hyperlipidemia  - lipid panel 02/26/2022 - / triglycerides 67/ HDL 51/ LDL 86  - continue pravastatin    Subjective:   Patient seen and examined  No significant events overnight  Patient denies any further shortness of breath  States he is able to lay flat at night without any complaints of orthopnea  Son, who is at bedside, expressed his concern with his kidney function with the increased oral diuretic dosage  States that he would like his father to stay for 1 more day in order to assess his a m  Labs  Objective:   Vitals: Blood pressure 102/63, pulse 70, temperature 97 6 °F (36 4 °C), temperature source Oral, resp  rate 16, height 5' 5" (1 651 m), weight 57 1 kg (125 lb 14 1 oz), SpO2 95 %  , Body mass index is 20 95 kg/m² ,   Orthostatic Blood Pressures      Most Recent Value   Blood Pressure 102/63 filed at 02/28/2022 1026   Patient Position - Orthostatic VS Sitting filed at 02/28/2022 0900          Intake/Output Summary (Last 24 hours) at 2/28/2022 1033  Last data filed at 2/28/2022 0851  Gross per 24 hour   Intake 1140 ml   Output 1700 ml   Net -560 ml     Physical Exam:  GEN: Favian Barone appears well, alert and oriented x 3, pleasant and cooperative   HEENT: pupils equal, round, and reactive to light; extraocular muscles intact  NECK: supple, no carotid bruits   HEART: regular rhythm, normal S1 and S2, murmur, clicks, gallops or rubs   LUNGS: clear to auscultation bilaterally; no wheezes, rales, or rhonchi   ABDOMEN: normal bowel sounds, soft, no tenderness, no distention  EXTREMITIES: peripheral pulses normal; no clubbing, cyanosis, or edema, dry/flaky skin bilateral lower extremities    Medications:    Current Facility-Administered Medications:     acetaminophen (TYLENOL) tablet 975 mg, 975 mg, Oral, Q8H [facility], Brandon Bowles DO, 975 mg at 02/28/22 1810    aspirin (ECOTRIN LOW STRENGTH) EC tablet 81 mg, 81 mg, Oral, Daily, Holli Lima DO, 81 mg at 02/28/22 0920    clopidogrel (PLAVIX) tablet 75 mg, 75 mg, Oral, Daily, Jemima Fuentes, DO, 75 mg at 02/28/22 0920    furosemide (LASIX) tablet 20 mg, 20 mg, Oral, BID (diuretic), Ny Bradford, DO, 20 mg at 02/28/22 0920    heparin (porcine) subcutaneous injection 5,000 Units, 5,000 Units, Subcutaneous, Q8H [address], 5,000 Units at 02/28/22 0622 **AND** [COMPLETED] Platelet count, , , Once, Jemima Fuentes DO    menthol-methyl salicylate (BENGAY) 29-23 % cream, , Apply externally, 4x Daily PRN, Mayuri Preston PA-C, Given at 02/27/22 0032    metoprolol tartrate (LOPRESSOR) tablet 50 mg, 50 mg, Oral, BID, Jemima Fuentes, , 50 mg at 02/28/22 1026    pravastatin (PRAVACHOL) tablet 20 mg, 20 mg, Oral, Daily, Jemima Fuentes, DO, 20 mg at 02/28/22 0920     Labs & Results:      Results from last 7 days   Lab Units 02/27/22  0151 02/26/22  1003 02/26/22  0402   WBC Thousand/uL 10 65*  --  12 89*   HEMOGLOBIN g/dL 12 4  --  12 4   HEMATOCRIT % 37 5  --  38 4   PLATELETS Thousands/uL 150 151 152     Results from last 7 days   Lab Units 02/26/22  0639   TRIGLYCERIDES mg/dL 67   HDL mg/dL 51     Results from last 7 days   Lab Units 02/28/22  0501 02/27/22  0151 02/26/22  1003 02/26/22  0402 02/26/22  0402   POTASSIUM mmol/L 3 9 4 2 4 2   < > 4 5   CHLORIDE mmol/L 104 102 110*   < > 109*   CO2 mmol/L 23 22 21   < > 22   BUN mg/dL 44* 44* 41*   < > 48*   CREATININE mg/dL 2 26* 2 18* 2 07*   < > 2 05*   CALCIUM mg/dL 8 5 8 9 9 0   < > 9 0   ALK PHOS U/L  --  90  --   --  102   ALT U/L  --  75  --   --  100*   AST U/L  --  35  --   --  68*    < > = values in this interval not displayed           Results from last 7 days   Lab Units 02/27/22  0151 02/26/22  0402   MAGNESIUM mg/dL 2 3 2 5

## 2022-02-28 NOTE — PHYSICAL THERAPY NOTE
PHYSICAL THERAPY NOTE          Patient Name: Petra Snyder  [id number] Date: 2/28/2022 02/28/22 1359   PT Last Visit   PT Visit Date 02/28/22   Note Type   Note Type Treatment   Pain Assessment   Pain Assessment Tool 0-10   Pain Score No Pain   Restrictions/Precautions   Other Precautions Cognitive; Chair Alarm  (chair alarm on at the end of session)   General   Chart Reviewed Yes   Additional Pertinent History cleared for Tx session   Response to Previous Treatment Patient with no complaints from previous session  Cognition   Overall Cognitive Status Impaired   Arousal/Participation Alert; Cooperative   Attention Attends with cues to redirect   Orientation Level Oriented to person;Oriented to place;Oriented to situation   Memory Decreased recall of recent events   Following Commands Follows one step commands without difficulty   Subjective   Subjective Alert; observed in the BR -> ready to transiton to the chair and then agreeable to perform therex and mobilize   Transfers   Sit to Stand 6  Modified independent   Stand to Sit 6  Modified independent   Ambulation/Elevation   Gait pattern Excessively slow; Short stride; Foward flexed  (no overt LOB, knee buckling or excessive swaying)   Gait Assistance 5  Supervision   Additional items Verbal cues   Assistive Device Rolling walker   Distance 120 ft + 150 ft + 150 ft + 120 ft w/ extended seated rest periods in between;  (initial 8 ft from the BR to chair)   Balance   Static Sitting Good   Dynamic Sitting Fair +   Static Standing Fair +   Dynamic Standing Fair   Ambulatory Fair -   Activity Tolerance   Activity Tolerance Patient tolerated treatment well   Nurse Made Aware spoke to JONAH Bradley   Exercises   Knee AROM Long Arc Quad Sitting;15 reps;AROM; Bilateral   Ankle Pumps Sitting;15 reps;AROM; Bilateral   Marching Sitting;10 reps;AROM; Bilateral   Assessment   Prognosis Good   Problem List Decreased strength;Decreased endurance; Impaired balance;Decreased mobility   Assessment Pt demonstrated overall improvement in balance, endurance and all aspects of observed mobility progressing to mod (I) level w/ transfers and (S) w/ amb w/ rw; no overt uncorrected LOB, gross knee buckling, or swaying observed and overall mobility status appears to be at or near premorbid level; based on above, anticipate pt will return back to [facility] upon D/C when medically cleared; home PT follow up is recommended; will follow  Goals   Patient Goals to return home   STG Expiration Date 03/13/22   PT Treatment Day 1   Plan   Treatment/Interventions Functional transfer training;LE strengthening/ROM; Therapeutic exercise; Endurance training;Bed mobility; Compensatory technique education;Spoke to nursing;Spoke to case management   Progress Improving as expected   PT Frequency 3-5x/wk   Recommendation   PT Discharge Recommendation Home with home health rehabilitation  (home PT)   Equipment Recommended Walker  (cont using it)   Adonis 74 walker   Chevy French 435   Turning in Bed Without Bedrails 4   Lying on Back to Sitting on Edge of Flat Bed 4   Moving Bed to Chair 3   Standing Up From Chair 3   Walk in Room 3   Climb 3-5 Stairs 2   Basic Mobility Inpatient Raw Score 19   Basic Mobility Standardized Score 42 48   Highest Level Of Mobility   JH-HLM Goal 6: Walk 10 steps or more   JH-HLM Highest Level of Mobility 8: Walk 250 feet ot more   JH-HLM Goal Achieved Yes   Education   Education Provided Mobility training;Home exercise program;Assistive device   Patient Demonstrates verbal understanding   End of Consult   Patient Position at End of Consult Bedside chair;Bed/Chair alarm activated; All needs within reach       Luna John, PT

## 2022-02-28 NOTE — ASSESSMENT & PLAN NOTE
Wt Readings from Last 3 Encounters:   02/28/22 57 1 kg (125 lb 14 1 oz)   12/08/21 59 9 kg (132 lb)   08/26/21 58 kg (127 lb 12 8 oz)     · One month history of increasing dyspnea presenting with worsening dyspnea and chest tightness tonight  · CXR with evidence of pulmonary edema additionally noted with elevated NT-BNP pro concerning for acute on chronic heart failure  · Patient is otherwise hemodynamically stable, maintaining appropriate saturations on room air  · Diuresed 3L w/ IV Lasix and transitioned to increased dose of home Lasix  · updated echocardiogram showed EF 25% which is more or less unchanged from prior echocardiogram showing EF 25-30%  · Cardiology appreciated  · Low-sodium, fluid-restricted diet  · Monitor daily weights, I/O, volume status, electrolytes/renal function

## 2022-03-01 VITALS
OXYGEN SATURATION: 94 % | DIASTOLIC BLOOD PRESSURE: 61 MMHG | RESPIRATION RATE: 20 BRPM | SYSTOLIC BLOOD PRESSURE: 123 MMHG | WEIGHT: 128.09 LBS | HEIGHT: 65 IN | BODY MASS INDEX: 21.34 KG/M2 | TEMPERATURE: 97.9 F | HEART RATE: 65 BPM

## 2022-03-01 LAB
ANION GAP SERPL CALCULATED.3IONS-SCNC: 6 MMOL/L (ref 4–13)
BUN SERPL-MCNC: 42 MG/DL (ref 5–25)
CALCIUM SERPL-MCNC: 8.7 MG/DL (ref 8.3–10.1)
CHLORIDE SERPL-SCNC: 101 MMOL/L (ref 100–108)
CO2 SERPL-SCNC: 27 MMOL/L (ref 21–32)
CREAT SERPL-MCNC: 2.25 MG/DL (ref 0.6–1.3)
FLUAV RNA RESP QL NAA+PROBE: NEGATIVE
FLUBV RNA RESP QL NAA+PROBE: NEGATIVE
GFR SERPL CREATININE-BSD FRML MDRD: 23 ML/MIN/1.73SQ M
GLUCOSE SERPL-MCNC: 86 MG/DL (ref 65–140)
POTASSIUM SERPL-SCNC: 4.8 MMOL/L (ref 3.5–5.3)
RSV RNA RESP QL NAA+PROBE: NEGATIVE
SARS-COV-2 RNA RESP QL NAA+PROBE: NEGATIVE
SODIUM SERPL-SCNC: 134 MMOL/L (ref 136–145)

## 2022-03-01 PROCEDURE — 0241U HB NFCT DS VIR RESP RNA 4 TRGT: CPT | Performed by: INTERNAL MEDICINE

## 2022-03-01 PROCEDURE — 80048 BASIC METABOLIC PNL TOTAL CA: CPT | Performed by: GENERAL PRACTICE

## 2022-03-01 PROCEDURE — 99232 SBSQ HOSP IP/OBS MODERATE 35: CPT | Performed by: INTERNAL MEDICINE

## 2022-03-01 PROCEDURE — 99238 HOSP IP/OBS DSCHRG MGMT 30/<: CPT | Performed by: INTERNAL MEDICINE

## 2022-03-01 RX ORDER — FUROSEMIDE 20 MG/1
20 TABLET ORAL 2 TIMES DAILY
Qty: 90 TABLET | Refills: 0 | Status: SHIPPED | OUTPATIENT
Start: 2022-03-01 | End: 2022-03-10

## 2022-03-01 RX ADMIN — HEPARIN SODIUM 5000 UNITS: 5000 INJECTION INTRAVENOUS; SUBCUTANEOUS at 05:16

## 2022-03-01 RX ADMIN — ACETAMINOPHEN 975 MG: 325 TABLET ORAL at 05:16

## 2022-03-01 RX ADMIN — FUROSEMIDE 20 MG: 20 TABLET ORAL at 09:03

## 2022-03-01 RX ADMIN — METOPROLOL TARTRATE 50 MG: 50 TABLET, FILM COATED ORAL at 09:04

## 2022-03-01 RX ADMIN — PRAVASTATIN SODIUM 20 MG: 20 TABLET ORAL at 09:03

## 2022-03-01 RX ADMIN — ASPIRIN 81 MG: 81 TABLET, COATED ORAL at 09:03

## 2022-03-01 RX ADMIN — CLOPIDOGREL BISULFATE 75 MG: 75 TABLET ORAL at 09:04

## 2022-03-01 NOTE — DISCHARGE INSTRUCTIONS
Take your medications as directed, and keep your follow up appointments  Adhere to a heart healthy lifestyle, maintaining a low sodium diet  Daily weight and record  If your weight increases 2-3 lbs in one day, or 5 lbs in 2 days, you are short of breath or have lower extremity swelling, please call Nurse Laureen Spatz at  [facility] Cardiology at [phone]

## 2022-03-01 NOTE — PROGRESS NOTES
General Cardiology   Progress Note -  Team One   Tarun Ferguson 80 y o  male MRN: [id number]    Unit/Bed#: [facility] 409-01 Encounter: [id number]    Assessment/ Plan:    1  Acute on chronic HFrEF  - patient is euvolemic on exam  - NT proBNP 17,709  - CXR 02/26/2022 - bilateral mid and lower lung zone infiltrate, central vascular congestion and small effusions suggestive for pulmonary edema  - echo 02/26/2022 - LVEF 25%, systolic function severely reduced, no R WMA, severe global hypokinesis, diastolic function abnormal, mild to moderate AR, moderate MR, mild-to-moderate TR with estimated PA pressure of 45 mmHg, moderately sized left pleural effusion    He is status post intravenous Lasix with improvement and now on Lasix 20 mg p o  B i d , prior to admission diuretics were Lasix 20 mg daily  Standing scale weight is 128 lb today  Overall -6 L  Creatinine stable at 2 25    Okay to discharge home from cardiac standpoint today  Close Cardiology follow-up arranged for 3/8/22  Reviewed low-sodium diet, fluid restrictions and daily weights with patient        2  ICM with LVEF 25%  - EF as noted above; her outpatient cardiology notes medical management advised given his advanced age  Continue beta-blocker and oral diuretics as above; holding valsartan fluctuating creatinine with diuresis  Would resume at outpatient follow-up as long as creatinine remains stable       3  CAD s/p CABGx4  - bypass in 2012  - continue aspirin 81 mg p o  Daily, Plavix 75 mg p o  Daily, Lopressor, pravastatin 20 mg p o  Daily; no symptoms to suggest angina     4  CKD stage 3:  Creatinine stable at 2 25 today from 2 26 yesterday; likely his new baseline  Primary team ordered a repeat BNP for 5 days post discharge     5  Hypertension  - last documented blood pressure 114/63; no hypotension; stable  Valsartan has been held  6  Mixed valvular disease  - echo as noted above  - medical management only     7   Hyperlipidemia  - lipid panel 02/26/2022 - / triglycerides 67/ HDL 51/ LDL 86  - continue pravastatin       Subjective:  Patient seen for follow-up  No events overnight  He reports feeling well today  He is not having any chest discomfort, palpitations dizziness lightheadedness or shortness of breath  He is being discharged home today  Review of Systems   Constitutional: Negative for decreased appetite and fever  Cardiovascular: Negative for chest pain, dyspnea on exertion, leg swelling and palpitations  Respiratory: Negative for cough and shortness of breath  Gastrointestinal: Negative for nausea and vomiting  Genitourinary: Negative for dysuria  Neurological: Negative for dizziness and light-headedness  Psychiatric/Behavioral: Negative for altered mental status  All other systems reviewed and are negative  Objective:   Vitals: Blood pressure 145/65, pulse 64, temperature 97 6 °F (36 4 °C), temperature source Oral, resp  rate 20, height 5' 5" (1 651 m), weight 58 1 kg (128 lb 1 4 oz), SpO2 95 %  ,       Body mass index is 21 31 kg/m²  ,     Systolic (84HVA), NNX:918 , Min:104 , VAU:636     Diastolic (95EGG), SVX:87, Min:57, Max:73      Intake/Output Summary (Last 24 hours) at 3/1/2022 1057  Last data filed at 3/1/2022 0452  Gross per 24 hour   Intake --   Output 2175 ml   Net -2175 ml     Weight (last 2 days)     Date/Time Weight    03/01/22 0542 58 1 (128 09)    02/28/22 0600 57 1 (125 88)    02/27/22 0549 57 7 (127 21)        Telemetry Review:   No telemetry    Physical Exam  Vitals reviewed  Constitutional:       Appearance: Normal appearance  Comments: Patient is sitting up in chair in NAD alert pleasant cooperative   HENT:      Head: Normocephalic  Mouth/Throat:      Mouth: Mucous membranes are moist    Cardiovascular:      Rate and Rhythm: Normal rate and regular rhythm  Heart sounds: S1 normal and S2 normal  No murmur heard        Pulmonary:      Effort: Pulmonary effort is normal  Breath sounds: Normal breath sounds  No wheezing or rales  Comments: Lung sounds clear to auscultation, on room air  Abdominal:      Palpations: Abdomen is soft  Musculoskeletal:      Right lower leg: No edema  Left lower leg: No edema  Skin:     General: Skin is warm  Neurological:      Mental Status: He is alert and oriented to person, place, and time     Psychiatric:         Mood and Affect: Mood normal        LABORATORY RESULTS      CBC with diff:   Results from last 7 days   Lab Units 02/27/22  0151 02/26/22  1003 02/26/22  0402   WBC Thousand/uL 10 65*  --  12 89*   HEMOGLOBIN g/dL 12 4  --  12 4   HEMATOCRIT % 37 5  --  38 4   MCV fL 98  --  100*   PLATELETS Thousands/uL 150 151 152   MCH pg 32 5  --  32 3   MCHC g/dL 33 1  --  32 3   RDW % 14 7  --  14 9   MPV fL 11 5 11 3 11 5   NRBC AUTO /100 WBCs  --   --  0       CMP:  Results from last 7 days   Lab Units 03/01/22  0451 02/28/22  0501 02/27/22  0151 02/26/22  1003 02/26/22  0402   POTASSIUM mmol/L 4 8 3 9 4 2 4 2 4 5   CHLORIDE mmol/L 101 104 102 110* 109*   CO2 mmol/L 27 23 22 21 22   BUN mg/dL 42* 44* 44* 41* 48*   CREATININE mg/dL 2 25* 2 26* 2 18* 2 07* 2 05*   CALCIUM mg/dL 8 7 8 5 8 9 9 0 9 0   AST U/L  --   --  35  --  68*   ALT U/L  --   --  75  --  100*   ALK PHOS U/L  --   --  90  --  102   EGFR ml/min/1 73sq m 23 23 24 26 26       BMP:  Results from last 7 days   Lab Units 03/01/22  0451 02/28/22  0501 02/27/22  0151 02/26/22  1003 02/26/22  0402   POTASSIUM mmol/L 4 8 3 9 4 2 4 2 4 5   CHLORIDE mmol/L 101 104 102 110* 109*   CO2 mmol/L 27 23 22 21 22   BUN mg/dL 42* 44* 44* 41* 48*   CREATININE mg/dL 2 25* 2 26* 2 18* 2 07* 2 05*   CALCIUM mg/dL 8 7 8 5 8 9 9 0 9 0       Lab Results   Component Value Date    [facility] 17,709 (H) 02/26/2022             Results from last 7 days   Lab Units 02/27/22  0151 02/26/22  0402   MAGNESIUM mg/dL 2 3 2 5                           Lipid Profile:   No results found for: CHOL  Lab Results Component Value Date    HDL 51 02/26/2022     Lab Results   Component Value Date    LDLCALC 86 02/26/2022     Lab Results   Component Value Date    TRIG 67 02/26/2022     Meds/Allergies   current meds:   Current Facility-Administered Medications   Medication Dose Route Frequency    acetaminophen (TYLENOL) tablet 975 mg  975 mg Oral Q8H [address]    aspirin (ECOTRIN LOW STRENGTH) EC tablet 81 mg  81 mg Oral Daily    clopidogrel (PLAVIX) tablet 75 mg  75 mg Oral Daily    furosemide (LASIX) tablet 20 mg  20 mg Oral BID (diuretic)    heparin (porcine) subcutaneous injection 5,000 Units  5,000 Units Subcutaneous Q8H [address]    menthol-methyl salicylate (BENGAY) 16-00 % cream   Apply externally 4x Daily PRN    metoprolol tartrate (LOPRESSOR) tablet 50 mg  50 mg Oral BID    pravastatin (PRAVACHOL) tablet 20 mg  20 mg Oral Daily     Medications Prior to Admission   Medication    aspirin (ASPIRIN LOW DOSE) 81 mg EC tablet    Biotin (RA Biotin) 2 5 MG CAPS    calcium carbonate (OS-VIRGEN) 1250 (500 Ca) MG tablet    cholecalciferol (VITAMIN D3) 1,000 units tablet    clopidogrel (Plavix) 75 mg tablet    metoprolol tartrate (LOPRESSOR) 50 mg tablet    Multiple Vitamin (multivitamin) capsule    pravastatin (PRAVACHOL) 20 mg tablet    valsartan (DIOVAN) 40 mg tablet    [DISCONTINUED] furosemide (LASIX) 20 mg tablet     Assessment:  Principal Problem:    Acute on chronic HFrEF (heart failure with reduced ejection fraction) ([facility])  Active Problems:    CKD (chronic kidney disease) stage 4, GFR 15-29 ml/min ([facility])    Coronary artery disease    Hypertension    Hypercholesterolemia    PVC's (premature ventricular contractions)    Counseling / Coordination of Care  Total floor / unit time spent today 20 minutes  Greater than 50% of total time was spent with the patient and / or family counseling and / or coordination of care        ** Please Note: Dragon 360 Dictation voice to text software may have been used in the creation of this document   **

## 2022-03-01 NOTE — DISCHARGE SUMMARY
Discharging Physician / Practitioner: Kirill Cardozo MD  PCP: Odilia Perez DO  Admission Date:   Admission Orders (From admission, onward)     Ordered        02/26/22 0514  Inpatient Admission  Once                      Discharge Date: 03/01/22     Principal discharge diagnosis:  Acute on chronic heart failure with reduced ejection fraction    Secondary diagnoses:  1  Ischemic cardiomyopathy  2  Coronary artery disease  3  CKD-4  4  Hypertension  5  Mixed valvular heart disease  6  Hyperlipidemia    Consultations During Hospital Stay:  Cardiology    Procedures Performed:   1  Chest Xray - Bilateral mid and lower lung zone infiltrate, central vascular congestion and small effusions  Correlate with clinical findings, suggesting pulmonary edema  2  Lower extremity venous doppler - No evidence of acute or chronic deep vein thrombosis  No evidence of superficial thrombophlebitis noted  Outpatient tests needed:  BMP in 5 days      Hospital Course:   Marck Chase is a 80 y o  male patient who originally presented to the hospital on 2/26/2022 due to shortness of breath  He was found to be acute on chronic systolic heart failure and improved clinically with intravenous Lasix  He is being discharged on Lasix 20 mg twice daily  He has CKD-4 with a baseline creatinine of 2 to 2  14  Creatinine had increased to 2 26 on 2/28/22 and was stable at 2 25 on discharge on 3/1/22  His Valsartan has been held  He has been advised to recheck creatinine in 5 days  He will follow-up with cardiology as an outpatient         Condition at Discharge: stable    Discharge Day Visit / Exam:   Subjective:    Vitals: Blood Pressure: 145/65 (03/01/22 0714)  Pulse: 64 (03/01/22 0714)  Temperature: 97 6 °F (36 4 °C) (03/01/22 0714)  Temp Source: Oral (03/01/22 0714)  Respirations: 20 (03/01/22 0714)  Height: 5' 5" (165 1 cm) (02/26/22 1430)  Weight - Scale: 58 1 kg (128 lb 1 4 oz) (03/01/22 0542)  SpO2: 95 % (03/01/22 0714)  Exam: Physical Exam  Vitals reviewed  HENT:      Head: Normocephalic  Nose: Nose normal       Mouth/Throat:      Mouth: Mucous membranes are moist    Eyes:      Extraocular Movements: Extraocular movements intact  Cardiovascular:      Rate and Rhythm: Normal rate and regular rhythm  Pulmonary:      Effort: Pulmonary effort is normal       Breath sounds: Normal breath sounds  Abdominal:      General: Bowel sounds are normal  There is no distension  Palpations: Abdomen is soft  Tenderness: There is no abdominal tenderness  Musculoskeletal:         General: No swelling  Cervical back: Neck supple  Skin:     General: Skin is warm and dry  Neurological:      General: No focal deficit present  Mental Status: He is alert and oriented to person, place, and time  Psychiatric:         Mood and Affect: Mood normal           Discussion with Family: Updated  (son) via phone  Discharge instructions/Information to patient and family:   See after visit summary for information provided to patient and family  Provisions for Follow-Up Care:  See after visit summary for information related to follow-up care and any pertinent home health orders  Disposition:   Assisted Living Facility at [address] [place] [address]    Planned Readmission: No     Discharge Statement:  I spent 30 minutes discharging the patient  This time was spent on the day of discharge  I had direct contact with the patient on the day of discharge  Greater than 50% of the total time was spent examining patient, answering all patient questions, arranging and discussing plan of care with patient as well as directly providing post-discharge instructions  Additional time then spent on discharge activities  Discharge Medications:  See after visit summary for reconciled discharge medications provided to patient and/or family        **Please Note: This note may have been constructed using a voice recognition system**

## 2022-03-01 NOTE — CASE MANAGEMENT
Case Management Discharge Planning Note    Patient name Rosaura Graff  Location [facility] 409/[facility] 385-64 MRN [id number]  : 1926 Date 3/1/2022       Current Admission Date: 2022  Current Admission Diagnosis:Acute on chronic HFrEF (heart failure with reduced ejection fraction) [facility])   Patient Active Problem List    Diagnosis Date Noted    PVC's (premature ventricular contractions) 2022    Chronic renal disease, stage IV ([facility] [address] [facility] ) 2021    Chronic kidney disease-mineral and bone disorder 2021    Acute on chronic HFrEF (heart failure with reduced ejection fraction) ([facility] ) 2021    CKD (chronic kidney disease) stage 4, GFR 15-29 ml/min ([facility] ) 2013    Coronary artery disease 2013    Hypertension 2013    Benign hypertensive CKD, stage 1-4 or unspecified chronic kidney disease 2013    Hypercholesterolemia 10/08/2012      LOS (days): 3  Geometric Mean LOS (GMLOS) (days): 3 80  Days to GMLOS:0 6     OBJECTIVE:  Risk of Unplanned Readmission Score: 13         Current admission status: Inpatient   Preferred Pharmacy:   [address] [place] [address], [address] [place] [id number]  Phone: [phone] Fax: [phone]    Primary Care Provider: Arnie Garcia DO    Primary Insurance: MEDICARE  Secondary Insurance: BLUE CROSS    DISCHARGE DETAILS:  Additional Comments: CM called back Brandon Agosto at [facility] to confirm if Pt is able to return  Pt is able to come back today  Pt is all set-up with [facility] within 48-72 hrs  Pt's son will pickup the Pt  CM was told to fax over completed AVS to [phone]  CM will fax AVS to admissions per request  CM will continue to follow the Pt for DC

## 2022-03-01 NOTE — CASE MANAGEMENT
Case Management Discharge Planning Note    Patient name Ciara Mayers  Location [facility] 409/[facility] 551-29 MRN [id number]  : 1926 Date 3/1/2022       Current Admission Date: 2022  Current Admission Diagnosis:Acute on chronic HFrEF (heart failure with reduced ejection fraction) [facility])   Patient Active Problem List    Diagnosis Date Noted    PVC's (premature ventricular contractions) 2022    Chronic renal disease, stage IV ([facility] [address] ) 2021    Chronic kidney disease-mineral and bone disorder 2021    Acute on chronic HFrEF (heart failure with reduced ejection fraction) ([facility] ) 2021    CKD (chronic kidney disease) stage 4, GFR 15-29 ml/min ([facility] ) 2013    Coronary artery disease 2013    Hypertension 2013    Benign hypertensive CKD, stage 1-4 or unspecified chronic kidney disease 2013    Hypercholesterolemia 10/08/2012      LOS (days): 3  Geometric Mean LOS (GMLOS) (days): 3 80  Days to GMLOS:0 5     OBJECTIVE:  Risk of Unplanned Readmission Score: 13         Current admission status: Inpatient   Preferred Pharmacy:   [address], [address] [facility]  Phone: [phone] Fax: [phone]    Primary Care Provider: Oliva Mittla DO    Primary Insurance: MEDICARE  Secondary Insurance: BLUE CROSS    DISCHARGE DETAILS:  IMM Given (Date):: 22 (given at 0900)  IMM Given to[de-identified] Patient  Family notified[de-identified] D/C walk[facility]iri to [address] [facility] and son is transporting  Additional Comments: Discharge today to [facility], son to transport

## 2022-03-01 NOTE — PLAN OF CARE
Problem: MOBILITY - ADULT  Goal: Maintain or return to baseline ADL function  Description: INTERVENTIONS:  -  Assess patient's ability to carry out ADLs; assess patient's baseline for ADL function and identify physical deficits which impact ability to perform ADLs (bathing, care of mouth/teeth, toileting, grooming, dressing, etc )  - Assess/evaluate cause of self-care deficits   - Assess range of motion  - Assess patient's mobility; develop plan if impaired  - Assess patient's need for assistive devices and provide as appropriate  - Encourage maximum independence but intervene and supervise when necessary  - Involve family in performance of ADLs  - Assess for home care needs following discharge   - Consider OT consult to assist with ADL evaluation and planning for discharge  - Provide patient education as appropriate  Outcome: Adequate for Discharge  Goal: Maintains/Returns to pre admission functional level  Description: INTERVENTIONS:  - Perform BMAT or MOVE assessment daily    - Set and communicate daily mobility goal to care team and patient/family/caregiver     - Collaborate with rehabilitation services on mobility goals if consulted  - Ambulate patient 4 times a day  - Out of bed to chair 3 times a day   - Out of bed for meals 3 times a day  - Out of bed for toileting  - Record patient progress and toleration of activity level   Outcome: Adequate for Discharge     Problem: CARDIOVASCULAR - ADULT  Goal: Maintains optimal cardiac output and hemodynamic stability  Description: INTERVENTIONS:  - Monitor I/O, vital signs and rhythm  - Monitor for S/S and trends of decreased cardiac output  - Administer and titrate ordered vasoactive medications to optimize hemodynamic stability  - Assess quality of pulses, skin color and temperature  - Assess for signs of decreased coronary artery perfusion  - Instruct patient to report change in severity of symptoms  Outcome: Adequate for Discharge     Problem: Prexisting or High Potential for Compromised Skin Integrity  Goal: Skin integrity is maintained or improved  Description: INTERVENTIONS:  - Identify patients at risk for skin breakdown  - Assess and monitor skin integrity  - Assess and monitor nutrition and hydration status  - Monitor labs   - Assess for incontinence   - Turn and reposition patient  - Assist with mobility/ambulation  - Relieve pressure over bony prominences  - Avoid friction and shearing  - Provide appropriate hygiene as needed including keeping skin clean and dry  - Evaluate need for skin moisturizer/barrier cream  - Collaborate with interdisciplinary team   - Patient/family teaching  - Consider wound care consult   Outcome: Adequate for Discharge     Problem: Nutrition/Hydration-ADULT  Goal: Nutrient/Hydration intake appropriate for improving, restoring or maintaining nutritional needs  Description: Monitor and assess patient's nutrition/hydration status for malnutrition  Collaborate with interdisciplinary team and initiate plan and interventions as ordered  Monitor patient's weight and dietary intake as ordered or per policy  Utilize nutrition screening tool and intervene as necessary  Determine patient's food preferences and provide high-protein, high-caloric foods as appropriate       INTERVENTIONS:  - Monitor oral intake, urinary output, labs, and treatment plans  - Assess nutrition and hydration status and recommend course of action  - Evaluate amount of meals eaten  - Assist patient with eating if necessary   - Allow adequate time for meals  - Recommend/ encourage appropriate diets, oral nutritional supplements, and vitamin/mineral supplements  - Order, calculate, and assess calorie counts as needed  - Recommend, monitor, and adjust tube feedings and TPN/PPN based on assessed needs  - Assess need for intravenous fluids  - Provide specific nutrition/hydration education as appropriate  - Include patient/family/caregiver in decisions related to nutrition  Outcome: Adequate for Discharge     Problem: Potential for Falls  Goal: Patient will remain free of falls  Description: INTERVENTIONS:  - Educate patient/family on patient safety including physical limitations  - Instruct patient to call for assistance with activity   - Consult OT/PT to assist with strengthening/mobility   - Keep Call bell within reach  - Keep bed low and locked with side rails adjusted as appropriate  - Keep care items and personal belongings within reach  - Initiate and maintain comfort rounds  - Make Fall Risk Sign visible to staff  - Offer Toileting every 3 Hours, in advance of need  - Initiate/Maintain chair and bed alarm  - Obtain necessary fall risk management equipment: walker, nonskid socks  - Apply yellow socks and bracelet for high fall risk patients  - Consider moving patient to room near nurses station  Outcome: Adequate for Discharge

## 2022-03-03 ENCOUNTER — PATIENT OUTREACH (OUTPATIENT)
Dept: CASE MANAGEMENT | Facility: OTHER | Age: 87
End: 2022-03-03

## 2022-03-03 NOTE — PROGRESS NOTES
Outreach TC to facility for assessment of patient status  This writer spoke with Amanda Steiner who reports tat patient is independent with UB and LB ADL's  Patient is able to ambulate 100 ft independently with a RW  Patient eats % of all meals  No new medications since D/C  [facility] did complete SOC on 3/2/22  Chart review/facility calls will continue until end of episode

## 2022-03-04 ENCOUNTER — LAB REQUISITION (OUTPATIENT)
Dept: LAB | Facility: HOSPITAL | Age: 87
End: 2022-03-04
Payer: MEDICARE

## 2022-03-04 DIAGNOSIS — N18.4 CHRONIC KIDNEY DISEASE, STAGE 4 (SEVERE) (HCC): ICD-10-CM

## 2022-03-04 DIAGNOSIS — I25.5 ISCHEMIC CARDIOMYOPATHY: ICD-10-CM

## 2022-03-04 LAB
ANION GAP SERPL CALCULATED.3IONS-SCNC: 8 MMOL/L (ref 4–13)
BUN SERPL-MCNC: 46 MG/DL (ref 5–25)
CALCIUM SERPL-MCNC: 8.9 MG/DL (ref 8.3–10.1)
CHLORIDE SERPL-SCNC: 103 MMOL/L (ref 100–108)
CO2 SERPL-SCNC: 24 MMOL/L (ref 21–32)
CREAT SERPL-MCNC: 2.4 MG/DL (ref 0.6–1.3)
GFR SERPL CREATININE-BSD FRML MDRD: 22 ML/MIN/1.73SQ M
GLUCOSE SERPL-MCNC: 114 MG/DL (ref 65–140)
POTASSIUM SERPL-SCNC: 4.4 MMOL/L (ref 3.5–5.3)
SODIUM SERPL-SCNC: 135 MMOL/L (ref 136–145)

## 2022-03-04 PROCEDURE — 80048 BASIC METABOLIC PNL TOTAL CA: CPT | Performed by: INTERNAL MEDICINE

## 2022-03-06 ENCOUNTER — APPOINTMENT (OUTPATIENT)
Dept: RADIOLOGY | Facility: HOSPITAL | Age: 87
End: 2022-03-06
Payer: MEDICARE

## 2022-03-06 ENCOUNTER — HOSPITAL ENCOUNTER (OUTPATIENT)
Facility: HOSPITAL | Age: 87
Setting detail: OBSERVATION
Discharge: HOME | End: 2022-03-07
Attending: EMERGENCY MEDICINE | Admitting: INTERNAL MEDICINE
Payer: MEDICARE

## 2022-03-06 ENCOUNTER — APPOINTMENT (EMERGENCY)
Dept: RADIOLOGY | Facility: HOSPITAL | Age: 87
End: 2022-03-06
Payer: MEDICARE

## 2022-03-06 DIAGNOSIS — R07.89 ATYPICAL CHEST PAIN: Primary | ICD-10-CM

## 2022-03-06 DIAGNOSIS — I50.23 ACUTE ON CHRONIC HFREF (HEART FAILURE WITH REDUCED EJECTION FRACTION) (HCC): ICD-10-CM

## 2022-03-06 DIAGNOSIS — I25.10 CORONARY ARTERY DISEASE INVOLVING NATIVE HEART, UNSPECIFIED VESSEL OR LESION TYPE, UNSPECIFIED WHETHER ANGINA PRESENT: ICD-10-CM

## 2022-03-06 PROBLEM — I50.42 CHRONIC COMBINED SYSTOLIC AND DIASTOLIC HEART FAILURE (HCC): Status: ACTIVE | Noted: 2021-08-26

## 2022-03-06 PROBLEM — G93.40 ENCEPHALOPATHY: Status: ACTIVE | Noted: 2022-03-06

## 2022-03-06 PROBLEM — R41.0 CONFUSION: Status: ACTIVE | Noted: 2022-03-06

## 2022-03-06 PROBLEM — I25.9 CHEST PAIN DUE TO MYOCARDIAL ISCHEMIA: Status: ACTIVE | Noted: 2022-03-06

## 2022-03-06 PROBLEM — R41.89 COGNITIVE DECLINE: Status: ACTIVE | Noted: 2022-03-06

## 2022-03-06 LAB
2HR DELTA HS TROPONIN: 0 NG/L
4HR DELTA HS TROPONIN: 2 NG/L
ALBUMIN SERPL BCP-MCNC: 3.2 G/DL (ref 3.5–5)
ALP SERPL-CCNC: 94 U/L (ref 46–116)
ALT SERPL W P-5'-P-CCNC: 112 U/L (ref 12–78)
ANION GAP SERPL CALCULATED.3IONS-SCNC: 5 MMOL/L (ref 4–13)
ANION GAP SERPL CALCULATED.3IONS-SCNC: 6 MMOL/L (ref 4–13)
AST SERPL W P-5'-P-CCNC: 56 U/L (ref 5–45)
ATRIAL RATE: 80 BPM
BASOPHILS # BLD AUTO: 0.11 THOUSANDS/ΜL (ref 0–0.1)
BASOPHILS # BLD AUTO: 0.11 THOUSANDS/ΜL (ref 0–0.1)
BASOPHILS NFR BLD AUTO: 1 % (ref 0–1)
BASOPHILS NFR BLD AUTO: 1 % (ref 0–1)
BILIRUB SERPL-MCNC: 0.38 MG/DL (ref 0.2–1)
BUN SERPL-MCNC: 45 MG/DL (ref 5–25)
BUN SERPL-MCNC: 49 MG/DL (ref 5–25)
CALCIUM ALBUM COR SERPL-MCNC: 9.1 MG/DL (ref 8.3–10.1)
CALCIUM SERPL-MCNC: 8.5 MG/DL (ref 8.3–10.1)
CALCIUM SERPL-MCNC: 9.2 MG/DL (ref 8.3–10.1)
CARDIAC TROPONIN I PNL SERPL HS: 15 NG/L
CARDIAC TROPONIN I PNL SERPL HS: 15 NG/L
CARDIAC TROPONIN I PNL SERPL HS: 17 NG/L
CHLORIDE SERPL-SCNC: 107 MMOL/L (ref 100–108)
CHLORIDE SERPL-SCNC: 108 MMOL/L (ref 100–108)
CO2 SERPL-SCNC: 23 MMOL/L (ref 21–32)
CO2 SERPL-SCNC: 23 MMOL/L (ref 21–32)
CREAT SERPL-MCNC: 2.18 MG/DL (ref 0.6–1.3)
CREAT SERPL-MCNC: 2.23 MG/DL (ref 0.6–1.3)
EOSINOPHIL # BLD AUTO: 0.37 THOUSAND/ΜL (ref 0–0.61)
EOSINOPHIL # BLD AUTO: 0.39 THOUSAND/ΜL (ref 0–0.61)
EOSINOPHIL NFR BLD AUTO: 4 % (ref 0–6)
EOSINOPHIL NFR BLD AUTO: 5 % (ref 0–6)
ERYTHROCYTE [DISTWIDTH] IN BLOOD BY AUTOMATED COUNT: 14.6 % (ref 11.6–15.1)
ERYTHROCYTE [DISTWIDTH] IN BLOOD BY AUTOMATED COUNT: 14.6 % (ref 11.6–15.1)
GFR SERPL CREATININE-BSD FRML MDRD: 24 ML/MIN/1.73SQ M
GFR SERPL CREATININE-BSD FRML MDRD: 24 ML/MIN/1.73SQ M
GLUCOSE SERPL-MCNC: 117 MG/DL (ref 65–140)
GLUCOSE SERPL-MCNC: 98 MG/DL (ref 65–140)
HCT VFR BLD AUTO: 33.1 % (ref 36.5–49.3)
HCT VFR BLD AUTO: 34.2 % (ref 36.5–49.3)
HGB BLD-MCNC: 11.2 G/DL (ref 12–17)
HGB BLD-MCNC: 11.7 G/DL (ref 12–17)
IMM GRANULOCYTES # BLD AUTO: 0.05 THOUSAND/UL (ref 0–0.2)
IMM GRANULOCYTES # BLD AUTO: 0.05 THOUSAND/UL (ref 0–0.2)
IMM GRANULOCYTES NFR BLD AUTO: 1 % (ref 0–2)
IMM GRANULOCYTES NFR BLD AUTO: 1 % (ref 0–2)
LYMPHOCYTES # BLD AUTO: 1.01 THOUSANDS/ΜL (ref 0.6–4.47)
LYMPHOCYTES # BLD AUTO: 1.26 THOUSANDS/ΜL (ref 0.6–4.47)
LYMPHOCYTES NFR BLD AUTO: 11 % (ref 14–44)
LYMPHOCYTES NFR BLD AUTO: 15 % (ref 14–44)
MAGNESIUM SERPL-MCNC: 2.4 MG/DL (ref 1.6–2.6)
MCH RBC QN AUTO: 32.5 PG (ref 26.8–34.3)
MCH RBC QN AUTO: 32.6 PG (ref 26.8–34.3)
MCHC RBC AUTO-ENTMCNC: 33.8 G/DL (ref 31.4–37.4)
MCHC RBC AUTO-ENTMCNC: 34.2 G/DL (ref 31.4–37.4)
MCV RBC AUTO: 95 FL (ref 82–98)
MCV RBC AUTO: 96 FL (ref 82–98)
MONOCYTES # BLD AUTO: 0.79 THOUSAND/ΜL (ref 0.17–1.22)
MONOCYTES # BLD AUTO: 0.8 THOUSAND/ΜL (ref 0.17–1.22)
MONOCYTES NFR BLD AUTO: 10 % (ref 4–12)
MONOCYTES NFR BLD AUTO: 9 % (ref 4–12)
NEUTROPHILS # BLD AUTO: 5.61 THOUSANDS/ΜL (ref 1.85–7.62)
NEUTROPHILS # BLD AUTO: 7 THOUSANDS/ΜL (ref 1.85–7.62)
NEUTS SEG NFR BLD AUTO: 68 % (ref 43–75)
NEUTS SEG NFR BLD AUTO: 74 % (ref 43–75)
NRBC BLD AUTO-RTO: 0 /100 WBCS
NRBC BLD AUTO-RTO: 0 /100 WBCS
NT-PROBNP SERPL-MCNC: ABNORMAL PG/ML
PLATELET # BLD AUTO: 143 THOUSANDS/UL (ref 149–390)
PLATELET # BLD AUTO: 145 THOUSANDS/UL (ref 149–390)
PLATELET # BLD AUTO: 157 THOUSANDS/UL (ref 149–390)
PMV BLD AUTO: 11.4 FL (ref 8.9–12.7)
PMV BLD AUTO: 11.5 FL (ref 8.9–12.7)
PMV BLD AUTO: 12 FL (ref 8.9–12.7)
POTASSIUM SERPL-SCNC: 4.1 MMOL/L (ref 3.5–5.3)
POTASSIUM SERPL-SCNC: 4.2 MMOL/L (ref 3.5–5.3)
PROT SERPL-MCNC: 6.4 G/DL (ref 6.4–8.2)
QRS AXIS: 264 DEGREES
QRSD INTERVAL: 152 MS
QT INTERVAL: 462 MS
QTC INTERVAL: 512 MS
RBC # BLD AUTO: 3.44 MILLION/UL (ref 3.88–5.62)
RBC # BLD AUTO: 3.6 MILLION/UL (ref 3.88–5.62)
SODIUM SERPL-SCNC: 135 MMOL/L (ref 136–145)
SODIUM SERPL-SCNC: 137 MMOL/L (ref 136–145)
T WAVE AXIS: 116 DEGREES
VENTRICULAR RATE: 74 BPM
WBC # BLD AUTO: 8.19 THOUSAND/UL (ref 4.31–10.16)
WBC # BLD AUTO: 9.36 THOUSAND/UL (ref 4.31–10.16)

## 2022-03-06 PROCEDURE — 83880 ASSAY OF NATRIURETIC PEPTIDE: CPT | Performed by: EMERGENCY MEDICINE

## 2022-03-06 PROCEDURE — 85025 COMPLETE CBC W/AUTO DIFF WBC: CPT | Performed by: INTERNAL MEDICINE

## 2022-03-06 PROCEDURE — 97163 PT EVAL HIGH COMPLEX 45 MIN: CPT

## 2022-03-06 PROCEDURE — 99285 EMERGENCY DEPT VISIT HI MDM: CPT

## 2022-03-06 PROCEDURE — 99024 POSTOP FOLLOW-UP VISIT: CPT | Performed by: PHYSICIAN ASSISTANT

## 2022-03-06 PROCEDURE — 84484 ASSAY OF TROPONIN QUANT: CPT | Performed by: EMERGENCY MEDICINE

## 2022-03-06 PROCEDURE — G1004 CDSM NDSC: HCPCS

## 2022-03-06 PROCEDURE — 97166 OT EVAL MOD COMPLEX 45 MIN: CPT

## 2022-03-06 PROCEDURE — 85025 COMPLETE CBC W/AUTO DIFF WBC: CPT | Performed by: EMERGENCY MEDICINE

## 2022-03-06 PROCEDURE — 99220 PR INITIAL OBSERVATION CARE/DAY 70 MINUTES: CPT | Performed by: INTERNAL MEDICINE

## 2022-03-06 PROCEDURE — 84484 ASSAY OF TROPONIN QUANT: CPT | Performed by: INTERNAL MEDICINE

## 2022-03-06 PROCEDURE — 99215 OFFICE O/P EST HI 40 MIN: CPT | Performed by: INTERNAL MEDICINE

## 2022-03-06 PROCEDURE — 93010 ELECTROCARDIOGRAM REPORT: CPT | Performed by: INTERNAL MEDICINE

## 2022-03-06 PROCEDURE — 83735 ASSAY OF MAGNESIUM: CPT | Performed by: INTERNAL MEDICINE

## 2022-03-06 PROCEDURE — 93005 ELECTROCARDIOGRAM TRACING: CPT

## 2022-03-06 PROCEDURE — 80053 COMPREHEN METABOLIC PANEL: CPT | Performed by: INTERNAL MEDICINE

## 2022-03-06 PROCEDURE — 71046 X-RAY EXAM CHEST 2 VIEWS: CPT

## 2022-03-06 PROCEDURE — 80048 BASIC METABOLIC PNL TOTAL CA: CPT | Performed by: EMERGENCY MEDICINE

## 2022-03-06 PROCEDURE — 99285 EMERGENCY DEPT VISIT HI MDM: CPT | Performed by: EMERGENCY MEDICINE

## 2022-03-06 PROCEDURE — 70450 CT HEAD/BRAIN W/O DYE: CPT

## 2022-03-06 PROCEDURE — 85049 AUTOMATED PLATELET COUNT: CPT | Performed by: INTERNAL MEDICINE

## 2022-03-06 PROCEDURE — 36415 COLL VENOUS BLD VENIPUNCTURE: CPT | Performed by: EMERGENCY MEDICINE

## 2022-03-06 RX ORDER — HEPARIN SODIUM 5000 [USP'U]/ML
5000 INJECTION, SOLUTION INTRAVENOUS; SUBCUTANEOUS EVERY 8 HOURS SCHEDULED
Status: DISCONTINUED | OUTPATIENT
Start: 2022-03-06 | End: 2022-03-07 | Stop reason: HOSPADM

## 2022-03-06 RX ORDER — PRAVASTATIN SODIUM 20 MG
20 TABLET ORAL DAILY
Status: DISCONTINUED | OUTPATIENT
Start: 2022-03-06 | End: 2022-03-07 | Stop reason: HOSPADM

## 2022-03-06 RX ORDER — MAGNESIUM HYDROXIDE/ALUMINUM HYDROXICE/SIMETHICONE 120; 1200; 1200 MG/30ML; MG/30ML; MG/30ML
30 SUSPENSION ORAL EVERY 6 HOURS PRN
Status: DISCONTINUED | OUTPATIENT
Start: 2022-03-06 | End: 2022-03-07 | Stop reason: HOSPADM

## 2022-03-06 RX ORDER — ISOSORBIDE MONONITRATE 30 MG/1
15 TABLET, EXTENDED RELEASE ORAL DAILY
Status: DISCONTINUED | OUTPATIENT
Start: 2022-03-06 | End: 2022-03-07 | Stop reason: HOSPADM

## 2022-03-06 RX ORDER — VALSARTAN 40 MG/1
40 TABLET ORAL DAILY
Status: ON HOLD | COMMUNITY
End: 2022-03-07 | Stop reason: ALTCHOICE

## 2022-03-06 RX ORDER — DOCUSATE SODIUM 100 MG/1
100 CAPSULE, LIQUID FILLED ORAL 2 TIMES DAILY PRN
Status: DISCONTINUED | OUTPATIENT
Start: 2022-03-06 | End: 2022-03-07 | Stop reason: HOSPADM

## 2022-03-06 RX ORDER — ACETAMINOPHEN 325 MG/1
650 TABLET ORAL EVERY 6 HOURS PRN
Status: DISCONTINUED | OUTPATIENT
Start: 2022-03-06 | End: 2022-03-07 | Stop reason: HOSPADM

## 2022-03-06 RX ORDER — ASPIRIN 81 MG/1
81 TABLET ORAL DAILY
Status: DISCONTINUED | OUTPATIENT
Start: 2022-03-06 | End: 2022-03-07 | Stop reason: HOSPADM

## 2022-03-06 RX ORDER — CLOPIDOGREL BISULFATE 75 MG/1
75 TABLET ORAL DAILY
Status: DISCONTINUED | OUTPATIENT
Start: 2022-03-06 | End: 2022-03-07 | Stop reason: HOSPADM

## 2022-03-06 RX ORDER — FUROSEMIDE 20 MG/1
20 TABLET ORAL 2 TIMES DAILY
Status: DISCONTINUED | OUTPATIENT
Start: 2022-03-06 | End: 2022-03-07 | Stop reason: HOSPADM

## 2022-03-06 RX ORDER — ONDANSETRON 2 MG/ML
4 INJECTION INTRAMUSCULAR; INTRAVENOUS EVERY 6 HOURS PRN
Status: DISCONTINUED | OUTPATIENT
Start: 2022-03-06 | End: 2022-03-07 | Stop reason: HOSPADM

## 2022-03-06 RX ORDER — CALCIUM CARBONATE 500(1250)
1 TABLET ORAL
Status: DISCONTINUED | OUTPATIENT
Start: 2022-03-06 | End: 2022-03-07 | Stop reason: HOSPADM

## 2022-03-06 RX ORDER — METOPROLOL TARTRATE 50 MG/1
50 TABLET, FILM COATED ORAL 2 TIMES DAILY
Status: DISCONTINUED | OUTPATIENT
Start: 2022-03-06 | End: 2022-03-07 | Stop reason: HOSPADM

## 2022-03-06 RX ADMIN — PRAVASTATIN SODIUM 20 MG: 20 TABLET ORAL at 09:07

## 2022-03-06 RX ADMIN — B-COMPLEX W/ C & FOLIC ACID TAB 1 TABLET: TAB at 11:22

## 2022-03-06 RX ADMIN — ALUMINA, MAGNESIA, AND SIMETHICONE ORAL SUSPENSION REGULAR STRENGTH 30 ML: 1200; 1200; 120 SUSPENSION ORAL at 16:09

## 2022-03-06 RX ADMIN — METOPROLOL TARTRATE 50 MG: 50 TABLET, FILM COATED ORAL at 09:07

## 2022-03-06 RX ADMIN — FUROSEMIDE 20 MG: 20 TABLET ORAL at 09:06

## 2022-03-06 RX ADMIN — HEPARIN SODIUM 5000 UNITS: 5000 INJECTION INTRAVENOUS; SUBCUTANEOUS at 13:39

## 2022-03-06 RX ADMIN — HEPARIN SODIUM 5000 UNITS: 5000 INJECTION INTRAVENOUS; SUBCUTANEOUS at 05:37

## 2022-03-06 RX ADMIN — HEPARIN SODIUM 5000 UNITS: 5000 INJECTION INTRAVENOUS; SUBCUTANEOUS at 22:00

## 2022-03-06 RX ADMIN — ASPIRIN 81 MG: 81 TABLET, COATED ORAL at 09:06

## 2022-03-06 RX ADMIN — FUROSEMIDE 20 MG: 20 TABLET ORAL at 17:28

## 2022-03-06 RX ADMIN — METOPROLOL TARTRATE 50 MG: 50 TABLET, FILM COATED ORAL at 17:28

## 2022-03-06 RX ADMIN — CALCIUM 1 TABLET: 500 TABLET ORAL at 09:05

## 2022-03-06 RX ADMIN — ACETAMINOPHEN 650 MG: 325 TABLET ORAL at 19:08

## 2022-03-06 RX ADMIN — ISOSORBIDE MONONITRATE 15 MG: 30 TABLET, EXTENDED RELEASE ORAL at 11:22

## 2022-03-06 RX ADMIN — CLOPIDOGREL BISULFATE 75 MG: 75 TABLET ORAL at 09:06

## 2022-03-06 NOTE — ED PROVIDER NOTES
History  Chief Complaint   Patient presents with    Chest Pain     pt from [facility], recent hospital admission with new dx CHF  pt reports being woken from sleep with chest pain  pt given ASA via EMS  denies CP upon assessment     15-year-old male history of CAD status post CABG on aspirin, hypertension, newly diagnosed CHF EF 25% presenting with chest pain  Patient reports episode of nonexertional chest pain a couple hours ago that improved spontaneously  Patient unclear duration  Denies any associated diaphoresis, nausea/vomiting, fainting/lightheadedness, or radiation  Patient denies any complaints at this time  Denies any headache, vision changes, shortness of breath, abdominal pain, nausea vomiting, fever/chills, any bladder or bowel changes  Prior to Admission Medications   Prescriptions Last Dose Informant Patient Reported? Taking?    Biotin (RA Biotin) 2 5 MG CAPS  Self Yes No   Sig: Take by mouth daily   Multiple Vitamin (multivitamin) capsule  Self Yes No   Sig: Take 1 capsule by mouth daily   aspirin (ASPIRIN LOW DOSE) 81 mg EC tablet  Self Yes No   Sig: Take 1 tablet by mouth daily   calcium carbonate (OS-VIRGEN) 1250 (500 Ca) MG tablet  Self Yes No   Sig: Take 600 mg by mouth   cholecalciferol (VITAMIN D3) 1,000 units tablet  Self Yes No   Sig: Take 1,000 Units by mouth daily   clopidogrel (Plavix) 75 mg tablet   No No   Sig: Take 1 tablet (75 mg total) by mouth daily   furosemide (LASIX) 20 mg tablet   No No   Sig: Take 1 tablet (20 mg total) by mouth 2 (two) times a day   metoprolol tartrate (LOPRESSOR) 50 mg tablet   No No   Sig: Take 1 tablet (50 mg total) by mouth 2 (two) times a day   pravastatin (PRAVACHOL) 20 mg tablet   No No   Sig: Take 1 tablet (20 mg total) by mouth daily      Facility-Administered Medications: None       Past Medical History:   Diagnosis Date    Chronic renal disease, stage III ([address] )     Chronic renal disease, stage III ([address] )     Essential hypertension, benign     Essential hypertension, benign        Past Surgical History:   Procedure Laterality Date    CORONARY ARTERY BYPASS GRAFT  2012    x4       Family History   Problem Relation Age of Onset    No Known Problems Mother     No Known Problems Father      I have reviewed and agree with the history as documented  E-Cigarette/Vaping    E-Cigarette Use Never User      E-Cigarette/Vaping Substances     Social History     Tobacco Use    Smoking status: Never Smoker    Smokeless tobacco: Never Used   Vaping Use    Vaping Use: Never used   Substance Use Topics    Alcohol use: Not Currently    Drug use: No        Review of Systems   Constitutional: Negative for appetite change, chills, diaphoresis, fever and unexpected weight change  HENT: Negative for congestion and rhinorrhea  Eyes: Negative for photophobia and visual disturbance  Respiratory: Negative for cough, chest tightness and shortness of breath  Cardiovascular: Positive for chest pain  Negative for palpitations and leg swelling  Gastrointestinal: Negative for abdominal distention, abdominal pain, blood in stool, constipation, diarrhea, nausea and vomiting  Genitourinary: Negative for dysuria and hematuria  Musculoskeletal: Negative for back pain, joint swelling, neck pain and neck stiffness  Skin: Negative for color change, pallor, rash and wound  Neurological: Negative for dizziness, syncope, weakness, light-headedness and headaches  Psychiatric/Behavioral: Negative for agitation  All other systems reviewed and are negative        Physical Exam  ED Triage Vitals [03/06/22 0018]   Temperature Pulse Respirations Blood Pressure SpO2   98 2 °F (36 8 °C) 75 22 134/85 97 %      Temp Source Heart Rate Source Patient Position - Orthostatic VS BP Location FiO2 (%)   Tympanic Monitor Sitting Right arm --      Pain Score       No Pain             Orthostatic Vital Signs  Vitals:    03/06/22 0018 03/06/22 0030 03/06/22 0100   BP: 134/85 125/80 117/71   Pulse: 75 70 72   Patient Position - Orthostatic VS: Sitting Sitting Sitting       Physical Exam  Vitals and nursing note reviewed  Constitutional:       General: He is not in acute distress  Appearance: Normal appearance  He is well-developed  He is not ill-appearing, toxic-appearing or diaphoretic  HENT:      Head: Normocephalic and atraumatic  Nose: Nose normal  No congestion or rhinorrhea  Mouth/Throat:      Mouth: Mucous membranes are moist       Pharynx: Oropharynx is clear  No oropharyngeal exudate or posterior oropharyngeal erythema  Eyes:      General: No scleral icterus  Right eye: No discharge  Left eye: No discharge  Extraocular Movements: Extraocular movements intact  Conjunctiva/sclera: Conjunctivae normal       Pupils: Pupils are equal, round, and reactive to light  Neck:      Vascular: No JVD  Trachea: No tracheal deviation  Cardiovascular:      Rate and Rhythm: Normal rate and regular rhythm  Heart sounds: Normal heart sounds  No murmur heard  No friction rub  No gallop  Comments: Normal rate and regular rhythm  Pulmonary:      Effort: Pulmonary effort is normal  No respiratory distress  Breath sounds: No stridor  Examination of the right-upper field reveals wheezing  Examination of the left-upper field reveals wheezing  Examination of the right-middle field reveals wheezing  Examination of the left-middle field reveals wheezing  Examination of the right-lower field reveals wheezing and rales  Examination of the left-lower field reveals wheezing and rales  Wheezing and rales present  Comments: Clear to auscultation bilaterally  Chest:      Chest wall: No tenderness  Abdominal:      General: Bowel sounds are normal  There is no distension  Palpations: Abdomen is soft  Tenderness: There is no abdominal tenderness   There is no right CVA tenderness, left CVA tenderness, guarding or rebound  Comments: Soft, nontender, nondistended  Normal bowel sounds throughout   Musculoskeletal:         General: No swelling, tenderness, deformity or signs of injury  Normal range of motion  Cervical back: Normal range of motion and neck supple  No rigidity  No muscular tenderness  Right lower leg: No edema  Left lower leg: No edema  Lymphadenopathy:      Cervical: No cervical adenopathy  Skin:     General: Skin is warm and dry  Coloration: Skin is not pale  Findings: No erythema or rash  Neurological:      General: No focal deficit present  Mental Status: He is alert  Mental status is at baseline  Sensory: No sensory deficit  Motor: No weakness or abnormal muscle tone  Coordination: Coordination normal       Gait: Gait normal       Comments: Alert  Strength and sensation grossly intact   Psychiatric:         Behavior: Behavior normal          Thought Content:  Thought content normal          ED Medications  Medications   aspirin (ECOTRIN LOW STRENGTH) EC tablet 81 mg (has no administration in time range)   calcium carbonate (OYSTER SHELL,OSCAL) 500 mg tablet 1 tablet (has no administration in time range)   clopidogrel (PLAVIX) tablet 75 mg (has no administration in time range)   furosemide (LASIX) tablet 20 mg (has no administration in time range)   metoprolol tartrate (LOPRESSOR) tablet 50 mg (has no administration in time range)   multivitamin stress formula tablet 1 tablet (has no administration in time range)   pravastatin (PRAVACHOL) tablet 20 mg (has no administration in time range)   acetaminophen (TYLENOL) tablet 650 mg (has no administration in time range)   docusate sodium (COLACE) capsule 100 mg (has no administration in time range)   ondansetron (ZOFRAN) injection 4 mg (has no administration in time range)   aluminum-magnesium hydroxide-simethicone (MYLANTA) oral suspension 30 mL (has no administration in time range)   heparin (porcine) subcutaneous injection 5,000 Units (has no administration in time range)       Diagnostic Studies  Results Reviewed     Procedure Component Value Units Date/Time    Comprehensive metabolic panel [[id number]]     Lab Status: No result Specimen: Blood     Magnesium [[id number]]     Lab Status: No result Specimen: Blood     CBC and differential [[id number]]     Lab Status: No result Specimen: Blood     HS Troponin I 2hr [[id number]]  (Normal) Collected: 03/06/22 0248    Lab Status: Final result Specimen: Blood from Arm, Left Updated: 03/06/22 0328     hs TnI 2hr 15 ng/L      Delta 2hr hsTnI 0 ng/L     Platelet count [[id number]]  (Normal) Collected: 03/06/22 0248    Lab Status: Final result Specimen: Blood from Arm, Left Updated: 03/06/22 0301     Platelets 280 Thousands/uL      MPV 11 5 fL     HS Troponin I 4hr [[id number]]     Lab Status: No result Specimen: Blood     NT-BNP PRO [[id number]]  (Abnormal) Collected: 03/06/22 0049    Lab Status: Final result Specimen: Blood from Arm, Left Updated: 03/06/22 0237     NT-proBNP 13,239 pg/mL     HS Troponin 0hr (reflex protocol) [[id number]]  (Normal) Collected: 03/06/22 0049    Lab Status: Final result Specimen: Blood from Arm, Left Updated: 03/06/22 0141     hs TnI 0hr 15 ng/L     Basic metabolic panel [[id number]]  (Abnormal) Collected: 03/06/22 0049    Lab Status: Final result Specimen: Blood from Arm, Left Updated: 03/06/22 0128     Sodium 135 mmol/L      Potassium 4 1 mmol/L      Chloride 107 mmol/L      CO2 23 mmol/L      ANION GAP 5 mmol/L      BUN 49 mg/dL      Creatinine 2 23 mg/dL      Glucose 117 mg/dL      Calcium 9 2 mg/dL      eGFR 24 ml/min/1 73sq m     Narrative:      [place] guidelines for Chronic Kidney Disease (CKD):     Stage 1 with normal or high GFR (GFR > 90 mL/min/1 73 square meters)    Stage 2 Mild CKD (GFR = 60-89 mL/min/1 73 square meters)    Stage 3A Moderate CKD (GFR = 45-59 mL/min/1 73 square meters)    Stage 3B Moderate CKD (GFR = 30-44 mL/min/1 73 square meters)    Stage 4 Severe CKD (GFR = 15-29 mL/min/1 73 square meters)    Stage 5 End Stage CKD (GFR <15 mL/min/1 73 square meters)  Note: GFR calculation is accurate only with a steady state creatinine    CBC and differential [[id number]]  (Abnormal) Collected: 03/06/22 0049    Lab Status: Final result Specimen: Blood from Arm, Left Updated: 03/06/22 0059     WBC 9 36 Thousand/uL      RBC 3 60 Million/uL      Hemoglobin 11 7 g/dL      Hematocrit 34 2 %      MCV 95 fL      MCH 32 5 pg      MCHC 34 2 g/dL      RDW 14 6 %      MPV 12 0 fL      Platelets 736 Thousands/uL      nRBC 0 /100 WBCs      Neutrophils Relative 74 %      Immat GRANS % 1 %      Lymphocytes Relative 11 %      Monocytes Relative 9 %      Eosinophils Relative 4 %      Basophils Relative 1 %      Neutrophils Absolute 7 00 Thousands/µL      Immature Grans Absolute 0 05 Thousand/uL      Lymphocytes Absolute 1 01 Thousands/µL      Monocytes Absolute 0 80 Thousand/µL      Eosinophils Absolute 0 39 Thousand/µL      Basophils Absolute 0 11 Thousands/µL                  XR chest 2 views   ED Interpretation by Terrence Storey MD (03/06 0131)   Mild vascular congestion      CT head wo contrast    (Results Pending)         Procedures  Procedures      ED Course             HEART Risk Score      Most Recent Value   Heart Score Risk Calculator    History 0 Filed at: 03/06/2022 0413   ECG 1 Filed at: 03/06/2022 0413   Age 2 Filed at: 03/06/2022 0413   Risk Factors 2 Filed at: 03/06/2022 0413   Troponin 1 Filed at: 03/06/2022 0413   HEART Score 6 Filed at: 03/06/2022 0413        Identification of Seniors at Risk      Most Recent Value   (ISAR) Identification of Seniors at Risk    Before the illness or injury that brought you to the Emergency, did you need someone to help you on a regular basis?  1 Filed at: 03/06/2022 0020   In the last 24 hours, have you needed more help than usual? 0 Filed at: 03/06/2022 0020   Have you been hospitalized for one or more nights during the past 6 months? 1 Filed at: 03/06/2022 0020   In general, do you see well? 1 Filed at: 03/06/2022 0020   In general, do you have serious problems with your memory? 0 Filed at: 03/06/2022 0020   Do you take more than three different medications every day? 1 Filed at: 03/06/2022 0020   ISAR Score 4 Filed at: 03/06/2022 0020                              MDM  Number of Diagnoses or Management Options  Atypical chest pain  Diagnosis management comments:  80-year-old male history of CAD status post CABG on aspirin, hypertension, newly diagnosed CHF EF 25% presenting with chest pain  Chest pain in setting of previous CABG on aspirin and Plavix plus recently diagnosed heart failure EF 25%  Plan for cardiac evaluation plus BNP  Chest x-ray  Patient already received 325 aspirin  Reassess  Labs no acute process  Chest x-ray mild interstitial edema  Patient remains stable  Recommending admission         Amount and/or Complexity of Data Reviewed  Clinical lab tests: reviewed and ordered  Tests in the radiology section of CPT®: ordered and reviewed  Tests in the medicine section of CPT®: reviewed and ordered  Review and summarize past medical records: yes  Discuss the patient with other providers: yes  Independent visualization of images, tracings, or specimens: yes    Risk of Complications, Morbidity, and/or Mortality  Presenting problems: high  Diagnostic procedures: high  Management options: high    Patient Progress  Patient progress: stable      Disposition  Final diagnoses:   Atypical chest pain     Time reflects when diagnosis was documented in both MDM as applicable and the Disposition within this note     Time User Action Codes Description Comment    3/6/2022 12:51 AM Brittney Modi Add [R07 89] Atypical chest pain     3/6/2022  2:34 AM Sara Reeves Add [I25 10] Coronary artery disease involving native heart, unspecified vessel or lesion type, unspecified whether angina present     3/6/2022  2:34 AM Gretchen PEREYRA Add [I50 23] Acute on chronic HFrEF (heart failure with reduced ejection fraction) [facility])       ED Disposition     ED Disposition Condition Date/Time Comment    Admit Stable Sweta Mar 6, 2022  2:08 AM Case was discussed with BERHANE and the patient's admission status was agreed to be Admission Status: observation status to the service of Dr Ligia August   Follow-up Information    None         Patient's Medications   Discharge Prescriptions    No medications on file     No discharge procedures on file  PDMP Review     None           ED Provider  Attending physically available and evaluated Marck Chase I managed the patient along with the ED Attending      Electronically Signed by         Bony Cruz MD  03/06/22 [id number]

## 2022-03-06 NOTE — H&P
10 [facility] 12/6/1926, 80 y o  male MRN: [id number]  Unit/Bed#: ED 11 Encounter: [id number]  Primary Care Provider: Isma Blue DO   Date and time admitted to hospital: 3/6/2022 12:11 AM    * Chest pain due to myocardial ischemia  Assessment & Plan  Patient awakened from sleep with chest pain; currently with mild abdominal breathing  Chest x-ray consistent with heart failure although much improved than previous in the last admission  Will get troponins x3; first one is 15  This is improved from previous troponin in last admission of 27  May benefit from diuresis Lasix 20 mg x 1 intravenous  But will continue with Lasix 20 mg twice daily by mouth  Daily weights and input output; cardiology consult  May need to increase Lasix dosing? Continue metoprolol  Acute on chronic HFrEF (heart failure with reduced ejection fraction) ([facility])  Assessment & Plan  Wt Readings from Last 3 Encounters:   03/01/22 58 1 kg (128 lb 1 4 oz)   12/08/21 59 9 kg (132 lb)   08/26/21 58 kg (127 lb 12 8 oz)     Please see plans regarding chest pain  Confusion  Assessment & Plan  Since patient was admitted for heart failure last time, son notes that the patient is less mentally active and engaging  Questionable generalized ischemic brain as result of heart failure? Will get CT scan of head to look for any other organic brain disease  CT of head without contrast     Hypercholesterolemia  Assessment & Plan  On pravastatin  Hypertension  Assessment & Plan  Continue metoprolol  CKD (chronic kidney disease) stage 4, GFR 15-29 ml/min [facility])  Assessment & Plan  Lab Results   Component Value Date    EGFR 24 03/06/2022    EGFR 22 03/04/2022    EGFR 23 03/01/2022    CREATININE 2 23 (H) 03/06/2022    CREATININE 2 40 (H) 03/04/2022    CREATININE 2 25 (H) 03/01/2022   Will continue to watch creatinine    May need to diurese patient but will leave to Cardiology outpatient diuretic dosing  VTE Prophylaxis: Heparin  / sequential compression device   Code Status: Level 3 - DNAR and DNI as discussed with son is in the  POLST: There is no POLST form on file for this patient (pre-hospital)    Anticipated Length of Stay:  Patient will be admitted on an Observation basis with an anticipated length of stay of  less than 2 midnights  Justification for Hospital Stay: Please see detailed plans noted above  Chief Complaint:     Chest pain  History of Present Illness:  Luiza Isidro is a 80 y o  male who has past medical history significant for recent admission for heart failure for which the echocardiogram of February 26, 2022 showed the following findings:LVEF 40%, systolic function severely reduced, no R WMA, severe global hypokinesis, diastolic function abnormal, mild to moderate AR, moderate MR, mild-to-moderate TR with estimated PA pressure of 45 mmHg, moderately sized left pleural effusion; he has a history of CAD status post CABG x4 currently on aspirin and Plavix with metoprolol  Valsartan was held due to the elevation of creatinine  He also has a history of mixed valvular disease, hypertension and hyperlipidemia  Currently, the patient is staying in [facility] as he was sleeping using only 1 pillow, he suddenly was woken up from sleep due to chest pain which by the time he went to [facility] was already resolved  Patient cannot recall just how long chest pain lasted and with these complaints, he was then brought to the ER  Here in the emergency room he was noted to have mild abdominal breathing  Otherwise, the patient can speak in long sentences  On a side note, the son also stated that the patient is somewhat more confused and less active mentally compared to the time that he was first admitted for exacerbation of heart failure  He is therefore concerned of any organic brain disease    The patient however does not have any izabella weakness or preferential movement  He does not have any numbness or tingling  Review of Systems:    Constitutional:  Denies fever or chills   Eyes:  Denies change in visual acuity   HENT:  Denies nasal congestion or sore throat   Respiratory:  Denies cough or shortness of breath   Cardiovascular:  Earlier with chest pain however currently resolved patient has bilateral leg pitting edema to level of lower calves  GI:  Denies abdominal pain, nausea, vomiting, bloody stools or diarrhea   :  Denies dysuria   Musculoskeletal:  Denies back pain or joint pain   Integument:  Denies rash   Neurologic:  Denies headache, focal weakness or sensory changes   Endocrine:  Denies polyuria or polydipsia   Lymphatic:  Denies swollen glands   Psychiatric:  Denies depression or anxiety     Past Medical and Surgical History:   Past Medical History:   Diagnosis Date    Chronic renal disease, stage III ([facility] [address] )     [facility]     Essential hypertension, benign     Essential hypertension, benign      Past Surgical History:   Procedure Laterality Date    CORONARY ARTERY BYPASS GRAFT  2012    x4       Meds/Allergies:  (Not in a hospital admission)      Allergies:    Allergies   Allergen Reactions    Loratadine     Nitrofurantoin     Penicillins     Phenazopyridine      History:  Marital Status: /Civil Union   Occupation:  Retired  Patient Pre-hospital Living Situation:  In [address] [place] [address]  Patient Pre-hospital Level of Mobility:  Ambulatory needing assistance  Patient Pre-hospital Diet Restrictions:  Cardia  Substance Use History:   Social History     Substance and Sexual Activity   Alcohol Use Not Currently     Social History     Tobacco Use   Smoking Status Never Smoker   Smokeless Tobacco Never Used     Social History     Substance and Sexual Activity   Drug Use No       Family History:  Family History   Problem Relation Age of Onset    No Known Problems Mother     No Known Problems Father        Physical Exam:     Vitals:   Blood Pressure: 117/71 (03/06/22 0100)  Pulse: 72 (03/06/22 0100)  Temperature: 98 2 °F (36 8 °C) (03/06/22 0018)  Temp Source: Tympanic (03/06/22 0018)  Respirations: 16 (03/06/22 0100)  SpO2: 96 % (03/06/22 0100)    Constitutional:  Well developed, well nourished, no acute distress, non-toxic appearance   Eyes:  PERRL, conjunctiva normal   HENT:  Atraumatic, external ears normal, nose normal, oropharynx moist, no pharyngeal exudates  Neck- normal range of motion, no tenderness, supple   Respiratory:  No respiratory distress, bronchial breath sounds without wheezes however crackles bilaterally at lower 1/4 fields  Cardiovascular:  Normal rate, normal rhythm, no murmurs, no gallops, no rubs   GI:  Soft, nondistended, normal bowel sounds, nontender, no organomegaly, no mass, no rebound, no guarding   :  No costovertebral angle tenderness   Musculoskeletal:  Pitting edema up to level of lower calves, no tenderness, no deformities  Back- no tenderness  Integument:  Well hydrated, no rash   Lymphatic:  No lymphadenopathy noted   Neurologic:  Awake, alert when spoken to but would drift attention, communicative, no cranial nerve deficits  No focal signs  Psychiatric:  Speech and behavior appropriate however slow to respond  Lab Results: I have personally reviewed pertinent reports  Results from last 7 days   Lab Units 03/06/22  0049   WBC Thousand/uL 9 36   HEMOGLOBIN g/dL 11 7*   HEMATOCRIT % 34 2*   PLATELETS Thousands/uL 145*   NEUTROS PCT % 74   LYMPHS PCT % 11*   MONOS PCT % 9   EOS PCT % 4     Results from last 7 days   Lab Units 03/06/22  0049   POTASSIUM mmol/L 4 1   CHLORIDE mmol/L 107   CO2 mmol/L 23   BUN mg/dL 49*   CREATININE mg/dL 2 23*   CALCIUM mg/dL 9 2               Imaging: I have personally reviewed pertinent reports  X-ray chest 1 view portable    Result Date: 2/26/2022  Narrative: CHEST INDICATION:   CHF   COMPARISON:  September 10, 2015 EXAM PERFORMED/VIEWS:  XR CHEST PORTABLE FINDINGS:  Prior sternotomy  EKG leads present  Device projecting over the left heart margin,, possibly leadless pacer  Cardiomediastinal silhouette appears unremarkable  Lungs show bilateral central pulmonary vascular distention and extensive airspace infiltrates in the mid and lower lung zones predominantly in a parahilar distribution  Probable small bilateral effusions present, right greater than left  No pneumothorax   Osseous structures appear within normal limits for patient age  Impression: Bilateral mid and lower lung zone infiltrate, central vascular congestion and small effusions  Correlate with clinical findings, suggesting pulmonary edema  Workstation performed: [id number]     VAS lower limb venous duplex study, complete bilateral    Result Date: 2/27/2022  Narrative:  [facility] REPORT CLINICAL: Indications: Patient presents with bilateral lower extremity calf pain  Operative History: 2012 CABG Risk Factors The patient has history of HTN, CKD and CAD  CONCLUSION:  Impression: RIGHT LOWER LIMB: No evidence of acute or chronic deep vein thrombosis  No evidence of superficial thrombophlebitis noted  Doppler evaluation shows a normal response to augmentation maneuvers  Popliteal, posterior tibial and anterior tibial arterial Doppler waveforms are triphasic  LEFT LOWER LIMB: No evidence of acute or chronic deep vein thrombosis  No evidence of superficial thrombophlebitis noted  Doppler evaluation shows a normal response to augmentation maneuvers  Popliteal, posterior tibial and anterior tibial arterial Doppler waveforms are triphasic  Technical findings posted in [place]: Electronically Signed by: Misha Mcmahon MD on 2022-02-27 09:58:47 PM    US bedside procedure    Result Date: 2/26/2022  Narrative: 5 [phone] [id number]  2 446 246 [id number] 45 1    Echo complete    Result Date: 2/27/2022  Narrative: [facility]  Left Ventricle: Left ventricular cavity size is dilated  The left ventricular ejection fraction is 25%  Systolic function is severely reduced  Wall motion is normal  There is severe global hypokinesis with regional variation  Diastolic function is abnormal   Left atrial filling pressure is elevated  Wall thickness is increased    Right Ventricle: Systolic function is normal    Left Atrium: The atrium is mildly dilated    Right Atrium: The atrium is mildly dilated    Aortic Valve: There is mild to moderate regurgitation    Mitral Valve: There is moderate regurgitation    Tricuspid Valve: There is mild to moderate regurgitation  The estimated right ventricular systolic pressure is 53 44 mmHg    Pericardium: There is a moderately sized left pleural effusion  ** Please Note: Dragon 360 Dictation voice to text software was used in the creation of this document   **

## 2022-03-06 NOTE — ASSESSMENT & PLAN NOTE
Since patient was admitted for heart failure last time, son notes that the patient is less mentally active and engaging  Questionable generalized ischemic brain as result of heart failure? Will get CT scan of head to look for any other organic brain disease    CT of head without contrast

## 2022-03-06 NOTE — PROGRESS NOTES
[address] [facility]  Progress Note - Ramesh Patterson 12/6/1926, 80 y o  male MRN: [id number]  Unit/Bed#: PPHP 726-01 Encounter: [id number]  Primary Care Provider: Nora De Luna DO   Date and time admitted to hospital: 3/6/2022 12:11 AM    [facility] Internal Medicine Post Admit Check:     Date of visit: 03/06/22    The patient was admitted earlier to the [facility] Internal Medicine Service  Please see initial intake history and physical for detailed admission history  This is a supplemental update following a post admit checkup  Subjective: Patient offers no complaints today  No further episodes of chest pain  Provided medical update to patient's son via telephone  Exam:   General: awake, alert, sitting up in chair, no acute distress   Heart: RRR without murmur   Lungs: CTA bilaterally   Abdomen: soft, nontender, nondistended   Extremities: no edema     Assessment and Plan:    * Other chest pain  Assessment & Plan  · Presented with single fleeting episode of chest pain, described as dull ache that woke him from sleep  Resolved within minutes and has not recurred  · KASSY score = 4  · Troponin negative x 3  EKG without ischemic changes compared to prior    · Appreciate cardiology inputs, no need for further workup at this time   · Continue home medical management for now with additional of low dose Imdur 15 mg daily     Chronic combined systolic and diastolic heart failure (HCC)  Assessment & Plan  Wt Readings from Last 3 Encounters:   03/01/22 58 1 kg (128 lb 1 4 oz)   12/08/21 59 9 kg (132 lb)   08/26/21 58 kg (127 lb 12 8 oz)     · Presented due to acute onset chest pain that awoke him from sleep, which resolved prior to arrival   · CXR with mild vascular congestion, formal read pending   · proBNP on admission 13,239 but improved most recent value 17,709 on 2/26  · Most recent echo last month: LVEF 70%, systolic function severely reduced, no R WMA, severe global hypokinesis, diastolic function abnormal, mild to moderate AR, moderate MR, mild-to-moderate TR with estimated PA pressure of 45 mmHg  · Continued on home dose diuretic currently: Lasix 20 mg BID   · Continue with home dose metoprolol  Previously on Diovan 40 mg daily, which was discontinued on last hospitalization  · Appreciate caridology inputs, no need for IV diuresis at this time   · Monitor daily weights, intake and output    Cognitive decline  Assessment & Plan  · Patient son reported increased confusion since prior hospitalization  · CT head without acute intracranial abnormality   · No signs or symptoms of acute infectious process  No focal neurologic deficits on exam   · Suspect age related cognitive decline exacerbated by recent hospitalization   · Supportive cares, frequent reorientation, delirium precautions     CKD (chronic kidney disease) stage 4, GFR 15-29 ml/min [facility])  Assessment & Plan  Lab Results   Component Value Date    EGFR 24 03/06/2022    EGFR 24 03/06/2022    EGFR 22 03/04/2022    CREATININE 2 18 (H) 03/06/2022    CREATININE 2 23 (H) 03/06/2022    CREATININE 2 40 (H) 03/04/2022   · Follows with nephrology outpatient  · Creatinine currently at baseline, approximately 1 9-2 3  · Defer diuretic management to cardiology   · Avoid nephrotoxins and hypotension   · Continue to monitor BMP     Hypercholesterolemia  Assessment & Plan  · On pravastatin      Hypertension  Assessment & Plan  · BP acceptable, monitor routinely   · Continue home dose metoprolol   · Home dose valsartan currently on hold     Coronary artery disease  Assessment & Plan  · Presenting with one episode of fleeting chest pain, see plan above   · Continue with aspirin, statin, Plavix, BB     Vera Li PA-C

## 2022-03-06 NOTE — ASSESSMENT & PLAN NOTE
· Presenting with one episode of fleeting chest pain, see plan above   · Continue with aspirin, statin, Plavix, BB

## 2022-03-06 NOTE — ASSESSMENT & PLAN NOTE
· Patient son reported increased confusion since prior hospitalization  · CT head without acute intracranial abnormality   · No signs or symptoms of acute infectious process   No focal neurologic deficits on exam   · Suspect age related cognitive decline exacerbated by recent hospitalization   · Supportive cares, frequent reorientation, delirium precautions

## 2022-03-06 NOTE — ASSESSMENT & PLAN NOTE
· Presented with single fleeting episode of chest pain, described as dull ache that woke him from sleep  Resolved within minutes and has not recurred  · KASSY score = 4  · Troponin negative x 3  EKG without ischemic changes compared to prior    · Appreciate cardiology inputs, no need for further workup at this time   · Continue home medical management for now with additional of low dose Imdur 15 mg daily

## 2022-03-06 NOTE — ED ATTENDING ATTESTATION
3/6/2022  IWanda DO, saw and evaluated the patient  I have discussed the patient with the resident/non-physician practitioner and agree with the resident's/non-physician practitioner's findings, Plan of Care, and MDM as documented in the resident's/non-physician practitioner's note, except where noted  All available labs and Radiology studies were reviewed  I was present for key portions of any procedure(s) performed by the resident/non-physician practitioner and I was immediately available to provide assistance  At this point I agree with the current assessment done in the Emergency Department  I have conducted an independent evaluation of this patient a history and physical is as follows:    Patient is a 26-year-old male history of coronary disease status post CABG, recently diagnosed CHF, hospitalized February 26th through March 1, 2022 with CHF, 25% EF  Says this evening he woke up to go to the bathroom, 1 walking noticed that he had some mild chest discomfort, going into the neck  Lasted a few minutes and resolved  No associated nausea or vomiting or diaphoresis  Still has slight dyspnea on exertion but otherwise doing much better than when discharge from hospital   He is currently asymptomatic  No fever, no chills, no cough, no chest pain currently  When the discomfort was there it was not pleuritic in nature, not knife-like, ripping, or tearing  Called EMS, was given aspirin    General:  Patient is well-appearing  Head:  Atraumatic  Eyes:  Conjunctiva pink  ENT:  Mucous membranes are moist  Neck:  Supple  Cardiac:  S1-S2, without murmurs  Lungs:  Very trace bibasilar rales, faint wheezing    No rhonchi or accessory muscle usage  Abdomen:  Soft, nontender, normal bowel sounds, no CVA tenderness, no tympany, no rigidity, no guarding  Extremities:  Normal range of motion, no pedal edema or calf asymmetry, radial pulses are equal and symmetric bilaterally  Neurologic:  Awake, fluent speech, normal comprehension, AAOx3  Skin:  Pink warm and dry  Psychiatric:  Alert, pleasant, cooperative      ED Course  ED Course as of 03/06/22 0146   Perry Razo Mar 06, 2022   0145 ECG interpreted me, sinus rhythm, rate of 74 , left bundle branch block acute change from February 28, 2022     Labs Reviewed   CBC AND DIFFERENTIAL - Abnormal       Result Value Ref Range Status    WBC 9 36  4 31 - 10 16 Thousand/uL Final    RBC 3 60 (*) 3 88 - 5 62 Million/uL Final    Hemoglobin 11 7 (*) 12 0 - 17 0 g/dL Final    Hematocrit 34 2 (*) 36 5 - 49 3 % Final    MCV 95  82 - 98 fL Final    MCH 32 5  26 8 - 34 3 pg Final    MCHC 34 2  31 4 - 37 4 g/dL Final    RDW 14 6  11 6 - 15 1 % Final    MPV 12 0  8 9 - 12 7 fL Final    Platelets 294 (*) 971 - 390 Thousands/uL Final    nRBC 0  /100 WBCs Final    Neutrophils Relative 74  43 - 75 % Final    Immat GRANS % 1  0 - 2 % Final    Lymphocytes Relative 11 (*) 14 - 44 % Final    Monocytes Relative 9  4 - 12 % Final    Eosinophils Relative 4  0 - 6 % Final    Basophils Relative 1  0 - 1 % Final    Neutrophils Absolute 7 00  1 85 - 7 62 Thousands/µL Final    Immature Grans Absolute 0 05  0 00 - 0 20 Thousand/uL Final    Lymphocytes Absolute 1 01  0 60 - 4 47 Thousands/µL Final    Monocytes Absolute 0 80  0 17 - 1 22 Thousand/µL Final    Eosinophils Absolute 0 39  0 00 - 0 61 Thousand/µL Final    Basophils Absolute 0 11 (*) 0 00 - 0 10 Thousands/µL Final   BASIC METABOLIC PANEL - Abnormal    Sodium 135 (*) 136 - 145 mmol/L Final    Potassium 4 1  3 5 - 5 3 mmol/L Final    Chloride 107  100 - 108 mmol/L Final    CO2 23  21 - 32 mmol/L Final    ANION GAP 5  4 - 13 mmol/L Final    BUN 49 (*) 5 - 25 mg/dL Final    Creatinine 2 23 (*) 0 60 - 1 30 mg/dL Final    Comment: Standardized to IDMS reference method    Glucose 117  65 - 140 mg/dL Final    Comment: If the patient is fasting, the ADA then defines impaired fasting glucose as > 100 mg/dL and diabetes as > or equal to 123 mg/dL    Specimen collection should occur prior to Sulfasalazine administration due to the potential for falsely depressed results  Specimen collection should occur prior to Sulfapyridine administration due to the potential for falsely elevated results  Calcium 9 2  8 3 - 10 1 mg/dL Final    eGFR 24  ml/min/1 73sq m Final    Narrative:     [place] guidelines for Chronic Kidney Disease (CKD):     Stage 1 with normal or high GFR (GFR > 90 mL/min/1 73 square meters)    Stage 2 Mild CKD (GFR = 60-89 mL/min/1 73 square meters)    Stage 3A Moderate CKD (GFR = 45-59 mL/min/1 73 square meters)    Stage 3B Moderate CKD (GFR = 30-44 mL/min/1 73 square meters)    Stage 4 Severe CKD (GFR = 15-29 mL/min/1 73 square meters)    Stage 5 End Stage CKD (GFR <15 mL/min/1 73 square meters)  Note: GFR calculation is accurate only with a steady state creatinine   NT-BNP PRO (BRAIN NATRIURETIC PEPTIDE) - Abnormal    NT-proBNP 13,239 (*) <450 pg/mL Final   HS TROPONIN I 0HR - Normal    hs TnI 0hr 15  "Refer to ACS Flowchart"- see link ng/L Final    Comment:                                              Initial (time 0) result  If >=50 ng/L, Myocardial injury suggested ;  Type of myocardial injury and treatment strategy  to be determined  If 5-49 ng/L, a delta result at 2 hours and or 4 hours will be needed to further evaluate  If <4 ng/L, and chest pain has been >3 hours since onset, patient may qualify for discharge based on the HEART score in the ED  If <5 ng/L and <3hours since onset of chest pain, a delta result at 2 hours will be needed to further evaluate  HS Troponin 99th Percentile URL of a Health Population=12 ng/L with a 95% Confidence Interval of 8-18 ng/L  Second Troponin (time 2 hours)  If calculated delta >= 20 ng/L,  Myocardial injury suggested ; Type of myocardial injury and treatment strategy to be determined  If 5-49 ng/L and the calculated delta is 5-19 ng/L, consult medical service for evaluation  Continue evaluation for ischemia on ecg and other possible etiology and repeat hs troponin at 4 hours  If delta is <5 ng/L at 2 hours, consider discharge based on risk stratification via the HEART score (if in ED), or KASSY risk score in IP/Observation  HS Troponin 99th Percentile URL of a Health Population=12 ng/L with a 95% Confidence Interval of 8-18 ng/L    HS TROPONIN I 2HR - Normal    hs TnI 2hr 15  "Refer to ACS Flowchart"- see link ng/L Final    Comment:                                              Initial (time 0) result  If >=50 ng/L, Myocardial injury suggested ;  Type of myocardial injury and treatment strategy  to be determined  If 5-49 ng/L, a delta result at 2 hours and or 4 hours will be needed to further evaluate  If <4 ng/L, and chest pain has been >3 hours since onset, patient may qualify for discharge based on the HEART score in the ED  If <5 ng/L and <3hours since onset of chest pain, a delta result at 2 hours will be needed to further evaluate  HS Troponin 99th Percentile URL of a Health Population=12 ng/L with a 95% Confidence Interval of 8-18 ng/L  Second Troponin (time 2 hours)  If calculated delta >= 20 ng/L,  Myocardial injury suggested ; Type of myocardial injury and treatment strategy to be determined  If 5-49 ng/L and the calculated delta is 5-19 ng/L, consult medical service for evaluation  Continue evaluation for ischemia on ecg and other possible etiology and repeat hs troponin at 4 hours  If delta is <5 ng/L at 2 hours, consider discharge based on risk stratification via the HEART score (if in ED), or KASSY risk score in IP/Observation  HS Troponin 99th Percentile URL of a Health Population=12 ng/L with a 95% Confidence Interval of 8-18 ng/L      Delta 2hr hsTnI 0  <20 ng/L Final   PLATELET COUNT - Normal    Platelets 413  962 - 390 Thousands/uL Final    MPV 11 5  8 9 - 12 7 fL Final   HS TROPONIN I 4HR   CBC AND DIFFERENTIAL   COMPREHENSIVE METABOLIC PANEL MAGNESIUM     Patient being admitted for ACS workup    Critical Care Time  Procedures

## 2022-03-06 NOTE — ASSESSMENT & PLAN NOTE
Patient awakened from sleep with chest pain; currently with mild abdominal breathing  Chest x-ray consistent with heart failure although much improved than previous in the last admission  Will get troponins x3; first one is 15  This is improved from previous troponin in last admission of 27  May benefit from diuresis Lasix 20 mg x 1 intravenous  But will continue with Lasix 20 mg twice daily by mouth  Daily weights and input output; cardiology consult  May need to increase Lasix dosing? Continue metoprolol

## 2022-03-06 NOTE — PLAN OF CARE
Problem: OCCUPATIONAL THERAPY ADULT  Goal: Performs self-care activities at highest level of function for planned discharge setting  See evaluation for individualized goals  Description: Treatment Interventions: ADL retraining,Functional transfer training,UE strengthening/ROM,Endurance training,Cognitive reorientation,Compensatory technique education,Energy conservation,Activityengagement          See flowsheet documentation for full assessment, interventions and recommendations  Note: Limitation: Decreased ADL status,Decreased Safe judgement during ADL,Decreased cognition,Decreased endurance,Decreased self-care trans,Decreased high-level ADLs  Prognosis: Fair  Assessment: Pt is a 80 y o  male who was admitted to [facility] on 3/6/2022 with Other chest pain, CHF, confusion, HTN, CKD   Pt's problem list also includes PMH of  has a past medical history of Chronic renal disease, stage III ([facility] [address] ), Chronic renal disease, stage III ([facility] [address] ), Essential hypertension, benign, and Essential hypertension, benign    At baseline pt was completing I with Adl's, assistance with IaDL's  Pt lives alone at [address] [place] [address] with assistance from facility staff  Currently pt requires min a  for overall ADLS and min a for Cg/CS with RW for functional mobility/transfers  Pt currently presents with impairments in the following categories -steps to enter environment, difficulty performing ADLS, difficulty performing IADLS , limited insight into deficits and compliance activity tolerance   These impairments, as well as pt's fatigue and risk for falls  limit pt's ability to safely engage in all baseline areas of occupation, includingbathing, dressing, toileting, functional mobility/transfers, community mobility, laundry , driving, house maintenance, medication management, meal prep, social participation  and leisure activities  The patient's raw score on the AM-PAC Daily Activity inpatient short form is 20, standardized score is 42 03, greater than 39 4  Patients at this level are likely to benefit from discharge to home  Please refer to the recommendation of the Occupational Therapist for safe discharge planning  From OT standpoint, recommend home OT with increased facility support upon D/C  OT will continue to follow to address the below stated goals  OT Discharge Recommendation: Home with home health rehabilitation (If [facility] able to provide appropriate assistance)  OT - OK to Discharge:  Yes

## 2022-03-06 NOTE — ASSESSMENT & PLAN NOTE
Wt Readings from Last 3 Encounters:   03/01/22 58 1 kg (128 lb 1 4 oz)   12/08/21 59 9 kg (132 lb)   08/26/21 58 kg (127 lb 12 8 oz)     · Presented due to acute onset chest pain that awoke him from sleep, which resolved prior to arrival   · CXR with mild vascular congestion, formal read pending   · proBNP on admission 13,239 but improved most recent value 17,709 on 2/26  · Most recent echo last month: LVEF 25%, systolic function severely reduced, no R WMA, severe global hypokinesis, diastolic function abnormal, mild to moderate AR, moderate MR, mild-to-moderate TR with estimated PA pressure of 45 mmHg  · Continued on home dose diuretic currently: Lasix 20 mg BID   · Continue with home dose metoprolol  Previously on Diovan 40 mg daily, which was discontinued on last hospitalization     · Appreciate caridology inputs, no need for IV diuresis at this time   · Monitor daily weights, intake and output

## 2022-03-06 NOTE — PLAN OF CARE
Problem: MOBILITY - ADULT  Goal: Maintain or return to baseline ADL function  Description: INTERVENTIONS:  -  Assess patient's ability to carry out ADLs; assess patient's baseline for ADL function and identify physical deficits which impact ability to perform ADLs (bathing, care of mouth/teeth, toileting, grooming, dressing, etc )  - Assess/evaluate cause of self-care deficits   - Assess range of motion  - Assess patient's mobility; develop plan if impaired  - Assess patient's need for assistive devices and provide as appropriate  - Encourage maximum independence but intervene and supervise when necessary  - Involve family in performance of ADLs  - Assess for home care needs following discharge   - Consider OT consult to assist with ADL evaluation and planning for discharge  - Provide patient education as appropriate  Outcome: Progressing  Goal: Maintains/Returns to pre admission functional level  Description: INTERVENTIONS:  - Perform BMAT or MOVE assessment daily    - Set and communicate daily mobility goal to care team and patient/family/caregiver     - Collaborate with rehabilitation services on mobility goals if consulted  - Out of bed for toileting  - Record patient progress and toleration of activity level   Outcome: Progressing     Problem: CARDIOVASCULAR - ADULT  Goal: Maintains optimal cardiac output and hemodynamic stability  Description: INTERVENTIONS:  - Monitor I/O, vital signs and rhythm  - Monitor for S/S and trends of decreased cardiac output  - Administer and titrate ordered vasoactive medications to optimize hemodynamic stability  - Assess quality of pulses, skin color and temperature  - Assess for signs of decreased coronary artery perfusion  - Instruct patient to report change in severity of symptoms  Outcome: Progressing  Goal: Absence of cardiac dysrhythmias or at baseline rhythm  Description: INTERVENTIONS:  - Continuous cardiac monitoring, vital signs, obtain 12 lead EKG if ordered  - Administer antiarrhythmic and heart rate control medications as ordered  - Monitor electrolytes and administer replacement therapy as ordered  Outcome: Progressing

## 2022-03-06 NOTE — ASSESSMENT & PLAN NOTE
· BP acceptable, monitor routinely   · Continue home dose metoprolol   · Home dose valsartan currently on hold

## 2022-03-06 NOTE — PLAN OF CARE
Problem: PHYSICAL THERAPY ADULT  Goal: Performs mobility at highest level of function for planned discharge setting  See evaluation for individualized goals  Description: Treatment/Interventions: Functional transfer training,LE strengthening/ROM,Therapeutic exercise,Endurance training,Patient/family training,Equipment eval/education,Bed mobility,Gait training,Spoke to nursing          See flowsheet documentation for full assessment, interventions and recommendations  Note: Prognosis: Good  Problem List: Impaired balance,Decreased mobility,Decreased cognition,Decreased endurance  Assessment: Pt is 80 y o  male seen for PT evaluation s/p admit to [facility] on 3/6/2022 w/ Other chest pain  PT consulted to assess pt's functional mobility and d/c needs  Order placed for PT eval and tx, w/ up w/ A order  Comorbidities affecting pt's physical performance at time of assessment include: CKD, CHF, CAD, HTN, cognitive decline, chronic renal disease  PTA, pt was independent w/ all functional mobility w/ RW and resides at [facility]  Personal factors affecting pt at time of IE include: ambulating w/ assistive device, decreased cognition, limited home support, limited insight into impairments and inability to perform IADLs  Please find objective findings from PT assessment regarding body systems outlined above with impairments and limitations including weakness, impaired balance, decreased endurance, gait deviations, decreased activity tolerance, decreased functional mobility tolerance, fall risk and decreased cognition  Pt performed STS at supervision and ambulated 300ft w/ RW at [facility]  The following objective measures performed on IE also reveal limitations: AM-PAC 6-Clicks: 14/25   Pt's clinical presentation is currently unstable/unpredictable seen in pt's presentation of recent admission for chest pain requiring medical attention, recent decline in function as compared to baseline, fall risk, cognitive deficits  Pt to benefit from continued PT tx to address deficits as defined above and maximize level of functional independent mobility and consistency  From PT/mobility standpoint, recommendation at time of d/c would be no rehabilitation needs pending progress in order to facilitate return to PLOF  Barriers to Discharge: Decreased caregiver support        PT Discharge Recommendation: No rehabilitation needs (return to [facility])          See flowsheet documentation for full assessment

## 2022-03-06 NOTE — ASSESSMENT & PLAN NOTE
Lab Results   Component Value Date    EGFR 24 03/06/2022    EGFR 24 03/06/2022    EGFR 22 03/04/2022    CREATININE 2 18 (H) 03/06/2022    CREATININE 2 23 (H) 03/06/2022    CREATININE 2 40 (H) 03/04/2022   · Follows with nephrology outpatient  · Creatinine currently at baseline, approximately 1 9-2 3  · Defer diuretic management to cardiology   · Avoid nephrotoxins and hypotension   · Continue to monitor BMP

## 2022-03-06 NOTE — ASSESSMENT & PLAN NOTE
Wt Readings from Last 3 Encounters:   03/01/22 58 1 kg (128 lb 1 4 oz)   12/08/21 59 9 kg (132 lb)   08/26/21 58 kg (127 lb 12 8 oz)     Please see plans regarding chest pain

## 2022-03-06 NOTE — ASSESSMENT & PLAN NOTE
Lab Results   Component Value Date    EGFR 24 03/06/2022    EGFR 22 03/04/2022    EGFR 23 03/01/2022    CREATININE 2 23 (H) 03/06/2022    CREATININE 2 40 (H) 03/04/2022    CREATININE 2 25 (H) 03/01/2022   Will continue to watch creatinine  May need to diurese patient but will leave to Cardiology outpatient diuretic dosing

## 2022-03-06 NOTE — CONSULTS
Consultation - Cardiology Team One  Mark London 80 y o  male MRN: [id number]  Unit/Bed#: [facility] 726-01 Encounter: [id number]    Inpatient consult to Cardiology  Consult performed by: SCOTTIE Kam  Consult ordered by: Grayson Scott MD      Physician Requesting Consult: Bridgett Ventura MD  Reason for Consult / Principal Problem: Chest pain    Assessment/ Plan    1  Chest pain  Somewhat limited historian but describes dull aching chest pain that woke him from sleep last night  Lasted a few minutes before resolving on its own  No associated symptoms that patient noticed  Troponin negative x 3  ECG- nonischemic- NSR with IVCD, PVCs, non specific T wave abnormality  Currently pain free  Continue medical management of CAD- ASA, Plavix, statin, and beta blocker  Add 15 mg Imdur to help prevent recurrent chest pain and admissions  No further cardiac testing needed at this time  Likely stable for d/c home later today from cardiology perspective  2  Chronic combined systolic and diastolic CHF  Euvolemic on exam  Continue furosemide 20 mg BID  Weight: none from this admission - needs standing weight today, discussed with RN  Dry weight: 128 lb  No I/Os documented    3  CAD s/p CABG/ICM, 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%  Continue medical management with ASA, Plavix, statin, and beta blocker  GDMT- continue metoprolol  Diovan stopped last hospital stay due to renal function  Per primary cardiologist's notes, AICD would not be ideal  See plan for #1    4  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, moderate TR, 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  Continue medical management, diuretics as above    6  HTN- stable, average /80    7  HLD- continue statin    8  CKD- Creatinine stable, 2 18    9  Confusion-  CT head showed chronic microangiopathy, not acute pathology   Management per primary team     History of Present Illness   HPI: Mark London is a 80y o  year old male with CAD s/p CABG, ICM with EF 25%, valvular heart disease- 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, moderate TR and moderate to severe MR, HTN, HLD, and CKD 3b  He follows with cardiologist Dr Ebonie Pimentel and was seen in the office in December 2021  He was recently admitted from 2/26-3/1 with acute CHF in the setting of a higher sodium diet  He was discharged to an assisted living facility on furosemide 20 mg BID at a weight of 128 lb  He woke up last night with dull chest pain that he thinks only lasted a few minutes  He does not recall having any associated symptoms of nausea, diaphoresis, or shortness of breath  He tells me the facility gives him his medications and he has not noticed any leg swelling or increased SOB since his discharge  Troponins are negative x 3  ECG non-ischemic  BP is controlled  NT proBNP 13,239 (from 17,709 on 2/26) and CXR shows improved vascular congestion compared to study on 2/26  His renal function is stable and he has been continued on his home medication regimen  Cardiology has been consulted for further evaluation and management of his chest pain  EKG reviewed personally: NSR with non specific IVCD, PVCs, non specific T wave abnormality- unchanged from previous  Telemetry reviewed personally: n/a    Review of Systems   Constitutional: Negative for chills, decreased appetite, malaise/fatigue and weight gain  Cardiovascular: Positive for chest pain  Negative for dyspnea on exertion, leg swelling, orthopnea, palpitations and syncope  Respiratory: Negative for cough, shortness of breath, sleep disturbances due to breathing and sputum production  Gastrointestinal: Negative for bloating and nausea  Neurological: Negative for dizziness, light-headedness and weakness  Psychiatric/Behavioral: Positive for memory loss (short term)  All other systems reviewed and are negative      Historical Information   Past Medical History:   Diagnosis Date    Chronic renal disease, stage III [facility] [address] [facility]     Chronic renal disease, stage III ([address] )     Essential hypertension, benign     Essential hypertension, benign      Past Surgical History:   Procedure Laterality Date    CORONARY ARTERY BYPASS GRAFT  2012    x4     Social History     Substance and Sexual Activity   Alcohol Use Not Currently     Social History     Substance and Sexual Activity   Drug Use No     Social History     Tobacco Use   Smoking Status Never Smoker   Smokeless Tobacco Never Used     Family History:   Family History   Problem Relation Age of Onset    No Known Problems Mother     No Known Problems Father        Meds/Allergies   all current active meds have been reviewed and current meds:   Current Facility-Administered Medications   Medication Dose Route Frequency    acetaminophen (TYLENOL) tablet 650 mg  650 mg Oral Q6H PRN    aluminum-magnesium hydroxide-simethicone (MYLANTA) oral suspension 30 mL  30 mL Oral Q6H PRN    aspirin (ECOTRIN LOW STRENGTH) EC tablet 81 mg  81 mg Oral Daily    calcium carbonate (OYSTER SHELL,OSCAL) 500 mg tablet 1 tablet  1 tablet Oral Daily With Breakfast    clopidogrel (PLAVIX) tablet 75 mg  75 mg Oral Daily    docusate sodium (COLACE) capsule 100 mg  100 mg Oral BID PRN    furosemide (LASIX) tablet 20 mg  20 mg Oral BID    heparin (porcine) subcutaneous injection 5,000 Units  5,000 Units Subcutaneous Q8H [address]    metoprolol tartrate (LOPRESSOR) tablet 50 mg  50 mg Oral BID    multivitamin stress formula tablet 1 tablet  1 tablet Oral Daily    ondansetron (ZOFRAN) injection 4 mg  4 mg Intravenous Q6H PRN    pravastatin (PRAVACHOL) tablet 20 mg  20 mg Oral Daily          Allergies   Allergen Reactions    Loratadine     Nitrofurantoin     Penicillins     Phenazopyridine      Objective   Vitals: Blood pressure 124/82, pulse 69, temperature 98 2 °F (36 8 °C), temperature source Tympanic, resp  rate 16, SpO2 94 %  , There is no height or weight on file to calculate BMI ,     Systolic (19FTN), HGT:703 , Min:117 , GDN:174     Diastolic (77BSB), ATS:41, Min:71, Max:85    No intake or output data in the 24 hours ending 03/06/22 0819  Wt Readings from Last 3 Encounters:   03/01/22 58 1 kg (128 lb 1 4 oz)   12/08/21 59 9 kg (132 lb)   08/26/21 58 kg (127 lb 12 8 oz)     Invasive Devices  Report    Peripheral Intravenous Line            Peripheral IV 03/06/22 Left Antecubital <1 day              Physical Exam  Vitals reviewed  Constitutional:       General: He is not in acute distress  Comments: Elderly, frail appearing male sitting up in chair in no acute distress on room air  Neck:      Vascular: No hepatojugular reflux or JVD  Cardiovascular:      Rate and Rhythm: Normal rate and regular rhythm  Heart sounds: Normal heart sounds  No murmur heard  No friction rub  No gallop  Pulmonary:      Effort: Pulmonary effort is normal  No respiratory distress  Breath sounds: No rales  Comments: Clear, room air  Abdominal:      General: Bowel sounds are normal  There is no distension  Palpations: Abdomen is soft  Tenderness: There is no abdominal tenderness  Musculoskeletal:         General: No tenderness  Cervical back: Neck supple  Right lower leg: No edema  Left lower leg: No edema  Skin:     General: Skin is warm and dry  Findings: No erythema  Neurological:      Mental Status: He is alert and oriented to person, place, and time        Comments: Forgetful, limited historian   Psychiatric:         Mood and Affect: Mood normal          LABORATORY RESULTS:      CBC with diff:   Results from last 7 days   Lab Units 03/06/22 0444 03/06/22  0248 03/06/22  0049   WBC Thousand/uL 8 19  --  9 36   HEMOGLOBIN g/dL 11 2*  --  11 7*   HEMATOCRIT % 33 1*  --  34 2*   MCV fL 96  --  95   PLATELETS Thousands/uL 143* 157 145*   MCH pg 32 6  --  32 5   MCHC g/dL 33 8  --  34 2   RDW % 14 6  --  14 6   MPV fL 11 4 11 5 12 0   NRBC AUTO /100 WBCs 0  --  0     CMP:  Results from last 7 days   Lab Units 03/06/22  0444 03/06/22  0049 03/04/22  1729 03/01/22  0451 02/28/22  0501   POTASSIUM mmol/L 4 2 4 1 4 4 4 8 3 9   CHLORIDE mmol/L 108 107 103 101 104   CO2 mmol/L 23 23 24 27 23   BUN mg/dL 45* 49* 46* 42* 44*   CREATININE mg/dL 2 18* 2 23* 2 40* 2 25* 2 26*   CALCIUM mg/dL 8 5 9 2 8 9 8 7 8 5   AST U/L 56*  --   --   --   --    ALT U/L 112*  --   --   --   --    ALK PHOS U/L 94  --   --   --   --    EGFR ml/min/1 73sq m 24 24 22 23 23     BMP:  Results from last 7 days   Lab Units 03/06/22  0444 03/06/22  0049 03/04/22  1729 03/01/22  0451 02/28/22  0501   POTASSIUM mmol/L 4 2 4 1 4 4 4 8 3 9   CHLORIDE mmol/L 108 107 103 101 104   CO2 mmol/L 23 23 24 27 23   BUN mg/dL 45* 49* 46* 42* 44*   CREATININE mg/dL 2 18* 2 23* 2 40* 2 25* 2 26*   CALCIUM mg/dL 8 5 9 2 8 9 8 7 8 5     Lab Results   Component Value Date    CREATININE 2 18 (H) 03/06/2022    CREATININE 2 23 (H) 03/06/2022    CREATININE 2 40 (H) 03/04/2022     Lab Results   Component Value Date    NTBNP 13,239 (H) 03/06/2022    NTBNP 17,709 (H) 02/26/2022      Results from last 7 days   Lab Units 03/06/22  0444   MAGNESIUM mg/dL 2 4         Lipid Profile:   No results found for: CHOL  Lab Results   Component Value Date    HDL 51 02/26/2022     Lab Results   Component Value Date    LDLCALC 86 02/26/2022     Lab Results   Component Value Date    TRIG 67 02/26/2022     Imaging: I have personally reviewed pertinent reports  and I have personally reviewed pertinent films in PACS  X-ray chest 1 view portable    Result Date: 2/26/2022  Narrative: CHEST INDICATION:   CHF  COMPARISON:  September 10, 2015 EXAM PERFORMED/VIEWS:  XR CHEST PORTABLE FINDINGS:  Prior sternotomy  EKG leads present  Device projecting over the left heart margin,, possibly leadless pacer  Cardiomediastinal silhouette appears unremarkable  Lungs show bilateral central pulmonary vascular distention and extensive airspace infiltrates in the mid and lower lung zones predominantly in a parahilar distribution    Probable small bilateral effusions present, right greater than left  No pneumothorax   Osseous structures appear within normal limits for patient age  Impression: Bilateral mid and lower lung zone infiltrate, central vascular congestion and small effusions  Correlate with clinical findings, suggesting pulmonary edema  Workstation performed: [id number]     CT head wo contrast    Result Date: 3/6/2022  Narrative: CT BRAIN - WITHOUT CONTRAST INDICATION:   Delirium brain fog after admitted with heart failure, confusion  COMPARISON:  CT and MRI dated 10/15/2013  TECHNIQUE:  CT examination of the brain was performed  In addition to axial images, sagittal and coronal 2D reformatted images were created and submitted for interpretation  Radiation dose length product (DLP) for this visit:  873 13 mGy-cm   This examination, like all CT scans performed in the [facility], was performed utilizing techniques to minimize radiation dose exposure, including the use of iterative  reconstruction and automated exposure control  IMAGE QUALITY:  Diagnostic  FINDINGS: PARENCHYMA: Decreased attenuation is noted in periventricular and subcortical white matter demonstrating an appearance that is statistically most likely to represent mild microangiopathic change  No CT signs of acute infarction  No intracranial mass, mass effect or midline shift  No acute parenchymal hemorrhage  VENTRICLES AND EXTRA-AXIAL SPACES:  Age appropriate volume loss  No hydrocephalus  VISUALIZED ORBITS AND PARANASAL SINUSES:  Bilateral lens replacement  Left maxillary sinus retention cyst  CALVARIUM AND EXTRACRANIAL SOFT TISSUES:  Normal      Impression: No acute intracranial pathology  Chronic microangiopathy  Workstation performed: [id number]     VAS lower limb venous duplex study, complete bilateral    Result Date: 2/27/2022  Narrative:  [facility] REPORT CLINICAL: Indications: Patient presents with bilateral lower extremity calf pain   Operative History: 2012 CABG Risk Factors The patient has history of HTN, CKD and CAD  CONCLUSION:  Impression: RIGHT LOWER LIMB: No evidence of acute or chronic deep vein thrombosis  No evidence of superficial thrombophlebitis noted  Doppler evaluation shows a normal response to augmentation maneuvers  Popliteal, posterior tibial and anterior tibial arterial Doppler waveforms are triphasic  LEFT LOWER LIMB: No evidence of acute or chronic deep vein thrombosis  No evidence of superficial thrombophlebitis noted  Doppler evaluation shows a normal response to augmentation maneuvers  Popliteal, posterior tibial and anterior tibial arterial Doppler waveforms are triphasic  Technical findings posted in [employer]: Electronically Signed by: Constance Bacon MD on 2022-02-27 09:58:47 PM    US bedside procedure    Result Date: 2/26/2022  Narrative: 5 [phone] [id number]  2 446 246 [id number] 45 1    Echo complete    Result Date: 2/27/2022  Narrative: Luxora Bea  Left Ventricle: Left ventricular cavity size is dilated  The left ventricular ejection fraction is 25%  Systolic function is severely reduced  Wall motion is normal  There is severe global hypokinesis with regional variation  Diastolic function is abnormal   Left atrial filling pressure is elevated  Wall thickness is increased    Right Ventricle: Systolic function is normal    Left Atrium: The atrium is mildly dilated    Right Atrium: The atrium is mildly dilated    Aortic Valve: There is mild to moderate regurgitation    Mitral Valve: There is moderate regurgitation    Tricuspid Valve: There is mild to moderate regurgitation  The estimated right ventricular systolic pressure is 52 34 mmHg    Pericardium: There is a moderately sized left pleural effusion  Thank you for allowing us to participate in this patient's care  This pt will follow up with Dr Cindy Shankar once discharged  Counseling / Coordination of Care  Total floor / unit time spent today 45 minutes    Greater than 50% of total time was spent with the patient and / or family counseling and / or coordination of care  A description of the counseling / coordination of care: Review of history, current assessment, development of a plan  Code Status: Level 3 - DNAR and DNI    ** Please Note: Dragon 360 Dictation voice to text software may have been used in the creation of this document   **

## 2022-03-06 NOTE — PHYSICAL THERAPY NOTE
Physical Therapy Evaluation     Patient's Name: Nelsy Isaac    Admitting Diagnosis  Chest pain [R07 9]  Atypical chest pain [R07 89]  Acute on chronic HFrEF (heart failure with reduced ejection fraction) ([facility]) [I50 23]  Coronary artery disease involving native heart, unspecified vessel or lesion type, unspecified whether angina present [I25 10]    Problem List  Patient Active Problem List   Diagnosis    CKD (chronic kidney disease) stage 4, GFR 15-29 ml/min ([facility])    Benign hypertensive CKD, stage 1-4 or unspecified chronic kidney disease    Chronic kidney disease-mineral and bone disorder    Chronic combined systolic and diastolic heart failure ([facility])    Chronic renal disease, stage IV ([facility] [address] )    Coronary artery disease    Hypertension    Hypercholesterolemia    PVC's (premature ventricular contractions)    Other chest pain    Cognitive decline       Past Medical History  Past Medical History:   Diagnosis Date    [facility]     Chronic renal disease, stage III ([facility] [address] )     Essential hypertension, benign     Essential hypertension, benign        Past Surgical History  Past Surgical History:   Procedure Laterality Date    CORONARY ARTERY BYPASS GRAFT  2012    x4        03/06/22 1030   PT Last Visit   PT Visit Date 03/06/22   Note Type   Note type Evaluation   Pain Assessment   Pain Assessment Tool 0-10   Pain Score No Pain   Restrictions/Precautions   Weight Bearing Precautions Per Order No   Other Precautions Cognitive; Chair Alarm; Fall Risk   Home Living   Type of Home   ([employer])   Home Layout One level   [facility] [address] [place] [address] chair   Home Equipment Walker   Additional Comments Pt resides at [facility] (unsure if ILF or [facility])  Pt reports ambulating w/ RW PTA   Prior Function   Level of [place] Independent with ADLs and functional mobility   Lives With Alone; Facility staff   Receives Help From   (facility staff)   ADL Assistance Independent   IADLs Needs assistance   Falls in the last 6 months 0   Comments Pt reports he is IND w/ ADLs and ambulates to dining castro   General   Family/Caregiver Present No   Cognition   Overall Cognitive Status Impaired   Arousal/Participation Alert   Orientation Level Oriented to person;Oriented to place;Oriented to time   Following Commands Follows one step commands with increased time or repetition   Comments Pt very pleasant and cooperative  Pt is a poor historian and has difficulty remembering information regarding his residence and family   Subjective   Subjective Pt seated OOB and agreeable to PT evaluation   RLE Assessment   RLE Assessment WFL   LLE Assessment   LLE Assessment WFL   Bed Mobility   Supine to Sit Unable to assess   Sit to Supine Unable to assess   Additional Comments Pt seated OOB upon PT arrival  Pt returned seated OOB at end of session w/ all needs within reach    Transfers   Sit to Stand 5  Supervision   Additional items Verbal cues   Stand to Sit 5  Supervision   Additional items Verbal cues   Additional Comments Transfers w/ RW   Ambulation/Elevation   Gait pattern Excessively slow; Short stride; Inconsistent crystal   Gait Assistance   (CGA progressing to supervision)   Additional items Assist x 1;Verbal cues   Assistive Device Rolling walker   Distance 300ft   Balance   Static Sitting Fair +   Dynamic Sitting Fair   Static Standing Fair -   Dynamic Standing Fair -   Ambulatory Fair -   Endurance Deficit   Endurance Deficit No   Activity Tolerance   Activity Tolerance Patient tolerated treatment well   Medical Staff Made Aware OT Renata; Co-evaluation performed w/ OT due to pt's multiple co-morbidities, clinically unstable presentation, and regression in functional impairments as compared to baseline  PT focused on transfers, mobility, and LE assessment   Nurse Made Aware yes   Assessment   Prognosis Good   Problem List Impaired balance;Decreased mobility; Decreased cognition;Decreased endurance   Assessment Pt is 80 y o  male seen for PT evaluation s/p [facility] w/ Other chest pain  PT consulted to assess pt's functional mobility and d/c needs  Order placed for PT eval and tx, w/ up w/ A order  Comorbidities affecting pt's physical performance at time of assessment include: CKD, CHF, CAD, HTN, cognitive decline, chronic renal disease  PTA, pt was independent w/ all functional mobility w/ RW and resides at [facility]  Personal factors affecting pt at time of IE include: ambulating w/ assistive device, decreased cognition, limited home support, limited insight into impairments and inability to perform IADLs  Please find objective findings from PT assessment regarding body systems outlined above with impairments and limitations including weakness, impaired balance, decreased endurance, gait deviations, decreased activity tolerance, decreased functional mobility tolerance, fall risk and decreased cognition  Pt performed STS at supervision and ambulated 300ft w/ RW at [facility]  The following objective measures performed on IE also reveal limitations: AM-PAC 6-Clicks: 85/64  Pt's clinical presentation is currently unstable/unpredictable seen in pt's presentation of recent admission for chest pain requiring medical attention, recent decline in function as compared to baseline, fall risk, cognitive deficits  Pt to benefit from continued PT tx to address deficits as defined above and maximize level of functional independent mobility and consistency  From PT/mobility standpoint, recommendation at time of d/c would be no rehabilitation needs pending progress in order to facilitate return to PLOF  Barriers to Discharge Decreased caregiver support   Goals   Patient Goals to return home   STG Expiration Date 03/20/22   Short Term Goal #1 1  Pt will demonstrate the ability to perform all aspects of bed mobility at Mod I in onder to maximize functional independence and decrease burden on caregivers   2 Pt will demonstrate the ability to perform all functional transfers at Mod I in order to maximize functional independence and decrease burden on caregivers  3 Pt will demonstrate the ability to ambulate at least 150ft with least restrictive device at Mod I in order to maximize functional independence  4 Pt will demonstrate improved balance by one grade in order to decrease risk of falls  5 Pt will increase b/l LE strength by 1 grade in order to increase ease of functional mobility and transfers   PT Treatment Day 0   Plan   Treatment/Interventions Functional transfer training;LE strengthening/ROM; Therapeutic exercise; Endurance training;Patient/family training;Equipment eval/education; Bed mobility;Gait training;Spoke to nursing   PT Frequency 3-5x/wk   Recommendation   PT Discharge Recommendation No rehabilitation needs  (return to [facility])   [address]   Turning in Bed Without Bedrails 4   Lying on Back to Sitting on Edge of Flat Bed 4   Moving Bed to Chair 3   Standing Up From Chair 3   Walk in Room 3   Climb 3-5 Stairs 3   Basic Mobility Inpatient Raw Score 20   Basic Mobility Standardized Score 43 99   Highest Level Of Mobility   JH-HLM Goal 6: Walk 10 steps or more   JH-HLM Highest Level of Mobility 8: Walk 250 feet ot more   JH-HLM Goal Achieved Yes   Modified Redfield Scale   Modified Redfield Scale 3     The patient's AM-PAC Basic Mobility Inpatient Short Form Raw Score is 20  A Raw score of greater than 16 suggests the patient may benefit from discharge to home  Please also refer to the recommendation of the Physical Therapist for safe discharge planning      Lorette Cabot, PT , DPT

## 2022-03-06 NOTE — OCCUPATIONAL THERAPY NOTE
Occupational Therapy Evaluation     Patient Name: Mandie MCCLENDON Date: 3/6/2022  Problem List  Principal Problem:    Other chest pain  Active Problems:    CKD (chronic kidney disease) stage 4, GFR 15-29 ml/min ([facility])    Chronic combined systolic and diastolic heart failure ([facility] [address] )    Coronary artery disease    Hypertension    Hypercholesterolemia    Cognitive decline    Past Medical History  Past Medical History:   Diagnosis Date    Chronic renal disease, stage III (HCC)     Chronic renal disease, stage III (HCC)     Essential hypertension, benign     Essential hypertension, benign      Past Surgical History  Past Surgical History:   Procedure Laterality Date    CORONARY ARTERY BYPASS GRAFT  2012    x4         03/06/22 1100   OT Last Visit   OT Visit Date 03/06/22   Note Type   Note type Evaluation   Restrictions/Precautions   Weight Bearing Precautions Per Order No   Other Precautions Fall Risk;Telemetry   Pain Assessment   Pain Assessment Tool 0-10   Pain Score No Pain   Home Living   Type of Home Assisted living/ILF or STR (?)  ([facility])   Home Layout One level   Bathroom Shower/Tub Walk-in shower   Bathroom Toilet Standard   Bathroom Equipment Shower chair;Grab bars in shower;Grab bars around toilet;Built-in shower seat   Bathroom Accessibility [address] [facility]   Prior Function   Level of Andrew Independent with ADLs and functional mobility   Lives With Alone; Facility staff   Receives Help From Family   ADL Assistance Independent   IADLs Needs assistance   Falls in the last 6 months 0   Vocational Retired   Lifestyle   Autonomy I with ADL's/ assistance with IADL's, no AD with functional ambulation, +drives  Pt is a unreliable historian unclear if pt was at [address] [place] [address] for rehab vs ERIK/ILF, continue to assess     Reciprocal Relationships daughter, son   Service to Others retitred   Intrinsic Gratification activities at [address] 7  Independent   Grooming Assistance 7  Independent   UB Bathing Assistance 5  Supervision/Setup   LB Bathing Assistance 4  Minimal Assistance   [address] 5  Supervision/Setup   LB Dressing Assistance 4  Minimal Assistance   LB Dressing Deficit Don/doff R sock; Don/doff L sock   Toileting Assistance  4  Minimal Assistance   Bed Mobility   Supine to Sit Unable to assess   Sit to Supine Unable to assess   Additional Comments pt left in chair as received with all needs met and alarm activated   Transfers   Sit to Stand 5  Supervision   Additional items Assist x 1   Stand to Sit 5  Supervision   Additional items Assist x 1   Additional Comments +RW with transfers   Functional Mobility   Functional Mobility 4  Minimal assistance   Additional Comments min a for CG progressing to CS with RW short distance functional mobility into hallway, no overt LOB   Balance   Static Sitting Fair +   Dynamic Sitting Fair   Static Standing Fair -   Dynamic Standing Fair -   Ambulatory Fair -   Activity Tolerance   Activity Tolerance Patient tolerated treatment well   Medical Staff Made Aware PT perry due to the patient's co-morbidities, clinically unstable presentation, and present impairments which are a regression from the patient's baseline  Nurse Made Aware RN cleared pt for therapy   RUE Assessment   RUE Assessment WFL   LUE Assessment   LUE Assessment WFL   Hand Function   Gross Motor Coordination Functional   Fine Motor Coordination Functional   Cognition   Overall Cognitive Status Impaired   Arousal/Participation Alert; Responsive; Cooperative   Attention Attends with cues to redirect   Orientation Level Oriented to place;Oriented to person;Oriented to time  (grossly to time)   Memory Decreased recall of precautions;Decreased recall of recent events;Decreased short term memory   Following Commands Follows one step commands with increased time or repetition   Comments pt cooperative with therapy, poor historian forgetful (pt is self aware of memory issues), verbal cues for safety  Per chart admitted with confusion, Continue to assess as needed for safe discharge planning  Assessment   Limitation Decreased ADL status; Decreased Safe judgement during ADL;Decreased cognition;Decreased endurance;Decreased self-care trans;Decreased high-level ADLs   Prognosis Fair   Assessment Pt is a 80 y o  male who was admitted to [facility] on 3/6/2022 with Other chest pain, CHF, confusion, HTN, CKD   Pt's problem list also includes PMH of  has a past medical history of Chronic renal disease, stage III ([address] ), Chronic renal disease, stage III ([facility] [address] ), Essential hypertension, benign, and Essential hypertension, benign    At baseline pt was completing I with Adl's, assistance with IaDL's  Pt lives alone at [address] [place] [address] with assistance from facility staff  Currently pt requires min a  for overall ADLS and min a for Cg/CS with RW for functional mobility/transfers  Pt currently presents with impairments in the following categories -steps to enter environment, difficulty performing ADLS, difficulty performing IADLS , limited insight into deficits and compliance activity tolerance  These impairments, as well as pt's fatigue and risk for falls  limit pt's ability to safely engage in all baseline areas of occupation, includingbathing, dressing, toileting, functional mobility/transfers, community mobility, laundry , driving, house maintenance, medication management, meal prep, social participation  and leisure activities  The patient's raw score on the AM-PAC Daily Activity inpatient short form is 20, standardized score is 42 03, greater than 39 4  Patients at this level are likely to benefit from discharge to home  Please refer to the recommendation of the Occupational Therapist for safe discharge planning  From OT standpoint, recommend home OT with increased facility support upon D/C  OT will continue to follow to address the below stated goals      Goals   Patient Goals sit in chair   LTG Time Frame 10-14   Long Term Goal #1 see goals below   Plan   Treatment Interventions ADL retraining;Functional transfer training;UE strengthening/ROM; Endurance training;Cognitive reorientation; Compensatory technique education; Energy conservation; Activityengagement   Goal Expiration Date 03/20/22   OT Frequency 3-5x/wk   Recommendation   OT Discharge Recommendation Home with home health rehabilitation (?)  (If [facility] able to provide appropriate assistance, if discharging home continue to assess accurate social history for safe discharge planning )   OT - OK to Discharge Yes   AM-PAC Daily Activity Inpatient   Lower Body Dressing 3   Bathing 3   Toileting 3   Upper Body Dressing 3   Grooming 4   Eating 4   Daily Activity Raw Score 20   Daily Activity Standardized Score (Calc for Raw Score >=11) 42 03   AM-PAC Applied Cognition Inpatient   Following a Speech/Presentation 1   Understanding Ordinary Conversation 4   Taking Medications 3   Remembering Where Things Are Placed or Put Away 3   Remembering List of 4-5 Errands 2   Taking Care of Complicated Tasks 2   Applied Cognition Raw Score 15   Applied Cognition Standardized Score 33 54      Occupational Therapy Goals:    *Mod Iwith bed mobility to engage in functional tasks  *Mod I Adl's after setup with use of AE PRN  *Mod  I toileting and clothing management   *Mod I functional mobility and transfers to/from all surfaces with Fair + dynamic balance and safety for participation in dynamic adls and iadl tasks   *Demonstrate good carryover with safe use of RW during functional tasks   *Assess DME needs   *Increase activity tolerance to 25-30 minutes for participation in adls and enjoyable activities  *Pt to participate in further cognitive testing with good attention and participation to assist with safe d/c recommendations  *Mod I with Simulated IADL management task    *Pt will follow 100% simple one step verbal commands and be A/Ox4 consistently t/o use of external environmental cues w/ mod I  Pt will independently identify and utilize 2-3 coping strategies to increase positive affect and promote overall well-being  *Demonstrate good carryover of pt/family education and training with good tolerance for increased safety and independence with ADL's/ADl's      Karlene Marie MOT, OTR/L

## 2022-03-07 VITALS
RESPIRATION RATE: 16 BRPM | BODY MASS INDEX: 21.56 KG/M2 | HEART RATE: 89 BPM | SYSTOLIC BLOOD PRESSURE: 129 MMHG | DIASTOLIC BLOOD PRESSURE: 84 MMHG | TEMPERATURE: 97.5 F | OXYGEN SATURATION: 93 % | WEIGHT: 129.41 LBS | HEIGHT: 65 IN

## 2022-03-07 PROBLEM — R07.89 OTHER CHEST PAIN: Status: RESOLVED | Noted: 2022-03-06 | Resolved: 2022-03-07

## 2022-03-07 PROCEDURE — 99217 PR OBSERVATION CARE DISCHARGE MANAGEMENT: CPT | Performed by: PHYSICIAN ASSISTANT

## 2022-03-07 RX ORDER — ISOSORBIDE MONONITRATE 30 MG/1
15 TABLET, EXTENDED RELEASE ORAL DAILY
Qty: 30 TABLET | Refills: 0 | Status: SHIPPED | OUTPATIENT
Start: 2022-03-08 | End: 2022-03-10 | Stop reason: SDUPTHER

## 2022-03-07 RX ADMIN — PRAVASTATIN SODIUM 20 MG: 20 TABLET ORAL at 08:18

## 2022-03-07 RX ADMIN — FUROSEMIDE 20 MG: 20 TABLET ORAL at 08:18

## 2022-03-07 RX ADMIN — METOPROLOL TARTRATE 50 MG: 50 TABLET, FILM COATED ORAL at 08:18

## 2022-03-07 RX ADMIN — CALCIUM 1 TABLET: 500 TABLET ORAL at 07:46

## 2022-03-07 RX ADMIN — ISOSORBIDE MONONITRATE 15 MG: 30 TABLET, EXTENDED RELEASE ORAL at 08:18

## 2022-03-07 RX ADMIN — HEPARIN SODIUM 5000 UNITS: 5000 INJECTION INTRAVENOUS; SUBCUTANEOUS at 05:51

## 2022-03-07 RX ADMIN — B-COMPLEX W/ C & FOLIC ACID TAB 1 TABLET: TAB at 08:19

## 2022-03-07 RX ADMIN — ASPIRIN 81 MG: 81 TABLET, COATED ORAL at 08:18

## 2022-03-07 RX ADMIN — CLOPIDOGREL BISULFATE 75 MG: 75 TABLET ORAL at 08:19

## 2022-03-07 NOTE — PLAN OF CARE
Problem: MOBILITY - ADULT  Goal: Maintain or return to baseline ADL function  Description: INTERVENTIONS:  -  Assess patient's ability to carry out ADLs; assess patient's baseline for ADL function and identify physical deficits which impact ability to perform ADLs (bathing, care of mouth/teeth, toileting, grooming, dressing, etc )  - Assess/evaluate cause of self-care deficits   - Assess range of motion  - Assess patient's mobility; develop plan if impaired  - Assess patient's need for assistive devices and provide as appropriate  - Encourage maximum independence but intervene and supervise when necessary  - Involve family in performance of ADLs  - Assess for home care needs following discharge   - Consider OT consult to assist with ADL evaluation and planning for discharge  - Provide patient education as appropriate  Outcome: Progressing  Goal: Maintains/Returns to pre admission functional level  Description: INTERVENTIONS:  - Perform BMAT or MOVE assessment daily    - Set and communicate daily mobility goal to care team and patient/family/caregiver     - Collaborate with rehabilitation services on mobility goals if consulted  - Out of bed for toileting  - Record patient progress and toleration of activity level   Outcome: Progressing     Problem: CARDIOVASCULAR - ADULT  Goal: Maintains optimal cardiac output and hemodynamic stability  Description: INTERVENTIONS:  - Monitor I/O, vital signs and rhythm  - Monitor for S/S and trends of decreased cardiac output  - Administer and titrate ordered vasoactive medications to optimize hemodynamic stability  - Assess quality of pulses, skin color and temperature  - Assess for signs of decreased coronary artery perfusion  - Instruct patient to report change in severity of symptoms  Outcome: Progressing  Goal: Absence of cardiac dysrhythmias or at baseline rhythm  Description: INTERVENTIONS:  - Continuous cardiac monitoring, vital signs, obtain 12 lead EKG if ordered  - Administer antiarrhythmic and heart rate control medications as ordered  - Monitor electrolytes and administer replacement therapy as ordered  Outcome: Progressing     Problem: SAFETY ADULT  Goal: Maintain or return to baseline ADL function  Description: INTERVENTIONS:  -  Assess patient's ability to carry out ADLs; assess patient's baseline for ADL function and identify physical deficits which impact ability to perform ADLs (bathing, care of mouth/teeth, toileting, grooming, dressing, etc )  - Assess/evaluate cause of self-care deficits   - Assess range of motion  - Assess patient's mobility; develop plan if impaired  - Assess patient's need for assistive devices and provide as appropriate  - Encourage maximum independence but intervene and supervise when necessary  - Involve family in performance of ADLs  - Assess for home care needs following discharge   - Consider OT consult to assist with ADL evaluation and planning for discharge  - Provide patient education as appropriate  Outcome: Progressing  Goal: Maintains/Returns to pre admission functional level  Description: INTERVENTIONS:  - Perform BMAT or MOVE assessment daily    - Set and communicate daily mobility goal to care team and patient/family/caregiver     - Collaborate with rehabilitation services on mobility goals if consulted  - Out of bed for toileting  - Record patient progress and toleration of activity level   Outcome: Progressing  Goal: Patient will remain free of falls  Description: INTERVENTIONS:  - Educate patient/family on patient safety including physical limitations  - Instruct patient to call for assistance with activity   - Consult OT/PT to assist with strengthening/mobility   - Keep Call bell within reach  - Keep bed low and locked with side rails adjusted as appropriate  - Keep care items and personal belongings within reach  - Initiate and maintain comfort rounds  - Make Fall Risk Sign visible to staff  - Apply yellow socks and bracelet for high fall risk patients  - Consider moving patient to room near nurses station  Outcome: Progressing

## 2022-03-07 NOTE — RESTORATIVE TECHNICIAN NOTE
Restorative Technician Note      Patient Name: Favian Barone     Note Type: Mobility  Patient Position Upon Consult: Supine  Activity Performed: Ambulated; ODUNLMY; Stood  Assistive Device: Roller walker  Education Provided: Yes  Patient Position at End of Consult: Supine;  All needs within reach; Bed/Chair alarm activated  Allison HELM, Restorative Technician, [employer]

## 2022-03-07 NOTE — DISCHARGE SUMMARY
[address] [facility]  Discharge- [facility] 12/6/1926, 80 y o  male MRN: [id number]  Unit/Bed#: [facility] 726-01 Encounter: [id number]  Primary Care Provider: Kathy Reynolds DO   Date and time admitted to hospital: 3/6/2022 12:11 AM    * Other chest pain-resolved as of 3/7/2022  Assessment & Plan  · Presented with single fleeting episode of chest pain, described as dull ache that woke him from sleep  Resolved within minutes and has not recurred  · KASSY score = 4  · Troponin negative x 3  EKG without ischemic changes compared to prior  · Appreciate cardiology inputs, no need for further workup at this time   · Continue home medical management with additional of low dose Imdur 15 mg daily     Chronic combined systolic and diastolic heart failure (HCC)  Assessment & Plan  Wt Readings from Last 3 Encounters:   03/06/22 58 7 kg (129 lb 6 6 oz)   03/01/22 58 1 kg (128 lb 1 4 oz)   12/08/21 59 9 kg (132 lb)     · Presented due to acute onset chest pain that awoke him from sleep, which resolved prior to arrival   · CXR: CHF with mild interstitial edema and small effusions   Improved from prior  · proBNP on admission 13,239 but improved most recent value 17,709 on 2/26  · Most recent echo last month: LVEF 88%, systolic function severely reduced, no R WMA, severe global hypokinesis, diastolic function abnormal, mild to moderate AR, moderate MR, mild-to-moderate TR with estimated PA pressure of 45 mmHg  · Continued on home dose diuretic currently: Lasix 20 mg BID   · Continue with home dose metoprolol  Previously on Diovan 40 mg daily, which was discontinued on last hospitalization     · Appreciate caridology inputs, no need for IV diuresis at this time   · Monitor daily weights, intake and output    Cognitive decline  Assessment & Plan  · Patient son reported increased confusion since prior hospitalization  · CT head without acute intracranial abnormality   · No signs or symptoms of acute infectious process  No focal neurologic deficits on exam   · Suspect age related cognitive decline exacerbated by recent hospitalization   · Supportive cares, frequent reorientation, delirium precautions     CKD (chronic kidney disease) stage 4, GFR 15-29 ml/min [facility])  Assessment & Plan  Lab Results   Component Value Date    EGFR 24 03/06/2022    EGFR 24 03/06/2022    EGFR 22 03/04/2022    CREATININE 2 18 (H) 03/06/2022    CREATININE 2 23 (H) 03/06/2022    CREATININE 2 40 (H) 03/04/2022   · Follows with nephrology outpatient  · Creatinine currently at baseline, approximately 1 9-2 3  · Defer diuretic management to cardiology   · Avoid nephrotoxins and hypotension   · Continue to monitor BMP     Hypercholesterolemia  Assessment & Plan  · On pravastatin      Hypertension  Assessment & Plan  · BP acceptable, monitor routinely   · Continue home dose metoprolol     Coronary artery disease  Assessment & Plan  · Presenting with one episode of fleeting chest pain, see plan above   · Continue with aspirin, statin, Plavix, BB   · Imdur added as above       Medical Problems             Resolved Problems  Date Reviewed: 3/7/2022          Resolved    * (Principal) Other chest pain 3/7/2022     Resolved by  Charu Alba PA-C              Discharging Physician / Practitioner: Charu Alba PA-C  PCP: Laura Hancock DO  Admission Date:   Admission Orders (From admission, onward)     Ordered        03/06/22 0236  Place in Observation  Once                      Discharge Date: 03/07/22    Consultations During Hospital Stay:  · Cardiology     Procedures Performed:   · none    Significant Findings / Test Results:   · Outlined above     Incidental Findings:   · none     Test Results Pending at Discharge (will require follow up):   · none     Outpatient Tests Requested:  · none    Complications:  none    Reason for Admission: chest pain    Hospital Course:   Ivis Yousif is a 80 y o  male patient who originally presented to the hospital on 3/6/2022 due to acute onset chest pain waking him up from sleep  Pain was self limits after a few minutes and had resolved prior to arrival  ED workup essentially unremarkable with negative  troponin trend and EKG without ischemic changes compared to prior  CXR showed improved interstitial edema and proBNP also improved compared to prior  Given cardiac history with recent admission for CHF exacerbation, patient was seen in consultation by cardiology  Low suspicion for cardiac etiology for chest pain however did add low dose Imdur to his regimen and cleared him for discharge without need for further workup  Patient was evaluated by PT/OT who recommended return to [facility] assisted living  Patient will follow-up with cardiology and PCP after discharge  Plan of care was discussed with patient's son who expressed understanding and is in agreement  Please see above list of diagnoses and related plan for additional information  Condition at Discharge: good    Discharge Day Visit / Exam:   Subjective:  Patient offers no complaints  He specifically denies chest pain or SOB  Reports feeling well this morning  Vitals: Blood Pressure: 129/84 (03/07/22 0750)  Pulse: 89 (03/07/22 0750)  Temperature: 97 5 °F (36 4 °C) (03/07/22 0750)  Temp Source: Oral (03/06/22 2159)  Respirations: 16 (03/07/22 0750)  Height: 5' 5" (165 1 cm) (03/06/22 2159)  Weight - Scale: 58 7 kg (129 lb 6 6 oz) (03/06/22 0900)  SpO2: 93 % (03/07/22 0750)  Exam:   Physical Exam  Vitals and nursing note reviewed  Constitutional:       General: He is not in acute distress  Cardiovascular:      Rate and Rhythm: Normal rate and regular rhythm  Heart sounds: No murmur heard  Pulmonary:      Effort: Pulmonary effort is normal  No respiratory distress  Breath sounds: No wheezing or rales  Abdominal:      General: Abdomen is flat  There is no distension  Palpations: Abdomen is soft  Tenderness:  There is no abdominal tenderness  Musculoskeletal:      Right lower leg: No edema  Left lower leg: No edema  Skin:     General: Skin is warm and dry  Coloration: Skin is not pale  Findings: No erythema  Neurological:      Mental Status: He is alert and oriented to person, place, and time  Discussion with Family: Updated  (son) via phone  Discharge instructions/Information to patient and family:   See after visit summary for information provided to patient and family  Provisions for Follow-Up Care:  See after visit summary for information related to follow-up care and any pertinent home health orders  Disposition:   Assisted Living Facility at [facility] Readmission: no     Discharge Statement:  I spent 25 minutes discharging the patient  This time was spent on the day of discharge  I had direct contact with the patient on the day of discharge  Greater than 50% of the total time was spent examining patient, answering all patient questions, arranging and discussing plan of care with patient as well as directly providing post-discharge instructions  Additional time then spent on discharge activities  Discharge Medications:  See after visit summary for reconciled discharge medications provided to patient and/or family        **Please Note: This note may have been constructed using a voice recognition system**

## 2022-03-07 NOTE — ASSESSMENT & PLAN NOTE
· Presented with single fleeting episode of chest pain, described as dull ache that woke him from sleep  Resolved within minutes and has not recurred  · KASSY score = 4  · Troponin negative x 3  EKG without ischemic changes compared to prior    · Appreciate cardiology inputs, no need for further workup at this time   · Continue home medical management with additional of low dose Imdur 15 mg daily

## 2022-03-07 NOTE — ASSESSMENT & PLAN NOTE
Wt Readings from Last 3 Encounters:   03/06/22 58 7 kg (129 lb 6 6 oz)   03/01/22 58 1 kg (128 lb 1 4 oz)   12/08/21 59 9 kg (132 lb)     · Presented due to acute onset chest pain that awoke him from sleep, which resolved prior to arrival   · CXR with mild vascular congestion, formal read pending   · proBNP on admission 13,239 but improved most recent value 17,709 on 2/26  · Most recent echo last month: LVEF 19%, systolic function severely reduced, no R WMA, severe global hypokinesis, diastolic function abnormal, mild to moderate AR, moderate MR, mild-to-moderate TR with estimated PA pressure of 45 mmHg  · Continued on home dose diuretic currently: Lasix 20 mg BID   · Continue with home dose metoprolol  Previously on Diovan 40 mg daily, which was discontinued on last hospitalization     · Appreciate caridology inputs, no need for IV diuresis at this time   · Monitor daily weights, intake and output

## 2022-03-07 NOTE — ASSESSMENT & PLAN NOTE
Wt Readings from Last 3 Encounters:   03/06/22 58 7 kg (129 lb 6 6 oz)   03/01/22 58 1 kg (128 lb 1 4 oz)   12/08/21 59 9 kg (132 lb)     · Presented due to acute onset chest pain that awoke him from sleep, which resolved prior to arrival   · CXR with mild vascular congestion, formal read pending   · proBNP on admission 13,239 but improved most recent value 17,709 on 2/26  · Most recent echo last month: LVEF 50%, systolic function severely reduced, no R WMA, severe global hypokinesis, diastolic function abnormal, mild to moderate AR, moderate MR, mild-to-moderate TR with estimated PA pressure of 45 mmHg  · Continued on home dose diuretic currently: Lasix 20 mg BID   · Continue with home dose metoprolol  Previously on Diovan 40 mg daily, which was discontinued on last hospitalization     · Appreciate caridology inputs, no need for IV diuresis at this time   · Monitor daily weights, intake and output

## 2022-03-07 NOTE — ASSESSMENT & PLAN NOTE
· Presenting with one episode of fleeting chest pain, see plan above   · Continue with aspirin, statin, Plavix, BB   · Imdur added as above

## 2022-03-09 NOTE — PROGRESS NOTES
Heart Failure Outpatient Consult Note - Marck Chase 80 y o  male MRN: [id number]    @ Encounter: [id number]      Assessment/Plan:    Patient Active Problem List    Diagnosis Date Noted    Cognitive decline 03/06/2022    PVC's (premature ventricular contractions) 02/28/2022    Chronic renal disease, stage IV ([facility] ) 12/08/2021    Chronic kidney disease-mineral and bone disorder 08/26/2021    Chronic combined systolic and diastolic heart failure ([facility] ) 08/26/2021    CKD (chronic kidney disease) stage 4, GFR 15-29 ml/min ([facility]) 07/09/2013    Coronary artery disease 07/09/2013    Hypertension 07/09/2013    Benign hypertensive CKD, stage 1-4 or unspecified chronic kidney disease 05/11/2013    Hypercholesterolemia 10/08/2012     Chronic HFrEF, LVEF 25%  Etiology: possibly iCM given CAD history  Examines warm, compensated  Will continue on Lasix 20 mg BID with PRN dose of 20 mg for weight gain or worsening HF symptoms  Needs to be on 2 g Na diet with 2L fluid restriction  Weights daily  Dry Weight - 128 lb, today 133 lbs  TTE 2/26/22: LVEF 25%, LVIDd 6 1%, severe global hypok  RV normal, mod MR, mod TR, est PASP 45 mmhg, left pleural effusion  TTE 11/2020: Stephy Reyes: LVEF 25-30%  Nuclear stress imaging 11/2020: Asha Orozco: Abnormal study with medium sized, mostly fixed inferior wall defect  Lab Results   Component Value Date    NTBNP 13,239 (H) 03/06/2022        Neurohormonal Blockade:  --Beta-Blocker: Metoprolol tartrate 50 mg BID  --ACEi, ARB or ARNi:  --Aldosterone Receptor Blocker:  --Diuretic: Lasix 20 mg BID    Sudden Cardiac Death Risk Reduction:  --ICD: EF 25%    Electrical Resynchronization:  --Candidacy for BiV device: LBBB    Advanced Therapies (if appropriate): --Inotrope:  --LVAD/Transplant Candidacy:    CAD  H/O prior CABG  Recent episodes of chest pain/pressure, which may have been more volume related versus true angina   Started on low dose Imdur with no further reports of chest pain or pressure  Will continue this along with BB, ASA, Plavix, statin  Further ischemic workup not recommended  Continue to treat medically given age and CKD  HTN  HLD  Pravastatin 20 mg daily  CKD  LBBB    HPI:   80year old with PMH as above who presents with his son for hospital follow up  Recently admitted x 2, first with CHF exacerbation and then again with chest pain  He follows with Dr Fior Glez  Last visit in December he had been doing fairly well  Plan at that time was to continue to treat his CAD and CHF medically  No further invasive testing or workup recommended given age and renal dysfunction  Today he has no complaints other than SOB with exertion that has not changed  Denies any further chest pain or pressure, orthopnea or PND  Lives at [facility]  Not currently on a low sodium diet but fluids are being monitored  He was weighed 2 days ago  Past Medical History:   Diagnosis Date    Chronic renal disease, stage III ([facility] )     Chronic renal disease, stage III ([facility] )     Essential hypertension, benign     Essential hypertension, benign        12 point ROS negative other than that stated in HPI    Allergies   Allergen Reactions    Loratadine     Nitrofurantoin     Penicillins     Phenazopyridine            Current Outpatient Medications:     aspirin (ASPIRIN LOW DOSE) 81 mg EC tablet, Take 1 tablet by mouth daily, Disp: , Rfl:     Biotin (RA Biotin) 2 5 MG CAPS, Take by mouth daily, Disp: , Rfl:     calcium carbonate (OS-VIRGEN) 1250 (500 Ca) MG tablet, Take 600 mg by mouth, Disp: , Rfl:     cholecalciferol (VITAMIN D3) 1,000 units tablet, Take 1,000 Units by mouth daily, Disp: , Rfl:     clopidogrel (Plavix) 75 mg tablet, Take 1 tablet (75 mg total) by mouth daily, Disp: 90 tablet, Rfl: 3    furosemide (LASIX) 20 mg tablet, Take 1 tablet (20 mg total) by mouth 2 (two) times a day, Disp: 90 tablet, Rfl: 0    isosorbide mononitrate (IMDUR) 30 mg 24 hr tablet, Take 0 5 tablets (15 mg total) by mouth daily, Disp: 30 tablet, Rfl: 0    metoprolol tartrate (LOPRESSOR) 50 mg tablet, Take 1 tablet (50 mg total) by mouth 2 (two) times a day, Disp: 180 tablet, Rfl: 3    Multiple Vitamin (multivitamin) capsule, Take 1 capsule by mouth daily, Disp: , Rfl:     pravastatin (PRAVACHOL) 20 mg tablet, Take 1 tablet (20 mg total) by mouth daily, Disp: 90 tablet, Rfl: 3    Social History     Socioeconomic History    Marital status: /Civil Union     Spouse name: Not on file    Number of children: Not on file    Years of education: Not on file    Highest education level: Not on file   Occupational History    Occupation: RETIRED   Tobacco Use    Smoking status: Never Smoker    Smokeless tobacco: Never Used   Vaping Use    Vaping Use: Never used   Substance and Sexual Activity    Alcohol use: Not Currently    Drug use: No    Sexual activity: Not on file   Other Topics Concern    Not on file   Social History Narrative    Not on file     Social Determinants of Health     Financial Resource Strain: Not on file   Food Insecurity: No Food Insecurity    Worried About Running Out of Food in the Last Year: Never true    Ran Out of Food in the Last Year: Never true   Transportation Needs: Not on file   Physical Activity: Not on file   Stress: Not on file   Social Connections: Not on file   Intimate Partner Violence: Not on file   Housing Stability: Unknown    Unable to Pay for Housing in the Last Year: No    Number of [place] in the Last Year: Not on file    Unstable Housing in the Last Year: No       Family History   Problem Relation Age of Onset    No Known Problems Mother     No Known Problems Father        Physical Exam:    Vitals: /64 (BP Location: Right arm, Patient Position: Sitting, Cuff Size: Standard)   Pulse 72   Ht 5' 5" (1 651 m)   Wt 60 4 kg (133 lb 1 6 oz)   SpO2 97%   BMI 22 15 kg/m²     Wt Readings from Last 3 Encounters:   03/06/22 58 7 kg (129 lb 6 6 oz)   03/01/22 58 1 kg (128 lb 1 4 oz)   12/08/21 59 9 kg (132 lb)       Physical Exam:    GEN: Valente Mancera appears well, alert and oriented x 3, pleasant and cooperative   HEENT: pupils equal, round, and reactive to light; extraocular muscles intact  NECK: supple, no carotid bruits   HEART: regular rhythm, normal S1 and S2, no murmurs, clicks, gallops or rubs, JVP is down  LUNGS: clear to auscultation bilaterally; no wheezes, rales, or rhonchi   ABDOMEN: normal bowel sounds, soft, no tenderness, no distention  EXTREMITIES: warm, peripheral pulses normal; no clubbing, cyanosis, or edema  NEURO: no focal findings   SKIN: normal without suspicious lesions on exposed skin    Labs & Results:    Lab Results   Component Value Date    WBC 8 19 03/06/2022    HGB 11 2 (L) 03/06/2022    HCT 33 1 (L) 03/06/2022    MCV 96 03/06/2022     (L) 03/06/2022     Lab Results   Component Value Date    SODIUM 137 03/06/2022    K 4 2 03/06/2022     03/06/2022    CO2 23 03/06/2022    BUN 45 (H) 03/06/2022    CREATININE 2 18 (H) 03/06/2022    GLUC 98 03/06/2022    CALCIUM 8 5 03/06/2022     Lab Results   Component Value Date    NTBNP 13,239 (H) 03/06/2022      Lab Results   Component Value Date    CHOLESTEROL 150 02/26/2022     Lab Results   Component Value Date    HDL 51 02/26/2022     Lab Results   Component Value Date    TRIG 67 02/26/2022     Lab Results   Component Value Date    NONHDLC 99 02/26/2022       EKG personally reviewed by SCOTTIE Mcekon  No results found for this visit on 03/10/22  Counseling / Coordination of Care  Total floor / unit time spent today 40 minutes  Greater than 50% of total time was spent with the patient and / or family counseling and / or coordination of care  A description of the counseling / coordination of care: 25 min  Thank you for the opportunity to participate in the care of this patient  Millicent Resendez

## 2022-03-10 ENCOUNTER — OFFICE VISIT (OUTPATIENT)
Dept: CARDIOLOGY CLINIC | Facility: CLINIC | Age: 87
End: 2022-03-10
Payer: MEDICARE

## 2022-03-10 VITALS
OXYGEN SATURATION: 97 % | HEIGHT: 65 IN | BODY MASS INDEX: 22.17 KG/M2 | DIASTOLIC BLOOD PRESSURE: 64 MMHG | SYSTOLIC BLOOD PRESSURE: 114 MMHG | WEIGHT: 133.1 LBS | HEART RATE: 72 BPM

## 2022-03-10 DIAGNOSIS — I25.10 CORONARY ARTERY DISEASE INVOLVING NATIVE HEART, UNSPECIFIED VESSEL OR LESION TYPE, UNSPECIFIED WHETHER ANGINA PRESENT: ICD-10-CM

## 2022-03-10 DIAGNOSIS — R07.89 ATYPICAL CHEST PAIN: ICD-10-CM

## 2022-03-10 DIAGNOSIS — I25.10 CORONARY ARTERY DISEASE INVOLVING NATIVE CORONARY ARTERY OF NATIVE HEART WITHOUT ANGINA PECTORIS: ICD-10-CM

## 2022-03-10 PROCEDURE — 99215 OFFICE O/P EST HI 40 MIN: CPT | Performed by: NURSE PRACTITIONER

## 2022-03-10 RX ORDER — ISOSORBIDE MONONITRATE 30 MG/1
15 TABLET, EXTENDED RELEASE ORAL DAILY
Qty: 30 TABLET | Refills: 2 | Status: SHIPPED | OUTPATIENT
Start: 2022-03-10 | End: 2022-06-24 | Stop reason: ALTCHOICE

## 2022-03-10 RX ORDER — FUROSEMIDE 20 MG/1
20 TABLET ORAL 2 TIMES DAILY
Qty: 90 TABLET | Refills: 3 | Status: ON HOLD | OUTPATIENT
Start: 2022-03-10 | End: 2022-04-07 | Stop reason: SDUPTHER

## 2022-03-10 NOTE — PATIENT INSTRUCTIONS
Weight yourself daily  If you gain 3 lbs in one day or 5 lbs in one week, please call the office at [phone] and ask for a nurse or the heart failure nurse  Keep your sodium intake to <2 grams, (2000 mg) per day, and fluids <2 Liters (2000 ml) per day  This is around 6-7, 8 oz glasses of fluid per day    Continue current medications  You may take an additional 20 mg of Lasix for 3 lb weight gain in 24 hours, 5lbs weight gain in one week, or for worsening edema or SOB  You can try taking your PM Lasix pill at 3 or 4 in the afternoon to avoid night time urination  If this does not help matters, can try taking all 40 mg in the AM    Talk with your PCP and nephrologist  about starting Flomax

## 2022-03-24 ENCOUNTER — PATIENT OUTREACH (OUTPATIENT)
Dept: CASE MANAGEMENT | Facility: OTHER | Age: 87
End: 2022-03-24

## 2022-03-24 NOTE — PROGRESS NOTES
Chart review reveals that patient was inpatient 3/6/22-3/7/22 secondary to chest pain  Patient completed an OV with cardiology on 3/10/22  No changes to medication were noted  Patient/staff at [facility] were instructed that patient may have an additional dose of furosemide 20 mg po if weight increases 3 lbs overnight  No further invasive treatments  Medical management only based on age and CKD  On 3/21/22 patient was prescribed tamsulosin 0 4 mg po daily at   Patient will have a f/u visit with nephrology on 4/27/22 and will see cardiology on 4/25/22

## 2022-03-25 ENCOUNTER — TELEPHONE (OUTPATIENT)
Dept: NEPHROLOGY | Facility: CLINIC | Age: 87
End: 2022-03-25

## 2022-03-25 NOTE — TELEPHONE ENCOUNTER
[facility] [place] letting them know they can hold the Flomax and if patient continues to be altered then they should send him to the ED  Asked them to call back if they have any questions

## 2022-03-25 NOTE — TELEPHONE ENCOUNTER
Script sent to [facility] to hold Flomax  (cannot accept just a verbal)  Fax [phone]

## 2022-03-25 NOTE — TELEPHONE ENCOUNTER
Vonnie Reece- visiting nurse called our office  She is there with pt today - she states pt is not himself today, he is confused, wobbly, /58  Dr Danny Starks had started pt on Flomax on 3/21 and that's when the changes started  Spoke to Edward Alanis ([address]) - she recommends to hold Flomax, however she feels that if pt is that altered is best that he goes to ER to be evaluated  Called Vonnie Reece back at [phone], she agrees with Prisca's recommendations

## 2022-03-26 ENCOUNTER — LAB REQUISITION (OUTPATIENT)
Dept: LAB | Facility: HOSPITAL | Age: 87
End: 2022-03-26
Payer: MEDICARE

## 2022-03-26 DIAGNOSIS — N34.0 URETHRAL ABSCESS: ICD-10-CM

## 2022-03-26 LAB
BACTERIA UR QL AUTO: ABNORMAL /HPF
BILIRUB UR QL STRIP: NEGATIVE
CLARITY UR: CLEAR
COLOR UR: COLORLESS
GLUCOSE UR STRIP-MCNC: NEGATIVE MG/DL
HGB UR QL STRIP.AUTO: ABNORMAL
KETONES UR STRIP-MCNC: NEGATIVE MG/DL
LEUKOCYTE ESTERASE UR QL STRIP: NEGATIVE
NITRITE UR QL STRIP: NEGATIVE
NON-SQ EPI CELLS URNS QL MICRO: ABNORMAL /HPF
PH UR STRIP.AUTO: 6 [PH]
PROT UR STRIP-MCNC: NEGATIVE MG/DL
RBC #/AREA URNS AUTO: ABNORMAL /HPF
SP GR UR STRIP.AUTO: 1.01 (ref 1–1.03)
UROBILINOGEN UR STRIP-ACNC: <2 MG/DL
WBC #/AREA URNS AUTO: ABNORMAL /HPF

## 2022-03-26 PROCEDURE — 81001 URINALYSIS AUTO W/SCOPE: CPT | Performed by: FAMILY MEDICINE

## 2022-03-26 PROCEDURE — 87086 URINE CULTURE/COLONY COUNT: CPT | Performed by: FAMILY MEDICINE

## 2022-03-27 LAB — BACTERIA UR CULT: NORMAL

## 2022-03-29 ENCOUNTER — LAB REQUISITION (OUTPATIENT)
Dept: LAB | Facility: HOSPITAL | Age: 87
End: 2022-03-29
Payer: MEDICARE

## 2022-03-29 DIAGNOSIS — G47.51 CONFUSIONAL AROUSALS: ICD-10-CM

## 2022-03-29 LAB
BACTERIA UR QL AUTO: ABNORMAL /HPF
BILIRUB UR QL STRIP: NEGATIVE
CLARITY UR: CLEAR
COLOR UR: ABNORMAL
GLUCOSE UR STRIP-MCNC: NEGATIVE MG/DL
HGB UR QL STRIP.AUTO: ABNORMAL
HYALINE CASTS #/AREA URNS LPF: ABNORMAL /LPF
KETONES UR STRIP-MCNC: NEGATIVE MG/DL
LEUKOCYTE ESTERASE UR QL STRIP: NEGATIVE
MUCOUS THREADS UR QL AUTO: ABNORMAL
NITRITE UR QL STRIP: NEGATIVE
NON-SQ EPI CELLS URNS QL MICRO: ABNORMAL /HPF
PH UR STRIP.AUTO: 6 [PH]
PROT UR STRIP-MCNC: ABNORMAL MG/DL
RBC #/AREA URNS AUTO: ABNORMAL /HPF
SP GR UR STRIP.AUTO: 1.01 (ref 1–1.03)
UROBILINOGEN UR STRIP-ACNC: <2 MG/DL
WBC #/AREA URNS AUTO: ABNORMAL /HPF

## 2022-03-29 PROCEDURE — 87086 URINE CULTURE/COLONY COUNT: CPT | Performed by: FAMILY MEDICINE

## 2022-03-29 PROCEDURE — 81001 URINALYSIS AUTO W/SCOPE: CPT | Performed by: FAMILY MEDICINE

## 2022-03-30 LAB — BACTERIA UR CULT: NORMAL

## 2022-04-03 ENCOUNTER — HOSPITAL ENCOUNTER (INPATIENT)
Facility: HOSPITAL | Age: 87
LOS: 3 days | Discharge: NURSING, REHAB OR LONG-TERM CARE | DRG: 070 | End: 2022-04-07
Attending: EMERGENCY MEDICINE | Admitting: INTERNAL MEDICINE
Payer: MEDICARE

## 2022-04-03 ENCOUNTER — APPOINTMENT (EMERGENCY)
Dept: RADIOLOGY | Facility: HOSPITAL | Age: 87
DRG: 070 | End: 2022-04-03
Payer: MEDICARE

## 2022-04-03 DIAGNOSIS — R40.0 SOMNOLENCE: Primary | ICD-10-CM

## 2022-04-03 DIAGNOSIS — G93.40 ENCEPHALOPATHY ACUTE: ICD-10-CM

## 2022-04-03 DIAGNOSIS — I25.10 CORONARY ARTERY DISEASE INVOLVING NATIVE CORONARY ARTERY OF NATIVE HEART WITHOUT ANGINA PECTORIS: ICD-10-CM

## 2022-04-03 DIAGNOSIS — I48.91 A-FIB (HCC): ICD-10-CM

## 2022-04-03 DIAGNOSIS — E87.6 HYPOKALEMIA: ICD-10-CM

## 2022-04-03 LAB
2HR DELTA HS TROPONIN: -2 NG/L
4HR DELTA HS TROPONIN: -2 NG/L
ALBUMIN SERPL BCP-MCNC: 3.2 G/DL (ref 3.5–5)
ALP SERPL-CCNC: 87 U/L (ref 46–116)
ALT SERPL W P-5'-P-CCNC: 38 U/L (ref 12–78)
ANION GAP SERPL CALCULATED.3IONS-SCNC: 8 MMOL/L (ref 4–13)
AST SERPL W P-5'-P-CCNC: 18 U/L (ref 5–45)
BACTERIA UR QL AUTO: ABNORMAL /HPF
BASOPHILS # BLD AUTO: 0.12 THOUSANDS/ΜL (ref 0–0.1)
BASOPHILS NFR BLD AUTO: 1 % (ref 0–1)
BILIRUB SERPL-MCNC: 0.57 MG/DL (ref 0.2–1)
BILIRUB UR QL STRIP: NEGATIVE
BUN SERPL-MCNC: 40 MG/DL (ref 5–25)
CALCIUM ALBUM COR SERPL-MCNC: 9.5 MG/DL (ref 8.3–10.1)
CALCIUM SERPL-MCNC: 8.9 MG/DL (ref 8.3–10.1)
CARDIAC TROPONIN I PNL SERPL HS: 22 NG/L
CARDIAC TROPONIN I PNL SERPL HS: 22 NG/L
CARDIAC TROPONIN I PNL SERPL HS: 24 NG/L
CHLORIDE SERPL-SCNC: 104 MMOL/L (ref 100–108)
CHOLEST SERPL-MCNC: 131 MG/DL
CLARITY UR: CLEAR
CO2 SERPL-SCNC: 28 MMOL/L (ref 21–32)
COLOR UR: YELLOW
CREAT SERPL-MCNC: 2.07 MG/DL (ref 0.6–1.3)
EOSINOPHIL # BLD AUTO: 0.21 THOUSAND/ΜL (ref 0–0.61)
EOSINOPHIL NFR BLD AUTO: 2 % (ref 0–6)
ERYTHROCYTE [DISTWIDTH] IN BLOOD BY AUTOMATED COUNT: 13.7 % (ref 11.6–15.1)
EST. AVERAGE GLUCOSE BLD GHB EST-MCNC: 114 MG/DL
GFR SERPL CREATININE-BSD FRML MDRD: 26 ML/MIN/1.73SQ M
GLUCOSE SERPL-MCNC: 104 MG/DL (ref 65–140)
GLUCOSE SERPL-MCNC: 98 MG/DL (ref 65–140)
GLUCOSE UR STRIP-MCNC: NEGATIVE MG/DL
HBA1C MFR BLD: 5.6 %
HCT VFR BLD AUTO: 38.3 % (ref 36.5–49.3)
HDLC SERPL-MCNC: 50 MG/DL
HGB BLD-MCNC: 12.5 G/DL (ref 12–17)
HGB UR QL STRIP.AUTO: ABNORMAL
IMM GRANULOCYTES # BLD AUTO: 0.07 THOUSAND/UL (ref 0–0.2)
IMM GRANULOCYTES NFR BLD AUTO: 1 % (ref 0–2)
KETONES UR STRIP-MCNC: NEGATIVE MG/DL
LACTATE SERPL-SCNC: 1.3 MMOL/L (ref 0.5–2)
LDLC SERPL CALC-MCNC: 65 MG/DL (ref 0–100)
LEUKOCYTE ESTERASE UR QL STRIP: NEGATIVE
LYMPHOCYTES # BLD AUTO: 1.03 THOUSANDS/ΜL (ref 0.6–4.47)
LYMPHOCYTES NFR BLD AUTO: 9 % (ref 14–44)
MAGNESIUM SERPL-MCNC: 2.3 MG/DL (ref 1.6–2.6)
MCH RBC QN AUTO: 32.8 PG (ref 26.8–34.3)
MCHC RBC AUTO-ENTMCNC: 32.6 G/DL (ref 31.4–37.4)
MCV RBC AUTO: 101 FL (ref 82–98)
MONOCYTES # BLD AUTO: 0.79 THOUSAND/ΜL (ref 0.17–1.22)
MONOCYTES NFR BLD AUTO: 7 % (ref 4–12)
NEUTROPHILS # BLD AUTO: 9.44 THOUSANDS/ΜL (ref 1.85–7.62)
NEUTS SEG NFR BLD AUTO: 80 % (ref 43–75)
NITRITE UR QL STRIP: NEGATIVE
NON-SQ EPI CELLS URNS QL MICRO: ABNORMAL /HPF
NONHDLC SERPL-MCNC: 81 MG/DL
NRBC BLD AUTO-RTO: 0 /100 WBCS
PH UR STRIP.AUTO: 5.5 [PH] (ref 4.5–8)
PLATELET # BLD AUTO: 139 THOUSANDS/UL (ref 149–390)
PLATELET # BLD AUTO: 147 THOUSANDS/UL (ref 149–390)
PMV BLD AUTO: 10.9 FL (ref 8.9–12.7)
PMV BLD AUTO: 11.2 FL (ref 8.9–12.7)
POTASSIUM SERPL-SCNC: 3 MMOL/L (ref 3.5–5.3)
PROT SERPL-MCNC: 6.5 G/DL (ref 6.4–8.2)
PROT UR STRIP-MCNC: NEGATIVE MG/DL
RBC # BLD AUTO: 3.81 MILLION/UL (ref 3.88–5.62)
RBC #/AREA URNS AUTO: ABNORMAL /HPF
SODIUM SERPL-SCNC: 140 MMOL/L (ref 136–145)
SP GR UR STRIP.AUTO: 1.01 (ref 1–1.03)
TRIGL SERPL-MCNC: 78 MG/DL
TSH SERPL DL<=0.05 MIU/L-ACNC: 3.01 UIU/ML (ref 0.36–3.74)
UROBILINOGEN UR QL STRIP.AUTO: 0.2 E.U./DL
VIT B12 SERPL-MCNC: 496 PG/ML (ref 100–900)
WBC # BLD AUTO: 11.66 THOUSAND/UL (ref 4.31–10.16)
WBC #/AREA URNS AUTO: ABNORMAL /HPF

## 2022-04-03 PROCEDURE — 81001 URINALYSIS AUTO W/SCOPE: CPT

## 2022-04-03 PROCEDURE — 36415 COLL VENOUS BLD VENIPUNCTURE: CPT

## 2022-04-03 PROCEDURE — 80061 LIPID PANEL: CPT | Performed by: INTERNAL MEDICINE

## 2022-04-03 PROCEDURE — 83036 HEMOGLOBIN GLYCOSYLATED A1C: CPT | Performed by: INTERNAL MEDICINE

## 2022-04-03 PROCEDURE — 99285 EMERGENCY DEPT VISIT HI MDM: CPT

## 2022-04-03 PROCEDURE — 93005 ELECTROCARDIOGRAM TRACING: CPT

## 2022-04-03 PROCEDURE — 84484 ASSAY OF TROPONIN QUANT: CPT

## 2022-04-03 PROCEDURE — 80053 COMPREHEN METABOLIC PANEL: CPT

## 2022-04-03 PROCEDURE — 83605 ASSAY OF LACTIC ACID: CPT

## 2022-04-03 PROCEDURE — 96374 THER/PROPH/DIAG INJ IV PUSH: CPT

## 2022-04-03 PROCEDURE — 84443 ASSAY THYROID STIM HORMONE: CPT | Performed by: INTERNAL MEDICINE

## 2022-04-03 PROCEDURE — 83735 ASSAY OF MAGNESIUM: CPT | Performed by: INTERNAL MEDICINE

## 2022-04-03 PROCEDURE — 82607 VITAMIN B-12: CPT | Performed by: INTERNAL MEDICINE

## 2022-04-03 PROCEDURE — 85025 COMPLETE CBC W/AUTO DIFF WBC: CPT

## 2022-04-03 PROCEDURE — 99220 PR INITIAL OBSERVATION CARE/DAY 70 MINUTES: CPT | Performed by: INTERNAL MEDICINE

## 2022-04-03 PROCEDURE — 85049 AUTOMATED PLATELET COUNT: CPT | Performed by: INTERNAL MEDICINE

## 2022-04-03 PROCEDURE — 70450 CT HEAD/BRAIN W/O DYE: CPT

## 2022-04-03 PROCEDURE — G1004 CDSM NDSC: HCPCS

## 2022-04-03 PROCEDURE — 82948 REAGENT STRIP/BLOOD GLUCOSE: CPT

## 2022-04-03 PROCEDURE — 99285 EMERGENCY DEPT VISIT HI MDM: CPT | Performed by: EMERGENCY MEDICINE

## 2022-04-03 RX ORDER — CLOPIDOGREL BISULFATE 75 MG/1
75 TABLET ORAL DAILY
Status: DISCONTINUED | OUTPATIENT
Start: 2022-04-03 | End: 2022-04-07 | Stop reason: HOSPADM

## 2022-04-03 RX ORDER — ASPIRIN 81 MG/1
81 TABLET ORAL DAILY
Status: DISCONTINUED | OUTPATIENT
Start: 2022-04-03 | End: 2022-04-07 | Stop reason: HOSPADM

## 2022-04-03 RX ORDER — PRAVASTATIN SODIUM 20 MG
20 TABLET ORAL DAILY
Status: DISCONTINUED | OUTPATIENT
Start: 2022-04-03 | End: 2022-04-07 | Stop reason: HOSPADM

## 2022-04-03 RX ORDER — ACETAMINOPHEN 325 MG/1
650 TABLET ORAL EVERY 6 HOURS PRN
Status: DISCONTINUED | OUTPATIENT
Start: 2022-04-03 | End: 2022-04-07 | Stop reason: HOSPADM

## 2022-04-03 RX ORDER — CALCIUM CARBONATE 200(500)MG
1000 TABLET,CHEWABLE ORAL DAILY PRN
Status: DISCONTINUED | OUTPATIENT
Start: 2022-04-03 | End: 2022-04-07 | Stop reason: HOSPADM

## 2022-04-03 RX ORDER — HEPARIN SODIUM 5000 [USP'U]/ML
5000 INJECTION, SOLUTION INTRAVENOUS; SUBCUTANEOUS EVERY 8 HOURS SCHEDULED
Status: DISCONTINUED | OUTPATIENT
Start: 2022-04-03 | End: 2022-04-07 | Stop reason: HOSPADM

## 2022-04-03 RX ORDER — ISOSORBIDE MONONITRATE 30 MG/1
15 TABLET, EXTENDED RELEASE ORAL DAILY
Status: DISCONTINUED | OUTPATIENT
Start: 2022-04-03 | End: 2022-04-07 | Stop reason: HOSPADM

## 2022-04-03 RX ORDER — SENNOSIDES 8.6 MG
2 TABLET ORAL DAILY
Status: DISCONTINUED | OUTPATIENT
Start: 2022-04-03 | End: 2022-04-07 | Stop reason: HOSPADM

## 2022-04-03 RX ORDER — POTASSIUM CHLORIDE 14.9 MG/ML
20 INJECTION INTRAVENOUS
Status: COMPLETED | OUTPATIENT
Start: 2022-04-03 | End: 2022-04-03

## 2022-04-03 RX ORDER — ONDANSETRON 2 MG/ML
4 INJECTION INTRAMUSCULAR; INTRAVENOUS EVERY 6 HOURS PRN
Status: DISCONTINUED | OUTPATIENT
Start: 2022-04-03 | End: 2022-04-07 | Stop reason: HOSPADM

## 2022-04-03 RX ORDER — DOCUSATE SODIUM 100 MG/1
100 CAPSULE, LIQUID FILLED ORAL 2 TIMES DAILY
Status: DISCONTINUED | OUTPATIENT
Start: 2022-04-03 | End: 2022-04-07 | Stop reason: HOSPADM

## 2022-04-03 RX ORDER — FUROSEMIDE 20 MG/1
20 TABLET ORAL 2 TIMES DAILY
Status: DISCONTINUED | OUTPATIENT
Start: 2022-04-03 | End: 2022-04-04

## 2022-04-03 RX ADMIN — POTASSIUM CHLORIDE 20 MEQ: 14.9 INJECTION, SOLUTION INTRAVENOUS at 16:43

## 2022-04-03 RX ADMIN — HEPARIN SODIUM 5000 UNITS: 5000 INJECTION INTRAVENOUS; SUBCUTANEOUS at 23:00

## 2022-04-03 RX ADMIN — POTASSIUM CHLORIDE 20 MEQ: 14.9 INJECTION, SOLUTION INTRAVENOUS at 14:15

## 2022-04-03 RX ADMIN — SODIUM CHLORIDE 250 ML: 9 INJECTION, SOLUTION INTRAVENOUS at 12:06

## 2022-04-03 RX ADMIN — HEPARIN SODIUM 5000 UNITS: 5000 INJECTION INTRAVENOUS; SUBCUTANEOUS at 16:43

## 2022-04-03 NOTE — ASSESSMENT & PLAN NOTE
· Stable at baseline, denies any ischemic symptoms  · Continue aspirin, plavix, statin and metoprolol

## 2022-04-03 NOTE — ASSESSMENT & PLAN NOTE
· Unclear etiology  · PT is arousable and following simple commands and oriented x2, no clear focal deficits  · Check UA, TSH, B12, ammonia and RPR  · Given his new onset afib will check for CVA with an MRI brain  · Check lipid panel and a1c  · Permissive HTN  · Monitor neuro checks

## 2022-04-03 NOTE — ASSESSMENT & PLAN NOTE
Lab Results   Component Value Date    EGFR 26 04/03/2022    EGFR 24 03/06/2022    EGFR 24 03/06/2022    CREATININE 2 07 (H) 04/03/2022    CREATININE 2 18 (H) 03/06/2022    CREATININE 2 23 (H) 03/06/2022   · Pt is currently at his baseline creatinine  · Avoid NSAIDS and nephrotoxins  · Monitor BMP while inpatient

## 2022-04-03 NOTE — H&P
[facility] 12/6/1926, 80 y o  male MRN: [id number]  Unit/Bed#: ED 27 Encounter: [id number]  Primary Care Provider: Clint Tam DO   Date and time admitted to hospital: 4/3/2022 11:13 AM    * Encephalopathy acute  Assessment & Plan  · Unclear etiology  · PT is arousable and following simple commands and oriented x2, no clear focal deficits  · Check UA, TSH, B12, ammonia and RPR  · Given his new onset afib will check for CVA with an MRI brain  · Check lipid panel and a1c  · Permissive HTN  · Monitor neuro checks    A-fib ([facility] )  Assessment & Plan  · EKG shows new onset afib  · Rates are controlled  · Possibly the cause of his change in mental status, pt is not on anticoagulation  · Will hold AC at this time due to possible acute CVA  · Metoprolol for rate control  · Check echo    Hypokalemia  Assessment & Plan  · Likely due to poor po intake and lasix use    · Check Mg and supplement as needed  · Supplement potassium and monitor BMP while inpatient    Chronic renal disease, stage IV [facility])  Assessment & Plan  Lab Results   Component Value Date    EGFR 26 04/03/2022    EGFR 24 03/06/2022    EGFR 24 03/06/2022    CREATININE 2 07 (H) 04/03/2022    CREATININE 2 18 (H) 03/06/2022    CREATININE 2 23 (H) 03/06/2022   · Pt is currently at his baseline creatinine  · Avoid NSAIDS and nephrotoxins  · Monitor BMP while inpatient    Chronic combined systolic and diastolic heart failure (HCC)  Assessment & Plan  Wt Readings from Last 3 Encounters:   04/03/22 59 kg (130 lb)   03/10/22 60 4 kg (133 lb 1 6 oz)   03/06/22 58 7 kg (129 lb 6 6 oz)     · Weight appears to be at this baseline  · Monitor intake and output  · Monitor daily weights  · Low Na diet  · Continue lasix at current home dosage        Coronary artery disease  Assessment & Plan  · Stable at baseline, denies any ischemic symptoms  · Continue aspirin, plavix, statin and metoprolol        Hypertension  Assessment & Plan  · BP is acceptable  · Continue lasix and metoprolol    Hypercholesterolemia  Assessment & Plan  · Continue statin while inpatient    VTE Pharmacologic Prophylaxis:   Moderate Risk (Score 3-4) - Pharmacological DVT Prophylaxis Ordered: heparin  Code Status: Prior full    Anticipated Length of Stay: Patient will be admitted on an observation basis with an anticipated length of stay of less than 2 midnights secondary to encephalopathy  Total Time for Visit, including Counseling / Coordination of Care: 45 minutes Greater than 50% of this total time spent on direct patient counseling and coordination of care  Chief Complaint: change in mental status    History of Present Illness:  Rosario Kern is a 80 y o  male with a PMH of CAD who presents with change in mental status  PT was at his baseline this am for breakfast, went back to his room and at 10am he was checked on and found to be minimally responsive  He was evaluated in the ED and so far is only found to be in afib which appears new  PT denies any complaints and wants to go home       Review of Systems:  Review of Systems   All other systems reviewed and are negative  Past Medical and Surgical History:   Past Medical History:   Diagnosis Date    Chronic renal disease, stage III ([address] )     Chronic renal disease, stage III ([address] )     Essential hypertension, benign     Essential hypertension, benign        Past Surgical History:   Procedure Laterality Date    CORONARY ARTERY BYPASS GRAFT  2012    x4       Meds/Allergies:  Prior to Admission medications    Medication Sig Start Date End Date Taking?  Authorizing Provider   aspirin (ASPIRIN LOW DOSE) 81 mg EC tablet Take 1 tablet by mouth daily   Yes Historical Provider, MD   clopidogrel (Plavix) 75 mg tablet Take 1 tablet (75 mg total) by mouth daily 12/8/21  Yes Josh Koroma MD   furosemide (LASIX) 20 mg tablet Take 1 tablet (20 mg total) by mouth 2 (two) times a day May give an additional dose of 20 mg for 3lb weight gain in 24 hours, 5lbs in one week, worsening SOB, or edema    3/10/22  Yes SCOTTIE Hay   isosorbide mononitrate (IMDUR) 30 mg 24 hr tablet Take 0 5 tablets (15 mg total) by mouth daily 3/10/22  Yes SCOTTIE Hay   metoprolol tartrate (LOPRESSOR) 50 mg tablet Take 1 tablet (50 mg total) by mouth 2 (two) times a day  Patient taking differently: Take 25 mg by mouth 2 (two) times a day   1/17/22  Yes Juliano Jarquin MD   pravastatin (PRAVACHOL) 20 mg tablet Take 1 tablet (20 mg total) by mouth daily 12/8/21  Yes Juliano Jarquin MD   calcium carbonate (OS-VIRGEN) 1250 (500 Ca) MG tablet Take 600 mg by mouth  Patient not taking: Reported on 4/3/2022  1/5/12   Historical Provider, MD   Multiple Vitamin (multivitamin) capsule Take 1 capsule by mouth daily  Patient not taking: Reported on 4/3/2022     Historical Provider, MD   tamsulosin (FLOMAX) 0 4 mg Take 1 capsule (0 4 mg total) by mouth daily with dinner  Patient not taking: Reported on 4/3/2022  3/21/22   Von Oscar MD     I have reviewed home medications using recent Epic encounter  Allergies:    Allergies   Allergen Reactions    Loratadine     Nitrofurantoin     Penicillins     Phenazopyridine        Social History:  Marital Status: /Civil Union   Occupation: retired  Patient Pre-hospital Living Situation: Home  Patient Pre-hospital Level of Mobility: walks  Patient Pre-hospital Diet Restrictions: none  Substance Use History:   Social History     Substance and Sexual Activity   Alcohol Use Not Currently     Social History     Tobacco Use   Smoking Status Never Smoker   Smokeless Tobacco Never Used     Social History     Substance and Sexual Activity   Drug Use No       Family History:  Family History   Problem Relation Age of Onset    No Known Problems Mother     No Known Problems Father        Physical Exam:     Vitals:   Blood Pressure: 115/60 (04/03/22 1400)  Pulse: 74 (04/03/22 1400)  Temperature: 98 °F (36 7 °C) (04/03/22 1124)  Temp Source: Tympanic (04/03/22 1124)  Respirations: 16 (04/03/22 1400)  Weight - Scale: 59 kg (130 lb) (04/03/22 1124)  SpO2: 98 % (04/03/22 1400)    Physical Exam  Constitutional:       General: He is not in acute distress  Appearance: He is not toxic-appearing  Comments: Thin elderly male, lying in bed sleeping   HENT:      Nose: Nose normal       Mouth/Throat:      Mouth: Mucous membranes are moist       Pharynx: Oropharynx is clear  Eyes:      Extraocular Movements: Extraocular movements intact  Cardiovascular:      Rate and Rhythm: Normal rate  Rhythm irregular  Heart sounds: No murmur heard  No gallop  Pulmonary:      Effort: Pulmonary effort is normal       Breath sounds: No wheezing or rales  Abdominal:      Palpations: Abdomen is soft  Musculoskeletal:      Right lower leg: No edema  Left lower leg: No edema  Skin:     General: Skin is warm and dry  Neurological:      Mental Status: He is alert        Comments: Oriented x2  Speech is clear  Smile is symmetric  Tongue is midline  No pronator drift   Psychiatric:         Mood and Affect: Mood normal          Behavior: Behavior normal           Additional Data:     Lab Results:  Results from last 7 days   Lab Units 04/03/22  1206   WBC Thousand/uL 11 66*   HEMOGLOBIN g/dL 12 5   HEMATOCRIT % 38 3   PLATELETS Thousands/uL 147*   NEUTROS PCT % 80*   LYMPHS PCT % 9*   MONOS PCT % 7   EOS PCT % 2     Results from last 7 days   Lab Units 04/03/22  1206   SODIUM mmol/L 140   POTASSIUM mmol/L 3 0*   CHLORIDE mmol/L 104   CO2 mmol/L 28   BUN mg/dL 40*   CREATININE mg/dL 2 07*   ANION GAP mmol/L 8   CALCIUM mg/dL 8 9   ALBUMIN g/dL 3 2*   TOTAL BILIRUBIN mg/dL 0 57   ALK PHOS U/L 87   ALT U/L 38   AST U/L 18   GLUCOSE RANDOM mg/dL 98         Results from last 7 days   Lab Units 04/03/22  1206   POC GLUCOSE mg/dl 104         Results from last 7 days   Lab Units 04/03/22  1206   LACTIC ACID mmol/L 1 3       Imaging: Reviewed radiology reports from this admission including: CT head  CT head without contrast   Final Result by Myles Draper MD (04/03 1247)      No acute intracranial abnormality  Workstation performed: [id number]             EKG and Other Studies Reviewed on Admission:   · EKG: Atrial fibrillation  HR 62     ** Please Note: This note has been constructed using a voice recognition system   **

## 2022-04-03 NOTE — ED PROVIDER NOTES
History  Chief Complaint   Patient presents with    Altered Mental Status     Patient presents with AMS from [facility] facility, patient was last seen walking around 10 AM and then went back to room, put pajamas back on, and went back to bad  Patient refusing to speak  81 yo M w/ hx of CHF, CKD presents from assisted living facility for decreased responsiveness  Hx provided by patient's son at bedside  Patient woke up this morning and appeared to be in normal state of healthy  After breakfast pt went back to bed  At 10 am staff were unable to arouse him from sleep  Pt has not seemed ill lately but son reports that patient has had intermittent episodes of decreased responsiveness over the past 2 weeks  Prior to Admission Medications   Prescriptions Last Dose Informant Patient Reported? Taking? Multiple Vitamin (multivitamin) capsule Not Taking at Unknown time Child Yes No   Sig: Take 1 capsule by mouth daily   Patient not taking: Reported on 4/3/2022    aspirin (ASPIRIN LOW DOSE) 81 mg EC tablet 4/3/2022 at Unknown time Child Yes Yes   Sig: Take 1 tablet by mouth daily   calcium carbonate (OS-VIRGEN) 1250 (500 Ca) MG tablet Not Taking at Unknown time Child Yes No   Sig: Take 600 mg by mouth   Patient not taking: Reported on 4/3/2022    clopidogrel (Plavix) 75 mg tablet 4/3/2022 at Unknown time Child No Yes   Sig: Take 1 tablet (75 mg total) by mouth daily   furosemide (LASIX) 20 mg tablet 4/3/2022 at Unknown time  No Yes   Sig: Take 1 tablet (20 mg total) by mouth 2 (two) times a day May give an additional dose of 20 mg for 3lb weight gain in 24 hours, 5lbs in one week, worsening SOB, or edema       isosorbide mononitrate (IMDUR) 30 mg 24 hr tablet 4/3/2022 at Unknown time  No Yes   Sig: Take 0 5 tablets (15 mg total) by mouth daily   metoprolol tartrate (LOPRESSOR) 50 mg tablet 4/3/2022 at Unknown time Other (Specify) No Yes   Sig: Take 1 tablet (50 mg total) by mouth 2 (two) times a day   Patient taking differently: Take 25 mg by mouth 2 (two) times a day     pravastatin (PRAVACHOL) 20 mg tablet 4/2/2022 at Unknown time Child No Yes   Sig: Take 1 tablet (20 mg total) by mouth daily   tamsulosin (FLOMAX) 0 4 mg Not Taking at Unknown time  No No   Sig: Take 1 capsule (0 4 mg total) by mouth daily with dinner   Patient not taking: Reported on 4/3/2022       Facility-Administered Medications: None       Past Medical History:   Diagnosis Date    Chronic renal disease, stage III ([facility] [address] )     Chronic renal disease, stage III ([facility] [address] )     Essential hypertension, benign     Essential hypertension, benign        Past Surgical History:   Procedure Laterality Date    CORONARY ARTERY BYPASS GRAFT  2012    x4       Family History   Problem Relation Age of Onset    No Known Problems Mother     No Known Problems Father      I have reviewed and agree with the history as documented      E-Cigarette/Vaping    E-Cigarette Use Never User      E-Cigarette/Vaping Substances     Social History     Tobacco Use    Smoking status: Never Smoker    Smokeless tobacco: Never Used   Vaping Use    Vaping Use: Never used   Substance Use Topics    Alcohol use: Not Currently    Drug use: No        Review of Systems   Unable to perform ROS: Mental status change       Physical Exam  ED Triage Vitals   Temperature Pulse Respirations Blood Pressure SpO2   04/03/22 1124 04/03/22 1124 04/03/22 1124 04/03/22 1124 04/03/22 1124   98 °F (36 7 °C) 81 18 127/80 98 %      Temp Source Heart Rate Source Patient Position - Orthostatic VS BP Location FiO2 (%)   04/03/22 1124 04/03/22 1124 04/03/22 1200 04/03/22 1200 --   Tympanic Monitor Lying Right arm       Pain Score       04/03/22 1200       No Pain             Orthostatic Vital Signs  Vitals:    04/04/22 0222 04/04/22 0410 04/04/22 0725 04/04/22 1423   BP: 122/75 124/75 124/75 119/75   Pulse: 82 95 91 73   Patient Position - Orthostatic VS:           Physical Exam  Vitals and nursing note reviewed  Constitutional:       General: He is in acute distress  Appearance: He is ill-appearing  He is not toxic-appearing or diaphoretic  HENT:      Head: Normocephalic and atraumatic  Mouth/Throat:      Mouth: Mucous membranes are moist    Eyes:      General: No scleral icterus  Conjunctiva/sclera: Conjunctivae normal       Pupils: Pupils are equal, round, and reactive to light  Cardiovascular:      Rate and Rhythm: Normal rate  Rhythm irregular  Heart sounds: Normal heart sounds  No murmur heard  Pulmonary:      Effort: Pulmonary effort is normal  No respiratory distress  Breath sounds: Normal breath sounds  Abdominal:      General: There is no distension  Palpations: Abdomen is soft  There is no mass  Skin:     General: Skin is warm and dry  Capillary Refill: Capillary refill takes less than 2 seconds  Neurological:      Mental Status: He is lethargic  GCS: GCS eye subscore is 2  GCS verbal subscore is 4  GCS motor subscore is 6  Motor: Weakness present           ED Medications  Medications   aspirin (ECOTRIN LOW STRENGTH) EC tablet 81 mg (81 mg Oral Given 4/4/22 0957)   clopidogrel (PLAVIX) tablet 75 mg (75 mg Oral Given 4/4/22 0958)   furosemide (LASIX) tablet 20 mg (20 mg Oral Given 4/4/22 0957)   isosorbide mononitrate (IMDUR) 24 hr tablet 15 mg (15 mg Oral Given 4/4/22 1003)   metoprolol tartrate (LOPRESSOR) tablet 25 mg (25 mg Oral Given 4/4/22 0957)   pravastatin (PRAVACHOL) tablet 20 mg (20 mg Oral Given 4/4/22 0957)   acetaminophen (TYLENOL) tablet 650 mg (has no administration in time range)   docusate sodium (COLACE) capsule 100 mg (100 mg Oral Given 4/4/22 0957)   senna (SENOKOT) tablet 17 2 mg (17 2 mg Oral Given 4/4/22 0957)   ondansetron (ZOFRAN) injection 4 mg (has no administration in time range)   calcium carbonate (TUMS) chewable tablet 1,000 mg (has no administration in time range)   heparin (porcine) subcutaneous injection 5,000 Units (5,000 Units Subcutaneous Not Given 4/4/22 1400)   potassium chloride 20 mEq IVPB (premix) (has no administration in time range)   sodium chloride 0 9 % bolus 250 mL (0 mL Intravenous Stopped 4/3/22 1306)   potassium chloride 20 mEq IVPB (premix) (0 mEq Intravenous Stopped 4/3/22 1930)   magnesium sulfate 2 g/50 mL IVPB (premix) 2 g (2 g Intravenous New Bag 4/4/22 0958)       Diagnostic Studies  Results Reviewed     Procedure Component Value Units Date/Time    Basic metabolic panel [[id number]]  (Abnormal) Collected: 04/04/22 0503    Lab Status: Final result Specimen: Blood from Arm, Left Updated: 04/04/22 0701     Sodium 142 mmol/L      Potassium 3 3 mmol/L      Chloride 109 mmol/L      CO2 23 mmol/L      ANION GAP 10 mmol/L      BUN 35 mg/dL      Creatinine 1 81 mg/dL      Glucose 85 mg/dL      Glucose, Fasting 85 mg/dL      Calcium 9 0 mg/dL      eGFR 31 ml/min/1 73sq m     Narrative:      [place] guidelines for Chronic Kidney Disease (CKD):     Stage 1 with normal or high GFR (GFR > 90 mL/min/1 73 square meters)    Stage 2 Mild CKD (GFR = 60-89 mL/min/1 73 square meters)    Stage 3A Moderate CKD (GFR = 45-59 mL/min/1 73 square meters)    Stage 3B Moderate CKD (GFR = 30-44 mL/min/1 73 square meters)    Stage 4 Severe CKD (GFR = 15-29 mL/min/1 73 square meters)    Stage 5 End Stage CKD (GFR <15 mL/min/1 73 square meters)  Note: GFR calculation is accurate only with a steady state creatinine    Magnesium [[id number]]  (Normal) Collected: 04/04/22 0503    Lab Status: Final result Specimen: Blood from Arm, Left Updated: 04/04/22 0701     Magnesium 2 4 mg/dL     Phosphorus [[id number]]  (Normal) Collected: 04/04/22 0503    Lab Status: Final result Specimen: Blood from Arm, Left Updated: 04/04/22 0701     Phosphorus 3 0 mg/dL     Ammonia [[id number]]  (Normal) Collected: 04/04/22 0503    Lab Status: Final result Specimen: Blood from Arm, Left Updated: 04/04/22 0947 Ammonia 22 umol/L     CBC (With Platelets) [[id number]]  (Abnormal) Collected: 04/04/22 0503    Lab Status: Final result Specimen: Blood from Arm, Left Updated: 04/04/22 0642     WBC 8 27 Thousand/uL      RBC 3 67 Million/uL      Hemoglobin 12 1 g/dL      Hematocrit 36 6 %       fL      MCH 33 0 pg      MCHC 33 1 g/dL      RDW 13 6 %      Platelets 107 Thousands/uL      MPV 11 3 fL     RPR [[id number]] Collected: 04/04/22 0503    Lab Status: In process Specimen: Blood from Arm, Left Updated: 04/04/22 0620    HS Troponin I 4hr [[id number]]  (Normal) Collected: 04/03/22 1721    Lab Status: Final result Specimen: Blood from Arm, Right Updated: 04/03/22 1759     hs TnI 4hr 22 ng/L      Delta 4hr hsTnI -2 ng/L     Platelet count [[id number]]  (Abnormal) Collected: 04/03/22 1721    Lab Status: Final result Specimen: Blood from Arm, Right Updated: 04/03/22 1728     Platelets 602 Thousands/uL      MPV 10 9 fL     TSH, 3rd generation with Free T4 reflex [[id number]]  (Normal) Collected: 04/03/22 1206    Lab Status: Final result Specimen: Blood from Line, Venous Updated: 04/03/22 1550     TSH 3RD GENERATON 3 010 uIU/mL     Narrative:      Patients undergoing fluorescein dye angiography may retain small amounts of fluorescein in the body for 48-72 hours post procedure  Samples containing fluorescein can produce falsely depressed TSH values  If the patient had this procedure,a specimen should be resubmitted post fluorescein clearance        Vitamin B12 [[id number]]  (Normal) Collected: 04/03/22 1206    Lab Status: Final result Specimen: Blood from Line, Venous Updated: 04/03/22 1550     Vitamin B-12 496 pg/mL     Magnesium [[id number]]  (Normal) Collected: 04/03/22 1206    Lab Status: Final result Specimen: Blood from Line, Venous Updated: 04/03/22 1550     Magnesium 2 3 mg/dL     Lipid panel [[id number]] Collected: 04/03/22 1206    Lab Status: Final result Specimen: Blood from Line, Venous Updated: 04/03/22 1550     Cholesterol 131 mg/dL      Triglycerides 78 mg/dL      HDL, Direct 50 mg/dL      LDL Calculated 65 mg/dL      Non-HDL-Chol (CHOL-HDL) 81 mg/dl     Hemoglobin A1C [[id number]] Collected: 04/03/22 1206    Lab Status: Final result Specimen: Blood from Line, Venous Updated: 04/03/22 1537     Hemoglobin A1C 5 6 %       mg/dl     Urine Microscopic [[id number]]  (Abnormal) Collected: 04/03/22 1433    Lab Status: Final result Specimen: Urine, Indwelling Hodges Catheter Updated: 04/03/22 1513     RBC, UA 10-20 /hpf      WBC, UA 4-10 /hpf      Epithelial Cells Occasional /hpf      Bacteria, UA Moderate /hpf     HS Troponin I 2hr [[id number]]  (Normal) Collected: 04/03/22 1410    Lab Status: Final result Specimen: Blood from Line, Venous Updated: 04/03/22 1453     hs TnI 2hr 22 ng/L      Delta 2hr hsTnI -2 ng/L     Urine Macroscopic, POC [[id number]]  (Abnormal) Collected: 04/03/22 1433    Lab Status: Final result Specimen: Urine Updated: 04/03/22 1435     Color, UA Yellow     Clarity, UA Clear     pH, UA 5 5     Leukocytes, UA Negative     Nitrite, UA Negative     Protein, UA Negative mg/dl      Glucose, UA Negative mg/dl      Ketones, UA Negative mg/dl      Urobilinogen, UA 0 2 E U /dl      Bilirubin, UA Negative     Blood, UA Moderate     Specific [place], UA 1 015    Narrative:      CLINITEK RESULT    Lactic acid, plasma [[id number]]  (Normal) Collected: 04/03/22 1206    Lab Status: Final result Specimen: Blood from Line, Venous Updated: 04/03/22 1243     LACTIC ACID 1 3 mmol/L     Narrative:      Result may be elevated if tourniquet was used during collection      HS Troponin 0hr (reflex protocol) [[id number]]  (Normal) Collected: 04/03/22 1206    Lab Status: Final result Specimen: Blood from Line, Venous Updated: 04/03/22 1241     hs TnI 0hr 24 ng/L     Comprehensive metabolic panel [[id number]]  (Abnormal) Collected: 04/03/22 1206    Lab Status: Final result Specimen: Blood from Line, Venous Updated: 04/03/22 1233     Sodium 140 mmol/L      Potassium 3 0 mmol/L      Chloride 104 mmol/L      CO2 28 mmol/L      ANION GAP 8 mmol/L      BUN 40 mg/dL      Creatinine 2 07 mg/dL      Glucose 98 mg/dL      Calcium 8 9 mg/dL      Corrected Calcium 9 5 mg/dL      AST 18 U/L      ALT 38 U/L      Alkaline Phosphatase 87 U/L      Total Protein 6 5 g/dL      Albumin 3 2 g/dL      Total Bilirubin 0 57 mg/dL      eGFR 26 ml/min/1 73sq m     Narrative:      [employer] guidelines for Chronic Kidney Disease (CKD):     Stage 1 with normal or high GFR (GFR > 90 mL/min/1 73 square meters)    Stage 2 Mild CKD (GFR = 60-89 mL/min/1 73 square meters)    Stage 3A Moderate CKD (GFR = 45-59 mL/min/1 73 square meters)    Stage 3B Moderate CKD (GFR = 30-44 mL/min/1 73 square meters)    Stage 4 Severe CKD (GFR = 15-29 mL/min/1 73 square meters)    Stage 5 End Stage CKD (GFR <15 mL/min/1 73 square meters)  Note: GFR calculation is accurate only with a steady state creatinine    CBC and differential [[id number]]  (Abnormal) Collected: 04/03/22 1206    Lab Status: Final result Specimen: Blood from Line, Venous Updated: 04/03/22 1215     WBC 11 66 Thousand/uL      RBC 3 81 Million/uL      Hemoglobin 12 5 g/dL      Hematocrit 38 3 %       fL      MCH 32 8 pg      MCHC 32 6 g/dL      RDW 13 7 %      MPV 11 2 fL      Platelets 672 Thousands/uL      nRBC 0 /100 WBCs      Neutrophils Relative 80 %      Immat GRANS % 1 %      Lymphocytes Relative 9 %      Monocytes Relative 7 %      Eosinophils Relative 2 %      Basophils Relative 1 %      Neutrophils Absolute 9 44 Thousands/µL      Immature Grans Absolute 0 07 Thousand/uL      Lymphocytes Absolute 1 03 Thousands/µL      Monocytes Absolute 0 79 Thousand/µL      Eosinophils Absolute 0 21 Thousand/µL      Basophils Absolute 0 12 Thousands/µL     Fingerstick Glucose (POCT) [[id number]]  (Normal) Collected: 04/03/22 1206    Lab Status: Final result Updated: 04/03/22 1207     POC Glucose 104 mg/dl CT head without contrast   Final Result by Santosh Carroll MD (04/03 1247)      No acute intracranial abnormality  Workstation performed: [id number]         MRI brain wo contrast    (Results Pending)   MRA head wo contrast    (Results Pending)   MRA carotids wo contrast    (Results Pending)         Procedures  Procedures      ED Course               Identification of Seniors at Risk      Most Recent Value   (ISAR) Identification of Seniors at Risk    Before the illness or injury that brought you to the Emergency, did you need someone to help you on a regular basis? 1 Filed at: 04/03/2022 1124   In the last 24 hours, have you needed more help than usual? 0 Filed at: 04/03/2022 1124   Have you been hospitalized for one or more nights during the past 6 months? 0 Filed at: 04/03/2022 1124   In general, do you see well? 0 Filed at: 04/03/2022 1124   In general, do you have serious problems with your memory? 1 Filed at: 04/03/2022 1124   Do you take more than three different medications every day? 1 Filed at: 04/03/2022 1124   ISAR Score 3 Filed at: 04/03/2022 1124                    SBIRT 20yo+      Most Recent Value   SBIRT (23 yo +)    In order to provide better care to our patients, we are screening all of our patients for alcohol and drug use  Would it be okay to ask you these screening questions? Unable to answer at this time Filed at: 04/03/2022 1136                MDM  Number of Diagnoses or Management Options  Hypokalemia: new and requires workup  Somnolence: established and worsening  Diagnosis management comments: Impression: 81 yo M presents w/ acute onset change in mental status  VS normal  Sxs not likely infectious or cardiac  Suspect TIA due to acuity and waxing and waning of sxs  Plan: CTH, EKG, CBC, CMP, urine    Workup negative for source of sxs  Mental status waxing and waning in department   Admit to SLIM       Amount and/or Complexity of Data Reviewed  Clinical lab tests: ordered and reviewed  Tests in the radiology section of CPT®: ordered and reviewed  Obtain history from someone other than the patient: yes  Review and summarize past medical records: yes  Discuss the patient with other providers: yes  Independent visualization of images, tracings, or specimens: yes    Risk of Complications, Morbidity, and/or Mortality  Presenting problems: high  Diagnostic procedures: low  Management options: low    Patient Progress  Patient progress: stable      Disposition  Final diagnoses:   Somnolence   Hypokalemia     Time reflects when diagnosis was documented in both MDM as applicable and the Disposition within this note     Time User Action Codes Description Comment    4/3/2022  2:05 PM Vianca Brilliant Add [R40 0] Somnolence     4/3/2022  2:06 PM Vianca Brilliant Add [E87 6] Hypokalemia     4/3/2022  2:54 PM Vallie Cranker Add [G93 40] Encephalopathy acute     4/4/2022  1:42 PM Raj Rene Add [I48 91] Atrial fibrillation       ED Disposition     ED Disposition Condition Date/Time Comment    Admit Stable Sun Apr 3, 2022  2:40 PM Case was discussed with Dr Jayesh Guevara and the patient's admission status was agreed to be Admission Status: inpatient status to the service of Dr Jayesh Guevara  Follow-up Information    None         Current Discharge Medication List      CONTINUE these medications which have NOT CHANGED    Details   aspirin (ASPIRIN LOW DOSE) 81 mg EC tablet Take 1 tablet by mouth daily      clopidogrel (Plavix) 75 mg tablet Take 1 tablet (75 mg total) by mouth daily  Qty: 90 tablet, Refills: 3    Associated Diagnoses: Coronary artery disease involving native coronary artery of native heart without angina pectoris      furosemide (LASIX) 20 mg tablet Take 1 tablet (20 mg total) by mouth 2 (two) times a day May give an additional dose of 20 mg for 3lb weight gain in 24 hours, 5lbs in one week, worsening SOB, or edema   [place] Hedge: 90 tablet, Refills: 3    Associated Diagnoses: Coronary artery disease involving native coronary artery of native heart without angina pectoris      isosorbide mononitrate (IMDUR) 30 mg 24 hr tablet Take 0 5 tablets (15 mg total) by mouth daily  Qty: 30 tablet, Refills: 2    Associated Diagnoses: Atypical chest pain; Coronary artery disease involving native heart, unspecified vessel or lesion type, unspecified whether angina present      metoprolol tartrate (LOPRESSOR) 50 mg tablet Take 1 tablet (50 mg total) by mouth 2 (two) times a day  Qty: 180 tablet, Refills: 3    Associated Diagnoses: Coronary artery disease involving native coronary artery of native heart without angina pectoris      pravastatin (PRAVACHOL) 20 mg tablet Take 1 tablet (20 mg total) by mouth daily  Qty: 90 tablet, Refills: 3    Associated Diagnoses: Coronary artery disease involving native coronary artery of native heart without angina pectoris      calcium carbonate (OS-VIRGEN) 1250 (500 Ca) MG tablet Take 600 mg by mouth      Multiple Vitamin (multivitamin) capsule Take 1 capsule by mouth daily      tamsulosin (FLOMAX) 0 4 mg Take 1 capsule (0 4 mg total) by mouth daily with dinner  Qty: 30 capsule, Refills: 3    Associated Diagnoses: Benign prostatic hyperplasia with urinary frequency           No discharge procedures on file  PDMP Review     None           ED Provider  Attending physically available and evaluated Paulo Joaquin I managed the patient along with the ED Attending      Electronically Signed by         Khloe Mclean MD  04/04/22 3838

## 2022-04-03 NOTE — ASSESSMENT & PLAN NOTE
Wt Readings from Last 3 Encounters:   04/03/22 59 kg (130 lb)   03/10/22 60 4 kg (133 lb 1 6 oz)   03/06/22 58 7 kg (129 lb 6 6 oz)     · Weight appears to be at this baseline  · Monitor intake and output  · Monitor daily weights  · Low Na diet  · Continue lasix at current home dosage

## 2022-04-03 NOTE — ED ATTENDING ATTESTATION
4/3/2022  IFrannie DO, saw and evaluated the patient  I have discussed the patient with the resident/non-physician practitioner and agree with the resident's/non-physician practitioner's findings, Plan of Care, and MDM as documented in the resident's/non-physician practitioner's note, except where noted  All available labs and Radiology studies were reviewed  I was present for key portions of any procedure(s) performed by the resident/non-physician practitioner and I was immediately available to provide assistance  At this point I agree with the current assessment done in the Emergency Department  I have conducted an independent evaluation of this patient a history and physical is as follows: The patient is accompanied by his son  Patient lives at assisted living facility, normally able to ambulate okay, take care of himself, yesterday the son said he was doing fine  Staff at the facility reports that the patient got up and went to breakfast, went back to his room then about 9:30 a m  They went to go and check on him and he was minimally responsive  ED resident indicated that when he says the patient he was only able to say no and was very minimally responsive  Son indicates that after the ED resident assessed him and right before I came in, the patient was able to wake up, said he did not have any pain anywhere, lifted his arm up then lowered back down  Seemed to be more alert but still not anywhere at his baseline  Right now the patient says he has no pain anywhere, has no headache, no neck pain, no dysuria hematuria knows he is in the hospital and knows his name, can not recognize his son  He does not remember being assessed earlier by the ED resident or coming to the hospital in the ambulance      I assessed the patient at 12:00 p m ,  General:  Patient is well-appearing  Head:  Atraumatic  Eyes:  Conjunctiva pink, pupils are equal reactive to light, extra muscles are intact  ENT: Mucous membranes are moist  Neck:  Supple  Cardiac:  S1-S2, without murmurs  Lungs:  Clear to auscultation bilaterally  Abdomen:  Soft, nontender, normal bowel sounds, no CVA tenderness, no tympany, no rigidity, no guarding  Extremities:  Normal range of motion to passive range of motion  Neurologic:  Eyes open, awake, oriented to person and place, does not know the exact year, he can smile, cranial nerves 2-12 are intact, normal sensation at the whole body, able to squeeze my fingers lightly and plantar flex and dorsiflex at the ankle and toes slightly, his strength is 1/5 in the bilateral arms and legs  Skin:  Pink warm and dry  Psychiatric:  cooperative            ED Course     ECG interpreted by me, atrial fibrillation, left bundle-branch block some left axis deviation, no rapid ventricular response, ventricular rate between 80 and 100, no acute change from March 6, 2022    CT head without contrast   Final Result      No acute intracranial abnormality                    Workstation performed: [id number]         MRI inpatient order    (Results Pending)       Labs Reviewed   CBC AND DIFFERENTIAL - Abnormal       Result Value Ref Range Status    WBC 11 66 (*) 4 31 - 10 16 Thousand/uL Final    RBC 3 81 (*) 3 88 - 5 62 Million/uL Final    Hemoglobin 12 5  12 0 - 17 0 g/dL Final    Hematocrit 38 3  36 5 - 49 3 % Final     (*) 82 - 98 fL Final    MCH 32 8  26 8 - 34 3 pg Final    MCHC 32 6  31 4 - 37 4 g/dL Final    RDW 13 7  11 6 - 15 1 % Final    MPV 11 2  8 9 - 12 7 fL Final    Platelets 683 (*) 740 - 390 Thousands/uL Final    nRBC 0  /100 WBCs Final    Neutrophils Relative 80 (*) 43 - 75 % Final    Immat GRANS % 1  0 - 2 % Final    Lymphocytes Relative 9 (*) 14 - 44 % Final    Monocytes Relative 7  4 - 12 % Final    Eosinophils Relative 2  0 - 6 % Final    Basophils Relative 1  0 - 1 % Final    Neutrophils Absolute 9 44 (*) 1 85 - 7 62 Thousands/µL Final    Immature Grans Absolute 0 07  0 00 - 0 20 Thousand/uL Final    Lymphocytes Absolute 1 03  0 60 - 4 47 Thousands/µL Final    Monocytes Absolute 0 79  0 17 - 1 22 Thousand/µL Final    Eosinophils Absolute 0 21  0 00 - 0 61 Thousand/µL Final    Basophils Absolute 0 12 (*) 0 00 - 0 10 Thousands/µL Final   COMPREHENSIVE METABOLIC PANEL - Abnormal    Sodium 140  136 - 145 mmol/L Final    Potassium 3 0 (*) 3 5 - 5 3 mmol/L Final    Chloride 104  100 - 108 mmol/L Final    CO2 28  21 - 32 mmol/L Final    ANION GAP 8  4 - 13 mmol/L Final    BUN 40 (*) 5 - 25 mg/dL Final    Creatinine 2 07 (*) 0 60 - 1 30 mg/dL Final    Comment: Standardized to IDMS reference method    Glucose 98  65 - 140 mg/dL Final    Comment: If the patient is fasting, the ADA then defines impaired fasting glucose as > 100 mg/dL and diabetes as > or equal to 123 mg/dL  Specimen collection should occur prior to Sulfasalazine administration due to the potential for falsely depressed results  Specimen collection should occur prior to Sulfapyridine administration due to the potential for falsely elevated results  Calcium 8 9  8 3 - 10 1 mg/dL Final    Corrected Calcium 9 5  8 3 - 10 1 mg/dL Final    AST 18  5 - 45 U/L Final    Comment: Specimen collection should occur prior to Sulfasalazine administration due to the potential for falsely depressed results  ALT 38  12 - 78 U/L Final    Comment: Specimen collection should occur prior to Sulfasalazine and/or Sulfapyridine administration due to the potential for falsely depressed results  Alkaline Phosphatase 87  46 - 116 U/L Final    Total Protein 6 5  6 4 - 8 2 g/dL Final    Albumin 3 2 (*) 3 5 - 5 0 g/dL Final    Total Bilirubin 0 57  0 20 - 1 00 mg/dL Final    Comment: Use of this assay is not recommended for patients undergoing treatment with eltrombopag due to the potential for falsely elevated results      eGFR 26  ml/min/1 73sq m Final    Narrative:     [place] guidelines for Chronic Kidney Disease (CKD):    Stage 1 with normal or high GFR (GFR > 90 mL/min/1 73 square meters)    Stage 2 Mild CKD (GFR = 60-89 mL/min/1 73 square meters)    Stage 3A Moderate CKD (GFR = 45-59 mL/min/1 73 square meters)    Stage 3B Moderate CKD (GFR = 30-44 mL/min/1 73 square meters)    Stage 4 Severe CKD (GFR = 15-29 mL/min/1 73 square meters)    Stage 5 End Stage CKD (GFR <15 mL/min/1 73 square meters)  Note: GFR calculation is accurate only with a steady state creatinine   URINE MACROSCOPIC, POC - Abnormal    Color, UA Yellow   Final    Clarity, UA Clear   Final    pH, UA 5 5  4 5 - 8 0 Final    Leukocytes, UA Negative  Negative Final    Nitrite, UA Negative  Negative Final    Protein, UA Negative  Negative mg/dl Final    Glucose, UA Negative  Negative mg/dl Final    Ketones, UA Negative  Negative mg/dl Final    Urobilinogen, UA 0 2  0 2, 1 0 E U /dl E U /dl Final    Bilirubin, UA Negative  Negative Final    Blood, UA Moderate (*) Negative Final    Specific [place], UA 1 015  1 003 - 1 030 Final    Narrative:     CLINITEK RESULT   LACTIC ACID, PLASMA - Normal    LACTIC ACID 1 3  0 5 - 2 0 mmol/L Final    Narrative:     Result may be elevated if tourniquet was used during collection  HS TROPONIN I 0HR - Normal    hs TnI 0hr 24  "Refer to ACS Flowchart"- see link ng/L Final    Comment:                                              Initial (time 0) result  If >=50 ng/L, Myocardial injury suggested ;  Type of myocardial injury and treatment strategy  to be determined  If 5-49 ng/L, a delta result at 2 hours and or 4 hours will be needed to further evaluate  If <4 ng/L, and chest pain has been >3 hours since onset, patient may qualify for discharge based on the HEART score in the ED  If <5 ng/L and <3hours since onset of chest pain, a delta result at 2 hours will be needed to further evaluate  HS Troponin 99th Percentile URL of a Health Population=12 ng/L with a 95% Confidence Interval of 8-18 ng/L      Second Troponin (time 2 hours)  If calculated delta >= 20 ng/L,  Myocardial injury suggested ; Type of myocardial injury and treatment strategy to be determined  If 5-49 ng/L and the calculated delta is 5-19 ng/L, consult medical service for evaluation  Continue evaluation for ischemia on ecg and other possible etiology and repeat hs troponin at 4 hours  If delta is <5 ng/L at 2 hours, consider discharge based on risk stratification via the HEART score (if in ED), or KASSY risk score in IP/Observation  HS Troponin 99th Percentile URL of a Health Population=12 ng/L with a 95% Confidence Interval of 8-18 ng/L    HS TROPONIN I 2HR - Normal    hs TnI 2hr 22  "Refer to ACS Flowchart"- see link ng/L Final    Comment:                                              Initial (time 0) result  If >=50 ng/L, Myocardial injury suggested ;  Type of myocardial injury and treatment strategy  to be determined  If 5-49 ng/L, a delta result at 2 hours and or 4 hours will be needed to further evaluate  If <4 ng/L, and chest pain has been >3 hours since onset, patient may qualify for discharge based on the HEART score in the ED  If <5 ng/L and <3hours since onset of chest pain, a delta result at 2 hours will be needed to further evaluate  HS Troponin 99th Percentile URL of a Health Population=12 ng/L with a 95% Confidence Interval of 8-18 ng/L  Second Troponin (time 2 hours)  If calculated delta >= 20 ng/L,  Myocardial injury suggested ; Type of myocardial injury and treatment strategy to be determined  If 5-49 ng/L and the calculated delta is 5-19 ng/L, consult medical service for evaluation  Continue evaluation for ischemia on ecg and other possible etiology and repeat hs troponin at 4 hours  If delta is <5 ng/L at 2 hours, consider discharge based on risk stratification via the HEART score (if in ED), or KASSY risk score in IP/Observation      HS Troponin 99th Percentile URL of a Health Population=12 ng/L with a 95% Confidence Interval of 8-18 ng/L     Delta 2hr hsTnI -2  <20 ng/L Final   POCT GLUCOSE - Normal    POC Glucose 104  65 - 140 mg/dl Final   HS TROPONIN I 4HR   URINE MICROSCOPIC   TSH, 3RD GENERATION WITH FREE T4 REFLEX   VITAMIN B12   MAGNESIUM   HEMOGLOBIN A1C   LIPID PANEL   PLATELET COUNT       The cause of the patient's altered mental status is unclear  Afebrile, no headache, admitted for further workup    Critical Care Time  Procedures

## 2022-04-03 NOTE — ASSESSMENT & PLAN NOTE
· Likely due to poor po intake and lasix use    · Check Mg and supplement as needed  · Supplement potassium and monitor BMP while inpatient

## 2022-04-03 NOTE — ED NOTES
Dr Param Pace at the bedside for patient evaluation at this time     Nancy Zelaya, RN  04/03/22 1541

## 2022-04-03 NOTE — ASSESSMENT & PLAN NOTE
· EKG shows new onset afib  · Rates are controlled  · Possibly the cause of his change in mental status, pt is not on anticoagulation    · Will hold AC at this time due to possible acute CVA  · Metoprolol for rate control  · Check echo

## 2022-04-04 ENCOUNTER — APPOINTMENT (OUTPATIENT)
Dept: RADIOLOGY | Facility: HOSPITAL | Age: 87
DRG: 070 | End: 2022-04-04
Payer: MEDICARE

## 2022-04-04 ENCOUNTER — APPOINTMENT (OUTPATIENT)
Dept: NEUROLOGY | Facility: CLINIC | Age: 87
DRG: 070 | End: 2022-04-04
Payer: MEDICARE

## 2022-04-04 ENCOUNTER — TELEPHONE (OUTPATIENT)
Dept: RADIOLOGY | Facility: HOSPITAL | Age: 87
End: 2022-04-04

## 2022-04-04 PROBLEM — E43 SEVERE PROTEIN-CALORIE MALNUTRITION (HCC): Status: ACTIVE | Noted: 2022-04-04

## 2022-04-04 PROBLEM — D69.6 THROMBOCYTOPENIA (HCC): Status: ACTIVE | Noted: 2022-04-04

## 2022-04-04 PROBLEM — D72.829 LEUKOCYTOSIS: Status: ACTIVE | Noted: 2022-04-04

## 2022-04-04 LAB
AMMONIA PLAS-SCNC: 22 UMOL/L (ref 11–35)
ANION GAP SERPL CALCULATED.3IONS-SCNC: 10 MMOL/L (ref 4–13)
BUN SERPL-MCNC: 35 MG/DL (ref 5–25)
CALCIUM SERPL-MCNC: 9 MG/DL (ref 8.3–10.1)
CHLORIDE SERPL-SCNC: 109 MMOL/L (ref 100–108)
CO2 SERPL-SCNC: 23 MMOL/L (ref 21–32)
CREAT SERPL-MCNC: 1.81 MG/DL (ref 0.6–1.3)
ERYTHROCYTE [DISTWIDTH] IN BLOOD BY AUTOMATED COUNT: 13.6 % (ref 11.6–15.1)
GFR SERPL CREATININE-BSD FRML MDRD: 31 ML/MIN/1.73SQ M
GLUCOSE P FAST SERPL-MCNC: 85 MG/DL (ref 65–99)
GLUCOSE SERPL-MCNC: 85 MG/DL (ref 65–140)
HCT VFR BLD AUTO: 36.6 % (ref 36.5–49.3)
HGB BLD-MCNC: 12.1 G/DL (ref 12–17)
MAGNESIUM SERPL-MCNC: 2.4 MG/DL (ref 1.6–2.6)
MCH RBC QN AUTO: 33 PG (ref 26.8–34.3)
MCHC RBC AUTO-ENTMCNC: 33.1 G/DL (ref 31.4–37.4)
MCV RBC AUTO: 100 FL (ref 82–98)
PHOSPHATE SERPL-MCNC: 3 MG/DL (ref 2.3–4.1)
PLATELET # BLD AUTO: 143 THOUSANDS/UL (ref 149–390)
PMV BLD AUTO: 11.3 FL (ref 8.9–12.7)
POTASSIUM SERPL-SCNC: 3.3 MMOL/L (ref 3.5–5.3)
RBC # BLD AUTO: 3.67 MILLION/UL (ref 3.88–5.62)
SODIUM SERPL-SCNC: 142 MMOL/L (ref 136–145)
WBC # BLD AUTO: 8.27 THOUSAND/UL (ref 4.31–10.16)

## 2022-04-04 PROCEDURE — 99223 1ST HOSP IP/OBS HIGH 75: CPT | Performed by: INTERNAL MEDICINE

## 2022-04-04 PROCEDURE — 97163 PT EVAL HIGH COMPLEX 45 MIN: CPT

## 2022-04-04 PROCEDURE — 70544 MR ANGIOGRAPHY HEAD W/O DYE: CPT

## 2022-04-04 PROCEDURE — 82140 ASSAY OF AMMONIA: CPT | Performed by: INTERNAL MEDICINE

## 2022-04-04 PROCEDURE — G1004 CDSM NDSC: HCPCS

## 2022-04-04 PROCEDURE — 83735 ASSAY OF MAGNESIUM: CPT | Performed by: INTERNAL MEDICINE

## 2022-04-04 PROCEDURE — 86592 SYPHILIS TEST NON-TREP QUAL: CPT | Performed by: INTERNAL MEDICINE

## 2022-04-04 PROCEDURE — 95816 EEG AWAKE AND DROWSY: CPT | Performed by: PSYCHIATRY & NEUROLOGY

## 2022-04-04 PROCEDURE — 99226 PR SBSQ OBSERVATION CARE/DAY 35 MINUTES: CPT | Performed by: INTERNAL MEDICINE

## 2022-04-04 PROCEDURE — 70551 MRI BRAIN STEM W/O DYE: CPT

## 2022-04-04 PROCEDURE — 99223 1ST HOSP IP/OBS HIGH 75: CPT | Performed by: PHYSICIAN ASSISTANT

## 2022-04-04 PROCEDURE — 85027 COMPLETE CBC AUTOMATED: CPT | Performed by: INTERNAL MEDICINE

## 2022-04-04 PROCEDURE — 95816 EEG AWAKE AND DROWSY: CPT

## 2022-04-04 PROCEDURE — 80048 BASIC METABOLIC PNL TOTAL CA: CPT | Performed by: INTERNAL MEDICINE

## 2022-04-04 PROCEDURE — 70547 MR ANGIOGRAPHY NECK W/O DYE: CPT

## 2022-04-04 PROCEDURE — 97167 OT EVAL HIGH COMPLEX 60 MIN: CPT

## 2022-04-04 PROCEDURE — 84100 ASSAY OF PHOSPHORUS: CPT | Performed by: INTERNAL MEDICINE

## 2022-04-04 RX ORDER — FUROSEMIDE 20 MG/1
20 TABLET ORAL DAILY
Status: DISCONTINUED | OUTPATIENT
Start: 2022-04-05 | End: 2022-04-06

## 2022-04-04 RX ORDER — POTASSIUM CHLORIDE 14.9 MG/ML
20 INJECTION INTRAVENOUS
Status: COMPLETED | OUTPATIENT
Start: 2022-04-04 | End: 2022-04-04

## 2022-04-04 RX ORDER — MAGNESIUM SULFATE HEPTAHYDRATE 40 MG/ML
2 INJECTION, SOLUTION INTRAVENOUS ONCE
Status: COMPLETED | OUTPATIENT
Start: 2022-04-04 | End: 2022-04-04

## 2022-04-04 RX ORDER — POTASSIUM CHLORIDE 14.9 MG/ML
20 INJECTION INTRAVENOUS
Status: DISPENSED | OUTPATIENT
Start: 2022-04-04 | End: 2022-04-04

## 2022-04-04 RX ADMIN — HEPARIN SODIUM 5000 UNITS: 5000 INJECTION INTRAVENOUS; SUBCUTANEOUS at 22:00

## 2022-04-04 RX ADMIN — METOPROLOL TARTRATE 25 MG: 25 TABLET, FILM COATED ORAL at 17:32

## 2022-04-04 RX ADMIN — FUROSEMIDE 20 MG: 20 TABLET ORAL at 17:32

## 2022-04-04 RX ADMIN — SENNOSIDES 17.2 MG: 8.6 TABLET, FILM COATED ORAL at 09:57

## 2022-04-04 RX ADMIN — CLOPIDOGREL BISULFATE 75 MG: 75 TABLET ORAL at 09:58

## 2022-04-04 RX ADMIN — HEPARIN SODIUM 5000 UNITS: 5000 INJECTION INTRAVENOUS; SUBCUTANEOUS at 05:04

## 2022-04-04 RX ADMIN — ASPIRIN 81 MG: 81 TABLET, COATED ORAL at 09:57

## 2022-04-04 RX ADMIN — POTASSIUM CHLORIDE 20 MEQ: 14.9 INJECTION, SOLUTION INTRAVENOUS at 10:00

## 2022-04-04 RX ADMIN — FUROSEMIDE 20 MG: 20 TABLET ORAL at 09:57

## 2022-04-04 RX ADMIN — MAGNESIUM SULFATE HEPTAHYDRATE 2 G: 2 INJECTION, SOLUTION INTRAVENOUS at 09:58

## 2022-04-04 RX ADMIN — METOPROLOL TARTRATE 25 MG: 25 TABLET, FILM COATED ORAL at 09:57

## 2022-04-04 RX ADMIN — PRAVASTATIN SODIUM 20 MG: 20 TABLET ORAL at 09:57

## 2022-04-04 RX ADMIN — ISOSORBIDE MONONITRATE 15 MG: 30 TABLET, EXTENDED RELEASE ORAL at 10:03

## 2022-04-04 RX ADMIN — DOCUSATE SODIUM 100 MG: 100 CAPSULE, LIQUID FILLED ORAL at 09:57

## 2022-04-04 RX ADMIN — DOCUSATE SODIUM 100 MG: 100 CAPSULE, LIQUID FILLED ORAL at 17:32

## 2022-04-04 RX ADMIN — POTASSIUM CHLORIDE 20 MEQ: 14.9 INJECTION, SOLUTION INTRAVENOUS at 20:03

## 2022-04-04 NOTE — PLAN OF CARE
Problem: PHYSICAL THERAPY ADULT  Goal: Performs mobility at highest level of function for planned discharge setting  See evaluation for individualized goals  Description: Treatment/Interventions: Functional transfer training,Cognitive reorientation,Patient/family training,Endurance training,Equipment eval/education,Bed mobility,Spoke to nursing,OT  Equipment Recommended: Naomy White       See flowsheet documentation for full assessment, interventions and recommendations  Note: Prognosis: Fair  Problem List: Decreased strength,Decreased range of motion,Decreased endurance,Impaired balance,Decreased mobility,Decreased cognition,Impaired judgement,Decreased safety awareness  Assessment: Pt seen for high complexity PT evaluation to assess potential discharge needs  Pt has active PT eval/treatment orders as well as up with assistance orders  Pt is a 81 y/o male admitted on 4/3/2022 with altered mental status and decreased responsiveness  Comorbidities affecting pt upon evaluation are as follows: acute encephalopathy, systolic and diastolic heart failure, stage IV renal disease, CAD, HTN, and Afib  Pt  has a past medical history of Chronic renal disease, stage III ([facility] [address] ), Chronic renal disease, stage III ([facility] [address] ), Essential hypertension, benign, and Essential hypertension, benign  Personal factors affecting patient at time of initial evaluation include inability to perform ADLs, advanced age, and limited home support  Prior to admission, patient was residing in an assisted living facility and was performing ADLs independently and IADLS with assistance  Upon evaluation, they have current limitations with strength, endurance, balance, range of motion, safety awareness, and overall functional mobility, putting patient at increased risk for falls  Pt currently performing bed mobility tasks with mod assist x2, functional transfers with mod assist x2, and ambulation with mod assist x2 and RW   Pt demonstrates B/L UE and LE shaking and postural instability in standing - RN updated following session  Pt would benefit from continued PT services while in hospital to address the remaining deficits  PT to continue to follow pt and recommends rehab upon discharge  The patient's AM-PAC Basic Mobility Inpatient Short Form Raw Score is 6  A Raw score of less than or equal to 16 suggests the patient may benefit from discharge to post-acute rehabilitation services  Please refer to the recommendation of the Physical Therapist for safe discharge planning  Barriers to Discharge: Decreased caregiver support        PT Discharge Recommendation: Post acute rehabilitation services          See flowsheet documentation for full assessment

## 2022-04-04 NOTE — ASSESSMENT & PLAN NOTE
Lab Results   Component Value Date    EGFR 31 04/04/2022    EGFR 26 04/03/2022    EGFR 24 03/06/2022    CREATININE 1 81 (H) 04/04/2022    CREATININE 2 07 (H) 04/03/2022    CREATININE 2 18 (H) 03/06/2022     - Medical management per primary team

## 2022-04-04 NOTE — ASSESSMENT & PLAN NOTE
As noted on admission  Rate controlled on Lopressor - not chronically on anticoagulation, however, on dual anti-platelet therapy with ASA/Plavix  In the setting of altered mentation on presentation, stroke workup ongoing  Echocardiogram earlier this year revealed mild biatrial dilatation  Will appreciate cardiology input

## 2022-04-04 NOTE — ASSESSMENT & PLAN NOTE
80year old male with history of stage 4 CKD, HTN, CHF, poor cardiac function/cardiomyopathy with EF 25% other recent admissions (February/March) presenting from assisted living facility on 04/03 after being found minimally responsive by staff  Exam improved while in ED (began speaking more, followed commands), was somewhat amnestic to events earlier in the morning  Son notes few similar spells like this over the past few weeks  Noted with new onset a-fib on cardiac monitoring  Unclear etiology at this time  Suspect TME in the setting of underlying cognitive impairment vs depression in the setting of his wife recently moving out to a facility vs delirium  MRI brain without acute infarct noted  Per discussion with attending neurologist, MRA head findings most likely secondary to motion artifact  Routine EEG without epileptiform discharges or electrographic seizures noted  Urine microscopic with moderate bacteria noted  Plan:  - DAPT with aspirin 81 mg and Plavix 75 mg  - Pravastatin 20 mg  - Okay to start anticoagulation immediately in the setting of Afib from neurology standpoint; [facility]  - Telemetry  - Monitor neuro exam; notify with any changes  - PT/OT/ST  - Delirium precautions  - Promote appropriate sleep/wake cycle  - Medical management and supportive care per primary team  Correction of metabolic or infectious disturbances   - No further inpatient neurology recommendations at this time  Please call with any questions  Results:  - CT head: No acute intracranial abnormality   - MRI brain:  1  No acute infarction, intracranial hemorrhage or mass effect  2   Mild, chronic microangiopathy   - MRA head:  1  Poorly visualized midportion of the M1 segment of the right middle cerebral artery, nonvisualized basilar artery with poor flow related enhancement of both vertebral arteries as well as both posterior cerebral arteries     Findings may be related to markedly slow flow and/or vascular occlusion/high-grade stenosis  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

## 2022-04-04 NOTE — ASSESSMENT & PLAN NOTE
Likely multifactorial secondary to oral intake and chronic diuretic use  Monitor/replete potassium and magnesium as necessary

## 2022-04-04 NOTE — CONSULTS
Consultation - General Cardiology Team [address] [facility] y o  male MRN: [id number]  Unit/Bed#: [facility] 905-01 Encounter: [id number]      Inpatient consult to Cardiology  Consult performed by: Denise Cotto MD  Consult ordered by: Debora Verduzco MD        PCP: Tammi Salomon,      History of Present Illness   Physician Requesting Consult: Debora Verduzco MD  Reason for Consult / Principal Problem: ?New Onset AFib    HPI: Cassidy Kenny is a 80y o  year old male with a history of CKD3, HFrEF (25%) 2/2 ICM with a prior CABG and HLD//HTN who presented on 4/3/22 with mental status change while at [facility]  Was having breakfast in his usual state of health and he went back to his room, family found him to be minimally responsive and brought to the ED for further evaluation  Patient was noted to have atrial fibrillation while in the ED which is suspected to be new onset  CT Head on admission was negative for acute intracranial pathology  Of note as per documentation, as per patients son, patient has been slightly declining over past few months  Son has noted multiple episodes over the past few weeks of altered mental status where patient will stare off aimless and not answer questions for a few seconds before coming to  Extensive discussion had with son at bedside who corroborated the aforementioned  Review of Systems  Review of system was conducted and was negative except for as stated in the HPI        Historical Information   Past Medical History:   Diagnosis Date    Chronic renal disease, stage III ([address] )     Chronic renal disease, stage III ([address] )     Essential hypertension, benign     Essential hypertension, benign      Past Surgical History:   Procedure Laterality Date    CORONARY ARTERY BYPASS GRAFT  2012    x4     Social History     Substance and Sexual Activity   Alcohol Use Not Currently     Social History     Substance and Sexual Activity   Drug Use No     Social History     Tobacco Use   Smoking Status Never Smoker   Smokeless Tobacco Never Used     Family History:   Family History   Problem Relation Age of Onset    No Known Problems Mother     No Known Problems Father        Meds/Allergies   Hospital Medications:   Current Facility-Administered Medications   Medication Dose Route Frequency    acetaminophen (TYLENOL) tablet 650 mg  650 mg Oral Q6H PRN    aspirin (ECOTRIN LOW STRENGTH) EC tablet 81 mg  81 mg Oral Daily    calcium carbonate (TUMS) chewable tablet 1,000 mg  1,000 mg Oral Daily PRN    clopidogrel (PLAVIX) tablet 75 mg  75 mg Oral Daily    docusate sodium (COLACE) capsule 100 mg  100 mg Oral BID    furosemide (LASIX) tablet 20 mg  20 mg Oral BID    heparin (porcine) subcutaneous injection 5,000 Units  5,000 Units Subcutaneous Q8H [facility]    isosorbide mononitrate (IMDUR) 24 hr tablet 15 mg  15 mg Oral Daily    metoprolol tartrate (LOPRESSOR) tablet 25 mg  25 mg Oral BID    ondansetron (ZOFRAN) injection 4 mg  4 mg Intravenous Q6H PRN    pravastatin (PRAVACHOL) tablet 20 mg  20 mg Oral Daily    senna (SENOKOT) tablet 17 2 mg  2 tablet Oral Daily     Home Medications:   Medications Prior to Admission   Medication    aspirin (ASPIRIN LOW DOSE) 81 mg EC tablet    clopidogrel (Plavix) 75 mg tablet    furosemide (LASIX) 20 mg tablet    isosorbide mononitrate (IMDUR) 30 mg 24 hr tablet    metoprolol tartrate (LOPRESSOR) 50 mg tablet    pravastatin (PRAVACHOL) 20 mg tablet    calcium carbonate (OS-VIRGEN) 1250 (500 Ca) MG tablet    Multiple Vitamin (multivitamin) capsule    tamsulosin (FLOMAX) 0 4 mg       Allergies   Allergen Reactions    Loratadine     Nitrofurantoin     Penicillins     Phenazopyridine        Objective   Vitals: Blood pressure 119/75, pulse 73, temperature 98 9 °F (37 2 °C), resp  rate 18, weight 59 kg (130 lb), SpO2 96 %    Orthostatic Blood Pressures      Most Recent Value   Blood Pressure 119/75 filed at 04/04/2022 1423   Patient Position - Orthostatic VS Lying filed at 04/03/2022 1500            Invasive Devices  Report    Peripheral Intravenous Line            Peripheral IV 04/03/22 Distal;Dorsal (posterior); Left Forearm 1 day          Drain            Urethral Catheter 16 Fr  1 day                Physical Exam  GEN: Amanda Boyle appears well, alert and oriented x 3, pleasant and cooperative   HEENT:  Normocephalic, atraumatic, anicteric, moist mucous membranes  NECK: No JVD or carotid bruits   HEART: irreg rhythm, irreg rate, normal S1 and S2, no murmurs, clicks, gallops or rubs   LUNGS: Clear to auscultation bilaterally; no wheezes, rales, or rhonchi; respiration nonlabored   ABDOMEN:  Normoactive bowel sounds, soft, no tenderness, no distention  EXTREMITIES: peripheral pulses palpable; no edema  NEURO: no gross focal findings; cranial nerves grossly intact   SKIN:  Dry, intact, warm to touch    Lab Results:   CBC with diff:   Results from last 7 days   Lab Units 04/04/22  0503   WBC Thousand/uL 8 27   RBC Million/uL 3 67*   HEMOGLOBIN g/dL 12 1   HEMATOCRIT % 36 6   MCV fL 100*   MCH pg 33 0   MCHC g/dL 33 1   RDW % 13 6   MPV fL 11 3   PLATELETS Thousands/uL 143*     CMP:   Results from last 7 days   Lab Units 04/04/22  0503 04/03/22  1206 04/03/22  1206   SODIUM mmol/L 142   < > 140   CHLORIDE mmol/L 109*   < > 104   CO2 mmol/L 23   < > 28   BUN mg/dL 35*   < > 40*   CREATININE mg/dL 1 81*   < > 2 07*   CALCIUM mg/dL 9 0   < > 8 9   AST U/L  --   --  18   ALT U/L  --   --  38   ALK PHOS U/L  --   --  87   EGFR ml/min/1 73sq m 31   < > 26    < > = values in this interval not displayed       HS Troponin:   0   Lab Value Date/Time    HSTNI0 24 04/03/2022 1206    HSTNI2 22 04/03/2022 1410    HSTNI4 22 04/03/2022 1721    HSTNI4 17 03/06/2022 0444    HSTNI4 43 02/26/2022 1003     BNP:   Results from last 7 days   Lab Units 04/04/22  0503   POTASSIUM mmol/L 3 3*   CHLORIDE mmol/L 109*   CO2 mmol/L 23   BUN mg/dL 35*   CREATININE mg/dL 1 81*   CALCIUM mg/dL 9 0   EGFR ml/min/1 73sq m 31     Coags:     TSH:   Results from last 7 days   Lab Units 04/03/22  1206   TSH 3RD GENERATON uIU/mL 3 010     Magnesium:   Results from last 7 days   Lab Units 04/04/22  0503   MAGNESIUM mg/dL 2 4     Lipid Profile:   Results from last 7 days   Lab Units 04/03/22  1206   HDL mg/dL 50   LDL CALC mg/dL 65   TRIGLYCERIDES mg/dL 78         Results from last 7 days   Lab Units 04/04/22  0503 04/03/22  1206   POTASSIUM mmol/L 3 3* 3 0*   CO2 mmol/L 23 28   CHLORIDE mmol/L 109* 104   BUN mg/dL 35* 40*   CREATININE mg/dL 1 81* 2 07*     Results from last 7 days   Lab Units 04/04/22  0503 04/03/22  1721 04/03/22  1206   HEMOGLOBIN g/dL 12 1  --  12 5   HEMATOCRIT % 36 6  --  38 3   PLATELETS Thousands/uL 143* 139* 147*         Results from last 7 days   Lab Units 04/03/22  1206   HDL mg/dL 50   LDL CALC mg/dL 65   TRIGLYCERIDES mg/dL 78         Imaging: I have personally reviewed pertinent reports  Telemetry:   NSR with frequent PACs    EKG:   Date: 4/3/22  Interpretation: NSR with PACs      ECHO:    Echo complete    Interpretation Summary    Left Ventricle: Left ventricular cavity size is dilated  The left ventricular ejection fraction is 25%  Systolic function is severely reduced  Wall motion is normal  There is severe global hypokinesis with regional variation  Diastolic function is abnormal   Left atrial filling pressure is elevated  Wall thickness is increased    Right Ventricle: Systolic function is normal     Left Atrium: The atrium is mildly dilated    Right Atrium: The atrium is mildly dilated    Aortic Valve: There is mild to moderate regurgitation    Mitral Valve: There is moderate regurgitation    Tricuspid Valve: There is mild to moderate regurgitation  The estimated right ventricular systolic pressure is 97 80 mmHg    Pericardium: There is a moderately sized left pleural effusion      VTE Prophylaxis: Heparin       Assessment/Plan Assessment:    1  Unlikely New Onset Atrial Fibrillation  --> [id number]: 5  --> NSR with PACs on EKG and Telemetry monitor  2  HFrEF (25%) 2/2 ICM with a prior CABG (Unclear territories)  --> Lexiscan in 2020 showing medium sized, moderate intensity fixed perfusion defect in the inferior wall region  3  HLD//HTN  4  CKD3  5  Mental Status changes likely multifactorial in setting of aging with component of relative hypotension/hypoperfusion in setting of severe cardiomyopathy and fluid shifts while on diuretics    Plan:  - c/w Aspirin 81mg PO Daily  - c/w Plavix 75mg pO Daily  - c/w Lasix 20mg but lower for BID to PO Daily  - c/w Imdur 15mg PO Daily  - c/w Metoprolol Tart  25mg PO BID  - c/w Pravachol 20mg PO QHS  - May be a candidate for [facility] however will continue to discuss with patient and family given bleeding/fall risk  --> At this time there is no EKGs or telemetry that is suggestive of AF, mostly consistent with NSR with PACs  However if the patients MRI returns with multifocal embolic phenomenon then can consider AF as a source because patient certainly has the substrate for this to occur given his heart history  If AC is started, would discontinue DAPT indefinitely to reduce bleeding risk   - Replete electrolytes PRN with Mg/Phos/K goals of 2/3/4 respectively    Case discussed and reviewed with Dr Komal Bennett who agrees with my assessment and plan  Thank you for involving us in the care of your patient  Fernando Pollock MD  Cardiology Fellow PGY-5    ==========================================================================================    Counseling / Coordination of Care  Total floor / unit time spent today 45 minutes minutes  Greater than 50% of total time was spent with the patient and / or family counseling and / or coordination of care    A description of the counseling / coordination of care:         Epic/ Allscripts/Care Everywhere records reviewed:     ** Please Note: Fluency DirectDictation voice to text software may have been used in the creation of this document   **

## 2022-04-04 NOTE — ASSESSMENT & PLAN NOTE
-new onset per notes/EKG  -telemetry monitoring  -[id number] score of 5; okay from neurology standpoint to initiate [facility] but will defer recommendation to Cardiology  - Medical management per primary team and Cardiology

## 2022-04-04 NOTE — PLAN OF CARE
Problem: OCCUPATIONAL THERAPY ADULT  Goal: Performs self-care activities at highest level of function for planned discharge setting  See evaluation for individualized goals  Description: Treatment Interventions: ADL retraining,Functional transfer training,Endurance training,Patient/family training,Equipment evaluation/education,Compensatory technique education,Activityengagement          See flowsheet documentation for full assessment, interventions and recommendations  Note: Limitation: Decreased ADL status,Decreased Safe judgement during ADL,Decreased cognition,Decreased endurance,Decreased self-care trans  Prognosis: Good,Fair  Assessment: Pt is a 80 y o  male who was admitted to [facility] on 4/3/2022 with Encephalopathy acute   Pt's problem list also includes PMH of HTN, previous surgery and CKD, CHF, CAD, cognitive decline, a-fib  At baseline pt was completing adls and mobility independently with assist from facility staff PRN - meals/homemaking provided  Pt lives at [facility]  Currently pt requires mod to max assist for overall ADLS and mod assist x 2 for functional mobility/transfers  Pt currently presents with impairments in the following categories -limited home support, difficulty performing ADLS, limited insight into deficits, flat affect, decreased initiation and engagement , health management  and environment activity tolerance, endurance, standing balance/tolerance, sitting balance/tolerance, memory, insight, safety , judgement , attention , sequencing  and task initiation   These impairments, as well as pt's fatigue, decreased caregiver support, risk for falls and home environment  limit pt's ability to safely engage in all baseline areas of occupation, includinggrooming, bathing, dressing, toileting, functional mobility/transfers, meal prep, cleaning, social participation  and leisure activities  From OT standpoint, recommend inpt rehab upon D/C   OT will continue to follow to address the below stated goals        OT Discharge Recommendation: Post acute rehabilitation services

## 2022-04-04 NOTE — OCCUPATIONAL THERAPY NOTE
Occupational Therapy Evaluation     Patient Name: Rui JOHNSON Date: 4/4/2022  Problem List  Principal Problem:    Acute encephalopathy  Active Problems:    Chronic combined systolic and diastolic CHF    Chronic kidney disease stage 4    Coronary artery disease    Essential hypertension    Hypokalemia    Atrial fibrillation possibly new onset    Severe protein calorie malnutrition     Thrombocytopenia     Leukocytosis    Past Medical History  Past Medical History:   Diagnosis Date    Chronic renal disease, stage III (HCC)     Chronic renal disease, stage III ([address] )     Essential hypertension, benign     Essential hypertension, benign      Past Surgical History  Past Surgical History:   Procedure Laterality Date    CORONARY ARTERY BYPASS GRAFT  2012    x4           04/04/22 0904   OT Last Visit   OT Visit Date 04/04/22   Note Type   Note type Evaluation   Restrictions/Precautions   Weight Bearing Precautions Per Order No   Other Precautions Cognitive; Chair Alarm; Bed Alarm; Fall Risk;Multiple lines   Pain Assessment   Pain Assessment Tool 0-10   Pain Score No Pain   Home Living   Type of Home Assisted living  [facility] )   Home Layout One level   Home Equipment Walker   Prior Function   Level of Dolores Needs assistance with IADLs; Independent with ADLs and functional mobility   Lives With Facility staff   Receives Help From Personal care attendant; Family   ADL Assistance Independent   IADLs Needs assistance   Falls in the last 6 months   (unknown )   Vocational Retired   Lifestyle   Autonomy I basic adls and mobility - meals/homemaking provided by facility    Reciprocal Relationships supportive family and facility staff   Service to Others retired   Intrinsic Gratification sedentary    Subjective   Subjective offers no c/o    ADL   Eating Assistance 5  Supervision/Setup   Grooming Assistance 4  Minimal Assistance   [address] 3  Moderate [address] 2 Maximal Assistance   UB Dressing Assistance 3  Moderate Assistance   LB Dressing Assistance 2  Maximal Assistance   Toileting Assistance  2  Maximal Assistance   Bed Mobility   Supine to Sit 3  Moderate assistance   Additional items Assist x 2   Transfers   Sit to Stand 3  Moderate assistance   Additional items Assist x 2   Stand to Sit 3  Moderate assistance   Additional items Assist x 2   Stand pivot 3  Moderate assistance   Additional items Assist x 2   Functional Mobility   Functional Mobility 3  Moderate assistance   Additional Comments x2   Additional items Rolling walker   Balance   Static Sitting Fair   Dynamic Sitting Fair -   Static Standing Poor +   Dynamic Standing Poor   Ambulatory Poor   Activity Tolerance   Activity Tolerance Patient limited by fatigue;Treatment limited secondary to medical complications (Comment)   Medical Staff Made Aware Coeval with PT 2* medical complexity , comorbidities and limited tolerance to activity    RUE Assessment   RUE Assessment WFL   LUE Assessment   LUE Assessment WFL   Cognition   Overall Cognitive Status Impaired   Arousal/Participation Arousable; Cooperative   Attention Attends with cues to redirect   Orientation Level Oriented to person;Disoriented to time;Disoriented to situation;Oriented to place  (general place - not time)   Memory Decreased short term memory;Decreased recall of recent events;Decreased recall of precautions   Following Commands Follows one step commands with increased time or repetition   Assessment   Limitation Decreased ADL status; Decreased Safe judgement during ADL;Decreased cognition;Decreased endurance;Decreased self-care trans   Prognosis Good;Fair   Assessment Pt is a 80 y o  male who was admitted to [facility] on 4/3/2022 with Encephalopathy acute   Pt's problem list also includes PMH of HTN, previous surgery and CKD, CHF, CAD, cognitive decline, a-fib   At baseline pt was completing adls and mobility independently with assist from facility staff PRN - meals/homemaking provided  Pt lives at [facility]  Currently pt requires mod to max assist for overall ADLS and mod assist x 2 for functional mobility/transfers  Pt currently presents with impairments in the following categories -limited home support, difficulty performing ADLS, limited insight into deficits, flat affect, decreased initiation and engagement , health management  and environment activity tolerance, endurance, standing balance/tolerance, sitting balance/tolerance, memory, insight, safety , judgement , attention , sequencing  and task initiation   These impairments, as well as pt's fatigue, decreased caregiver support, risk for falls and home environment  limit pt's ability to safely engage in all baseline areas of occupation, includinggrooming, bathing, dressing, toileting, functional mobility/transfers, meal prep, cleaning, social participation  and leisure activities  From OT standpoint, recommend inpt rehab upon D/C  OT will continue to follow to address the below stated goals  Goals   Patient Goals get some rest    LTG Time Frame 10-14   Long Term Goal #1 refer to established goals below    Plan   Treatment Interventions ADL retraining;Functional transfer training; Endurance training;Patient/family training;Equipment evaluation/education; Compensatory technique education; Activityengagement   Goal Expiration Date 04/18/22   OT Frequency 3-5x/wk   Recommendation   OT Discharge Recommendation Post acute rehabilitation services   AM-PAC Daily Activity Inpatient   Lower Body Dressing 2   Bathing 2   Toileting 2   Upper Body Dressing 2   Grooming 3   Eating 3   Daily Activity Raw Score 14   Daily Activity Standardized Score (Calc for Raw Score >=11) 33 39   AM-PAC Applied Cognition Inpatient   Following a Speech/Presentation 2   Understanding Ordinary Conversation 3   Taking Medications 2   Remembering Where Things Are Placed or Put Away 2   Remembering List of 4-5 Errands 2   Taking Care of Complicated Tasks 2   Applied Cognition Raw Score 13   Applied Cognition Standardized Score 30 46       OCCUPATIONAL THERAPY GOALS:    *SBA adls after setup with use of AE PRN  *SBA toileting and clothing management   *SBA functional mobility and transfers to/from all surfaces with fair+ dynamic balance and safety for participation in dynamic adls and iadl tasks   *Demonstrate fair+ carryover with safe use of RW during functional tasks   *Assess DME needs   *Increase activity tolerance to 35-40 minutes for participation in adls and enjoyable activities  *Assist with safe d/c recommendations     The patient's raw score on the AM-PAC Daily Activity inpatient short form is 14, standardized score is 33 39, less than 39 4  Patients at this level are likely to benefit from discharge to post-acute rehabilitation services  Please refer to the recommendation of the Occupational Therapist for safe discharge planning        Kalyn Sparks

## 2022-04-04 NOTE — CONSULTS
Consultation - Neurology   Mayra Ball 80 y o  male MRN: [id number]  Unit/Bed#: [facility] 905-01 Encounter: [id number]      Assessment/Plan   * Acute encephalopathy  Assessment & Plan  80year old male with history of stage 4 CKD, HTN, CHF, poor cardiac function/cardiomyopathy with EF 25% other recent admissions (February/March) presenting from assisted living facility on 04/03 after being found minimally responsive by staff  Exam improved while in ED (began speaking more, followed commands), was somewhat amnestic to events earlier in the morning  Son notes few similar spells like this over the past few weeks  Noted with new onset a-fib on cardiac monitoring  Will investigate origin of above mentioned episodes: ifferential includes embolic CVA from new onset a-fib/poor EF, seizure, versus episodes related to poor perfusion/arrhythmia/cognitive impairment/delirium      Plan:  -CT head on admission negative for acute intracranial pathology  -MRI brain pending (will obtain MRA head/neck if able to evaluate vasculature; defer CTA with patient's CKD/renal dysfunction  -will check routine EEG to look for epileptiform abnormalities  -recent echo in late February 2022 with EF 25%, severely reduced systolic function and severe global hypokinesis, bilateral atrial dilation  -continuing dual AP with aspirin/plavix, continue statin  -hemoglobin A1C of 5 6, lipid panel with LDL of 65  -B12 level of 496, TSH within normal limits  -UA reviewed  -neuro checks  -telemetry monitoring  -supportive care  -therapy evaluations (PT/OT/Speech therapy)    Atrial fibrillation possibly new onset  Assessment & Plan  -new onset per notes/EKG  -telemetry monitoring  -[id number] score of 5; would be candidate for [facility] provided no significant bleeding/fall risk; may need to discuss [facility] with patient/family following MRI results    Hypokalemia  Assessment & Plan  -correction via primary team, monitoring BMP    Coronary artery disease  Assessment & Plan  -continuing DAPT/statin/beta blocker    Chronic kidney disease stage 4  Assessment & Plan  Lab Results   Component Value Date    EGFR 31 04/04/2022    EGFR 26 04/03/2022    EGFR 24 03/06/2022    CREATININE 1 81 (H) 04/04/2022    CREATININE 2 07 (H) 04/03/2022    CREATININE 2 18 (H) 03/06/2022     -trending CMP/Cr/BUN    Will further discuss plan of care with attending neurologist      Recommendations for outpatient neurological follow up have yet to be determined  History of Present Illness     Reason for Consult / Principal Problem: Mental status change, poorly responsive state  HPI: Sukhdeep Lemon is a 80 y o  male with history as mentioned above in assessment who neurology is asked to evaluate regarding the above  History obtained primarily via chart/note review:    Patient resides at assisted living facility, was seen in normal state of health yesterday morning (went to breakfast), he went back to his room and was found later by staff altered and poorly responsive  EMS was called  Patient was brought [facility] ED; initially was still poorly responsive and speaking minimally  Later during re-evaluation, patient awoke more and was oriented to place, time, following commands  Workup included normal vitals, labs with hypokalemia and CKD, CT head negative for acute intracranial pathology  UA, B12, TSH reviewed  ED resident note suggests that patient's son has noted other episodes of changes in level of responsiveness over the past few weeks  Discussed with patient's son over the phone this morning: patient has been slightly declining over past few months; additional patient has had two recent admission in late February/early March for CHF exacerbation and chest pain       Son has noted multiple episodes over the past few weeks of altered mental status: patient during these episodes will at times stare ahead/not answer for few seconds, afterwards patient will be very slow to respond/answer questions for an hour or two  Patient this morning doing fine, denies any specific complaints  He is amnestic to the events surrounding yesterday (does not recall ambulance ride/coming into hospital)  Inpatient consult to Neurology  Consult performed by: Justice Kwan PA-C  Consult ordered by: Wai Simon MD          Review of Systems   Constitutional: Positive for fatigue  HENT: Negative  Eyes: Negative for photophobia and visual disturbance  Respiratory: Negative  Cardiovascular: Negative  Gastrointestinal: Negative  Musculoskeletal: Negative for gait problem, neck pain and neck stiffness  Skin: Negative  Neurological: Positive for tremors  Negative for dizziness, seizures, syncope, facial asymmetry, speech difficulty, weakness, light-headedness, numbness and headaches  Limited ROS  Historical Information   Past Medical History:   Diagnosis Date    Chronic renal disease, stage III ([facility] [address] )     Chronic renal disease, stage III ([facility] [address] )     Essential hypertension, benign     Essential hypertension, benign      Past Surgical History:   Procedure Laterality Date    CORONARY ARTERY BYPASS GRAFT  2012    x4     Social History   Social History     Substance and Sexual Activity   Alcohol Use Not Currently     Social History     Substance and Sexual Activity   Drug Use No     E-Cigarette/Vaping    E-Cigarette Use Never User      E-Cigarette/Vaping Substances     Social History     Tobacco Use   Smoking Status Never Smoker   Smokeless Tobacco Never Used     Family History:   Family History   Problem Relation Age of Onset    No Known Problems Mother     No Known Problems Father        Review of previous medical records was completed      Meds/Allergies   current meds:   Current Facility-Administered Medications   Medication Dose Route Frequency    acetaminophen (TYLENOL) tablet 650 mg  650 mg Oral Q6H PRN    aspirin (ECOTRIN LOW STRENGTH) EC tablet 81 mg  81 mg Oral Daily    calcium carbonate (TUMS) chewable tablet 1,000 mg  1,000 mg Oral Daily PRN    clopidogrel (PLAVIX) tablet 75 mg  75 mg Oral Daily    docusate sodium (COLACE) capsule 100 mg  100 mg Oral BID    furosemide (LASIX) tablet 20 mg  20 mg Oral BID    heparin (porcine) subcutaneous injection 5,000 Units  5,000 Units Subcutaneous Q8H [facility]    isosorbide mononitrate (IMDUR) 24 hr tablet 15 mg  15 mg Oral Daily    metoprolol tartrate (LOPRESSOR) tablet 25 mg  25 mg Oral BID    ondansetron (ZOFRAN) injection 4 mg  4 mg Intravenous Q6H PRN    pravastatin (PRAVACHOL) tablet 20 mg  20 mg Oral Daily    senna (SENOKOT) tablet 17 2 mg  2 tablet Oral Daily    and PTA meds:   Prior to Admission Medications   Prescriptions Last Dose Informant Patient Reported? Taking? Multiple Vitamin (multivitamin) capsule Not Taking at Unknown time Child Yes No   Sig: Take 1 capsule by mouth daily   Patient not taking: Reported on 4/3/2022    aspirin (ASPIRIN LOW DOSE) 81 mg EC tablet 4/3/2022 at Unknown time Child Yes Yes   Sig: Take 1 tablet by mouth daily   calcium carbonate (OS-VIRGEN) 1250 (500 Ca) MG tablet Not Taking at Unknown time Child Yes No   Sig: Take 600 mg by mouth   Patient not taking: Reported on 4/3/2022    clopidogrel (Plavix) 75 mg tablet 4/3/2022 at Unknown time Child No Yes   Sig: Take 1 tablet (75 mg total) by mouth daily   furosemide (LASIX) 20 mg tablet 4/3/2022 at Unknown time  No Yes   Sig: Take 1 tablet (20 mg total) by mouth 2 (two) times a day May give an additional dose of 20 mg for 3lb weight gain in 24 hours, 5lbs in one week, worsening SOB, or edema       isosorbide mononitrate (IMDUR) 30 mg 24 hr tablet 4/3/2022 at Unknown time  No Yes   Sig: Take 0 5 tablets (15 mg total) by mouth daily   metoprolol tartrate (LOPRESSOR) 50 mg tablet 4/3/2022 at Unknown time Other (Specify) No Yes   Sig: Take 1 tablet (50 mg total) by mouth 2 (two) times a day   Patient taking differently: Take 25 mg by mouth 2 (two) times a day pravastatin (PRAVACHOL) 20 mg tablet 4/2/2022 at Unknown time Child No Yes   Sig: Take 1 tablet (20 mg total) by mouth daily   tamsulosin (FLOMAX) 0 4 mg Not Taking at Unknown time  No No   Sig: Take 1 capsule (0 4 mg total) by mouth daily with dinner   Patient not taking: Reported on 4/3/2022       Facility-Administered Medications: None       Allergies   Allergen Reactions    Loratadine     Nitrofurantoin     Penicillins     Phenazopyridine        Objective   Vitals:Blood pressure 124/75, pulse 91, temperature 98 9 °F (37 2 °C), resp  rate 18, weight 59 kg (130 lb), SpO2 96 %  ,Body mass index is 21 63 kg/m²  Intake/Output Summary (Last 24 hours) at 4/4/2022 0826  Last data filed at 4/4/2022 0430  Gross per 24 hour   Intake 0 ml   Output 1450 ml   Net -1450 ml       Invasive Devices: Invasive Devices  Report    Peripheral Intravenous Line            Peripheral IV 04/03/22 Distal;Dorsal (posterior); Left Forearm 1 day          Drain            Urethral Catheter 16 Fr  <1 day                Physical Exam  Constitutional:       Appearance: Normal appearance  HENT:      Head: Normocephalic and atraumatic  Eyes:      Extraocular Movements: EOM normal       Pupils: Pupils are equal, round, and reactive to light  Cardiovascular:      Rate and Rhythm: Normal rate and regular rhythm  Pulmonary:      Effort: Respiratory distress (minimal dyspnea with strength/motor testing ) present  Skin:     General: Skin is warm and dry  Neurological:      Mental Status: He is alert  Neurologic Exam     Mental Status   Patient awake, alert, flat affect, oriented to self, month (not year), location, names the president, performs calculations  Reads NIHSS sentences and names objects without aphasia/dysarthria, following central and appendicular commands  Cranial Nerves     CN II   Visual fields full to confrontation  CN III, IV, VI   Pupils are equal, round, and reactive to light    Extraocular motions are normal      CN V   Facial sensation intact  CN VII   Facial expression full, symmetric  CN VIII   CN VIII normal      CN IX, X   CN IX normal    CN X normal      CN XI   CN XI normal      CN XII   CN XII normal      Motor Exam   Muscle bulk: normal  Overall muscle tone: normal  Age appropriate strength with  strength/deltoids bilaterally  Can minimally sustain antigravity with bilateral leg lift, effortful per patient, dorsiflexion/plantarflexion appears age appropriate  Tremulous with testing in UE/LE  Sensory Exam   Light touch normal      Gait, Coordination, and Reflexes   DTR's deferred for patient comfort,     Tremulous with action/intention, finger to nose appears without lateralizing ataxia         Lab Results:   CBC:   Results from last 7 days   Lab Units 04/04/22  0503 04/03/22  1721 04/03/22  1206   WBC Thousand/uL 8 27  --  11 66*   RBC Million/uL 3 67*  --  3 81*   HEMOGLOBIN g/dL 12 1  --  12 5   HEMATOCRIT % 36 6  --  38 3   MCV fL 100*  --  101*   PLATELETS Thousands/uL 143* 139* 147*   , BMP/CMP:   Results from last 7 days   Lab Units 04/04/22  0503 04/03/22  1206   SODIUM mmol/L 142 140   POTASSIUM mmol/L 3 3* 3 0*   CHLORIDE mmol/L 109* 104   CO2 mmol/L 23 28   BUN mg/dL 35* 40*   CREATININE mg/dL 1 81* 2 07*   CALCIUM mg/dL 9 0 8 9   AST U/L  --  18   ALT U/L  --  38   ALK PHOS U/L  --  87   EGFR ml/min/1 73sq m 31 26   , Vitamin B12:   Results from last 7 days   Lab Units 04/03/22  1206   VITAMIN B 12 pg/mL 496   , HgBA1C:   Results from last 7 days   Lab Units 04/03/22  1206   HEMOGLOBIN A1C % 5 6   , TSH:   Results from last 7 days   Lab Units 04/03/22  1206   TSH 3RD GENERATON uIU/mL 3 010   , Coagulation:   , Lipid Profile:   Results from last 7 days   Lab Units 04/03/22  1206   HDL mg/dL 50   LDL CALC mg/dL 65   TRIGLYCERIDES mg/dL 78   , Ammonia:   Results from last 7 days   Lab Units 04/04/22  0503   AMMONIA umol/L 22   , Urinalysis:   Results from last 7 days   Lab Units 04/03/22  1433 03/29/22  1523   COLOR UA  Yellow Light Yellow   CLARITY UA  Clear Clear   SPEC GRAV UA  1 015 1 011   PH UA  5 5 6 0   LEUKOCYTES UA  Negative Negative   NITRITE UA  Negative Negative   GLUCOSE UA mg/dl Negative Negative   KETONES UA mg/dl Negative Negative   BILIRUBIN UA  Negative Negative   BLOOD UA  Moderate* Small*   , Drug Screen:   , Medication Drug Levels:       Invalid input(s): CARBAMAZEPINE,  PHENOBARB, LACOSAMIDE, OXCARBAZEPINE  Imaging Studies: I have personally reviewed pertinent films in PACS   CT head without contrast   Final Result by Lalita Catalan MD (04/03 1247)      No acute intracranial abnormality  Workstation performed: [id number]         MRI inpatient order    (Results Pending)       EKG, Pathology, and Other Studies: I have personally reviewed pertinent reports  VTE Prophylaxis: Sequential compression device (Venodyne)  and Heparin    Code Status: Level 1 - Full Code    Total time spent today 50 minutes

## 2022-04-04 NOTE — ASSESSMENT & PLAN NOTE
Etiology unclear at this time  CT of head negative for acute intracranial etiology  Urinalysis negative for signs of infection - TSH/ammonia levels within normal limits  Await MRI of brain and MR angiogram of head/carotids - await EEG  Await neurology evaluation  Questionable new onset atrial fibrillation noted on admission - stroke workup pending as above  Mentation improved today but still intermittently sluggish in responses

## 2022-04-04 NOTE — CASE MANAGEMENT
Case Management Assessment & Discharge Planning Note    Patient name Ebonie Wakefield  Location [facility] 905/[facility] 227-32 MRN [id number]  : 1926 Date 2022       Current Admission Date: 4/3/2022  Current Admission Diagnosis:Acute encephalopathy   Patient Active Problem List    Diagnosis Date Noted    Severe protein calorie malnutrition  2022    Thrombocytopenia  2022    Leukocytosis 2022    Acute encephalopathy 2022    Hypokalemia 2022    Atrial fibrillation possibly new onset 2022    Cognitive decline 2022    PVC's (premature ventricular contractions) 2022    Chronic kidney disease stage 4 2021    Chronic kidney disease-mineral and bone disorder 2021    Chronic combined systolic and diastolic CHF     CKD (chronic kidney disease) stage 4, GFR 15-29 ml/min ([facility]) 2013    Coronary artery disease 2013    Essential hypertension 2013    Benign hypertensive CKD, stage 1-4 or unspecified chronic kidney disease 2013    Hypercholesterolemia 10/08/2012      LOS (days): 0  Geometric Mean LOS (GMLOS) (days):   Days to GMLOS:     OBJECTIVE:    Risk of Unplanned Readmission Score: 18         Current admission status: Inpatient       Preferred Pharmacy:   [address] [place] [address], [address] [address] [place] [address]  Phone: [phone] Fax: [phone]    Primary Care Provider: Yajaira Rey DO    Primary Insurance: MEDICARE  Secondary Insurance: BLUE CROSS    ASSESSMENT:  [address] [facility], [address] Representative - Son   Primary Phone: [phone] (Mobile)  Home Phone: [phone]                              Patient Information  Admitted from[de-identified[facility] Facility [facility]  Mental Status: Confused  During Assessment patient was accompanied by:  Son  Assessment information provided by[de-identified] Other - please comment,Son (Nurse - Mariola Call at [facility])  Primary Caregiver: Other (Comment) (facility staff and son who is the [address])  [address] [facility] Name[de-identified] Vasquez Munoz (son)  Caregiver's Relationship to Patient[de-identified] Family Member  Caregiver's Telephone Number[de-identified] [phone]  Support Systems: Self,Other (Comment),Son (facility staff memebers at [place] [address])  What city do you live in?: [address] entry access options   Select all that apply : No steps to enter home  Type of Current Residence: Facility ([place] [address])  Upon entering residence, is there a bedroom on the main floor (no further steps)?: Yes  Upon entering residence, is there a bathroom on the main floor (no further steps)?: Yes  In the last 12 months, was there a time when you were not able to pay the mortgage or rent on time?: No  In the last 12 months, how many places have you lived?: 1  In the last 12 months, was there a time when you did not have a steady place to sleep or slept in a shelter (including now)?: No  Living Arrangements: Other (Comment) (facility staff)    Activities of Daily Living Prior to Admission  Functional Status: Assistance  Completes ADLs independently?: No  Level of ADL dependence: Assistance (per staff at facility - patient was able to dress himself and shower himself with verbal cues and reminders)  Ambulates independently?: Yes (was able to walk to and from the dining area with a RW)  Does patient use assisted devices?: Yes  Assisted Devices (DME) used: [employer]  Does patient currently own DME?: Yes  What DME does the patient currently own?: Rip Garcia  Does patient have a history of Outpatient Therapy (PT/OT)?: No  Does the patient have a history of Short-Term Rehab?: No  Does patient have a history of HHC?: Yes ([facility])  Does patient currently have [address]?: No         Patient Information Continued  Income Source: Pension/[facility]  Does patient have prescription coverage?: Yes  Within the past 12 months, you worried that your food would run out before you got the money to buy more : Never true  Within the past 12 months, the food you bought just didnt last and you didnt have money to get more : Never true  Does patient receive dialysis treatments?: No  Does patient have a history of substance abuse?: No  Does patient have a history of Mental Health Diagnosis?: No         Means of Transportation  Means of Transport to Appts[de-identified] Family transport  In the past 12 months, has lack of transportation kept you from medical appointments or from getting medications?: No  In the past 12 months, has lack of transportation kept you from meetings, work, or from getting things needed for daily living?: No        DISCHARGE DETAILS:    Discharge planning discussed with[de-identified] son Lorenzo Grandmarcus        CM contacted family/caregiver?: Yes  Were Treatment Team discharge recommendations reviewed with patient/caregiver?: Yes  Did patient/caregiver verbalize understanding of patient care needs?: Yes  Were patient/caregiver advised of the risks associated with not following Treatment Team discharge recommendations?: Yes    Contacts  Patient Contacts: son Marcelino Ojeda  Relationship to Patient[de-identified] Family  Contact Method: In Person  Reason/Outcome: Continuity of Care,Emergency Contact,Discharge Planning,Referral              Other Referral/Resources/Interventions Provided:  Referral Comments: patient is currently recommended for STR at time of discharge / spoke with Mariola Call (nurse at [facility]) who states the patient would need to be able to be closer to his baseline in order for the patient to return - he would need to be an assist of 1 person and able to ambulate with the use of a RW, or able to self propel a w/c  Per Mariola Call they were discussing hospice services ([facility]) at the facility however at that time the family was not in agreement (this was to allow for an extra set of hands and assistance and staff for 2-3x/week)   Per Mariola Call their facility is run by med techs and there is 1 nurse for 2 buildings to handle medical issues  Son at this time is stating that he is in agreement with [facility] hospice (spoke with MD who stated that at this time the patient is not end of life care at this time) - informed Mariano Adams who stated that she was going to let the manager at [facility] know this way they can begin the conversation again upon his return to the facility  Per Olga once the patient is able to do more, they are able to accept him back to their facility  They would also be able to offer some rehab under the hospice umbrella per Olga  (NURSE OLGA STATED SHE WOULD NEED TO DO AN IN HOSPITAL ASSESSMENT PRIOR TO THE PATIENT RETURNING TO THE FACILITY)               Additional Comments: Son states that the patients wife Matilda Wisdom) is currently at [facility] and he would also like a referral sent to them for STR prior to returning to [place] [address] - call placed to [facility] to inform them and referral placed in [employer]  Spoke with son about filling out the financial application for [facility] however he states that the patients finances are the same as what was on the financial application that was sent over just 2 months ago for the patients spouse  VM left for [facility] to discuss to see if they are able to pull that financial application - currently awaiting return call at this time  CM reviewed d/c planning process including the following: identifying help at home, patient preference for d/c planning needs, Discharge Lounge, [facility] Meds to Bed program, availability of treatment team to discuss questions or concerns patient and/or family may have regarding understanding medications and recognizing signs and symptoms once discharged  CM also encouraged patient to follow up with all recommended appointments after discharge   Patient advised of importance for patient and family to participate in managing patients medical well being  Patient/caregiver received discharge checklist  Content reviewed  Patient/caregiver encouraged to participate in discharge plan of care prior to discharge home

## 2022-04-04 NOTE — ASSESSMENT & PLAN NOTE
Baseline creatinine approximately 1 8-2 0 - currently @ 1 81  Monitor renal function and urine output - limit/avoid nephrotoxins if possible - avoid hypotension as possible  Note chronic Lasix use

## 2022-04-04 NOTE — ASSESSMENT & PLAN NOTE
Last EF of 25% in February 2022 w/ severe global LV hypokinesis, mild biatrial dilatation, mild-moderate AR/TR, and moderate MR  On diuresis w/ Lasix - on beta-blockade w/ Lopressor - on afterload reduction w/ Imdur

## 2022-04-04 NOTE — PHYSICAL THERAPY NOTE
Physical Therapy Evaluation     Patient's Name: Ajit [place]    Admitting Diagnosis  Hypokalemia [E87 6]  Somnolence [R40 0]  Altered mental status [R41 82]  Encephalopathy acute [G93 40]    Problem List  Patient Active Problem List   Diagnosis    CKD (chronic kidney disease) stage 4, GFR 15-29 ml/min (HCC)    Benign hypertensive CKD, stage 1-4 or unspecified chronic kidney disease    Chronic kidney disease-mineral and bone disorder    Chronic combined systolic and diastolic heart failure (HCC)    Chronic renal disease, stage IV ([facility] [address] )    Coronary artery disease    Hypertension    Hypercholesterolemia    PVC's (premature ventricular contractions)    Cognitive decline    Encephalopathy acute    Hypokalemia    A-fib ([facility] [address] )       Past Medical History  Past Medical History:   Diagnosis Date    Chronic renal disease, stage III ([facility] [address] )     Chronic renal disease, stage III ([facility] [address] )     Essential hypertension, benign     Essential hypertension, benign        Past Surgical History  Past Surgical History:   Procedure Laterality Date    CORONARY ARTERY BYPASS GRAFT  2012    x4            22 0905   PT Last Visit   PT Visit Date 22   Note Type   Note type Evaluation   Pain Assessment   Pain Assessment Tool 0-10   Pain Score No Pain   Restrictions/Precautions   Weight Bearing Precautions Per Order No   Other Precautions Chair Alarm; Bed Alarm;Cognitive;Multiple lines; Fall Risk   Home Living   Type of Home Assisted living  ([facility] )   Home Layout One level   [address]   Additional Comments questionable historian, info obtained from chart review    Prior Function   Level of [place] Needs assistance with IADLs; Needs assistance with ADLs and functional mobility   Lives With Facility staff   Receives Help From Other (Comment)  (facility staff )   ADL Assistance Independent   IADLs Needs assistance   Falls in the last 6 months   (unable to report )   Vocational Retired   Comments pt questionable historian, need to confirm home set up and assistance with CM  Per previous notes, pt ambulating to and from dining [place] with RW   General   Family/Caregiver Present No   Cognition   Arousal/Participation Lethargic   Orientation Level Oriented to person;Disoriented to time;Disoriented to situation;Oriented to place  (oriented to general place (hospital), did not know city )   Memory Decreased recall of precautions;Decreased short term memory;Decreased recall of recent events   Following Commands Follows one step commands with increased time or repetition   Comments pt pleasant and agreeable to therapy   RLE Assessment   RLE Assessment   (grossly 2/5 strength)   LLE Assessment   LLE Assessment   (grossly 2/5 strength )   Light Touch   RLE Light Touch Grossly intact   LLE Light Touch Grossly intact   Bed Mobility   Supine to Sit 3  Moderate assistance   Additional items Assist x 2;LE management;Verbal cues; Increased time required   Additional Comments pt found supine in bed, returned to bedside chair alarm on all needs within reach   Transfers   Sit to Stand 3  Moderate assistance   Additional items Assist x 2; Increased time required;Verbal cues   Stand to Sit 3  Moderate assistance   Additional items Assist x 2; Increased time required;Verbal cues   Stand pivot 3  Moderate assistance   Additional items Assist x 2; Increased time required;Verbal cues   Additional Comments transfers with RW    Ambulation/Elevation   Gait pattern Narrow LALITA; Decreased foot clearance;Shuffling; Short stride; Step to;Excessively slow;Knees flexed   Gait Assistance 3  Moderate assist   Additional items Assist x 2   Assistive Device Rolling walker   Distance 2 side steps to assist in stand pivot transfer    Balance   Static Sitting Fair -   Dynamic Sitting Poor +   Static Standing Poor   Dynamic Standing Poor   Ambulatory Poor -   Endurance Deficit   Endurance Deficit Yes   Endurance Deficit Description decreased strength and endurance, balance deficits    Activity Tolerance   Activity Tolerance Patient limited by fatigue   Medical Staff Made Aware co-eval with OT 2* to medical complexity    Nurse Made Aware RN cleared pt to participate in therapy; RN updated following therapy    Assessment   Prognosis Fair   Problem List Decreased strength;Decreased range of motion;Decreased endurance; Impaired balance;Decreased mobility; Decreased cognition; Impaired judgement;Decreased safety awareness   Assessment Pt seen for high complexity PT evaluation to assess potential discharge needs  Pt has active PT eval/treatment orders as well as up with assistance orders  Pt is a 81 y/o male admitted on 4/3/2022 with altered mental status and decreased responsiveness  Comorbidities affecting pt upon evaluation are as follows: acute encephalopathy, systolic and diastolic heart failure, stage IV renal disease, CAD, HTN, and Afib  Pt  has a past medical history of Chronic renal disease, stage III ([address] ), Chronic renal disease, stage III ([address] ), Essential hypertension, benign, and Essential hypertension, benign  Personal factors affecting patient at time of initial evaluation include inability to perform ADLs, advanced age, and limited home support  Prior to admission, patient was residing in an assisted living facility and was performing ADLs independently and IADLS with assistance  Upon evaluation, they have current limitations with strength, endurance, balance, range of motion, safety awareness, and overall functional mobility, putting patient at increased risk for falls  Pt currently performing bed mobility tasks with mod assist x2, functional transfers with mod assist x2, and ambulation with mod assist x2 and RW  Pt demonstrates B/L UE and LE shaking and postural instability in standing - RN updated following session  Pt would benefit from continued PT services while in hospital to address the remaining deficits   PT to continue to follow pt and recommends rehab upon discharge  The patient's AM-PAC Basic Mobility Inpatient Short Form Raw Score is 6  A Raw score of less than or equal to 16 suggests the patient may benefit from discharge to post-acute rehabilitation services  Please refer to the recommendation of the Physical Therapist for safe discharge planning  Barriers to Discharge Decreased caregiver support   Goals   Patient Goals to go back to sleep   STG Expiration Date 04/18/22   Short Term Goal #1 STG 1  Pt will be able to perform bed mobility with modified independence to improve independence and functional mobility  STG 2  Pt will be able to sit EOB for 25 min with modified independence to strengthen abdominal musculature and assist in transfers  STG 3  Pt will be able to perform transfers supervision with least restrictive AD to improve safety and functional mobility  STG 4  Pt will demonstrate improved endurance by ambulating 250 feet with least restrictive AD to improve functional mobility and assist in safe d/c STG 5  Pt will improve LE strength as demonstrated by negotiating 1 stairs with supervision and least restrictive AD to improve overall function and improve safety for d/c     PT Treatment Day 0   Plan   Treatment/Interventions Functional transfer training;Cognitive reorientation;Patient/family training; Endurance training;Equipment eval/education; Bed mobility;Spoke to nursing;OT   PT Frequency 3-5x/wk   Recommendation   PT Discharge Recommendation Post acute rehabilitation services   Equipment Recommended [facility] Recommended Wheeled walker   AM-PAC Basic Mobility Inpatient   Turning in Bed Without Bedrails 1   Lying on Back to Sitting on Edge of Flat Bed 1   Moving Bed to Chair 1   Standing Up From Chair 1   Walk in Room 1   Climb 3-5 Stairs 1   Basic Mobility Inpatient Raw Score 6   Turning Head Towards Sound 3   Follow Simple Instructions 3   Low Function Basic Mobility Raw Score 12   Low Function Basic Mobility Standardized Score 18 33   Highest Level Of Mobility   -[facility] Goal 2: Bed activities/Dependent transfer   -[facility] Highest Level of Mobility 3: Sit at edge of bed   -[facility] Goal Achieved Yes   Modified [place] Scale   Modified [place] Scale 4   End of Consult   Patient Position at End of Consult Bedside chair;Bed/Chair alarm activated; All needs within reach       [facility], [facility] 11-Apr-2021 10:46

## 2022-04-04 NOTE — PROGRESS NOTES
[facility]  Progress Note - Beau Helms 12/6/1926, 80 y o  male MRN: [id number]  Unit/Bed#: [facility] 905-01 Encounter: [id number]  Primary Care Provider: Vidal Workman DO   Date and time admitted to hospital: 4/3/2022 11:13 AM      * Acute encephalopathy  Assessment & Plan  Etiology unclear at this time  CT of head negative for acute intracranial etiology  Urinalysis negative for signs of infection - TSH/ammonia levels within normal limits  Await MRI of brain and MR angiogram of head/carotids - await EEG  Await neurology evaluation  Questionable new onset atrial fibrillation noted on admission - stroke workup pending as above  Mentation improved today but still intermittently sluggish in responses    Suspected atrial fibrillation possibly new onset  Assessment & Plan  As noted on admission  Rate controlled on Lopressor - not chronically on anticoagulation, however, on dual anti-platelet therapy with ASA/Plavix  In the setting of altered mentation on presentation, stroke workup ongoing  Echocardiogram earlier this year revealed mild biatrial dilatation  Will appreciate cardiology input    Chronic combined systolic and diastolic CHF  Assessment & Plan  Last EF of 25% in February 2022 w/ severe global LV hypokinesis, mild biatrial dilatation, mild-moderate AR/TR, and moderate MR  On diuresis w/ Lasix - on beta-blockade w/ Lopressor - on afterload reduction w/ Imdur    Chronic kidney disease stage 4  Assessment & Plan  Baseline creatinine approximately 1 8-2 0 - currently @ 1 81  Monitor renal function and urine output - limit/avoid nephrotoxins if possible - avoid hypotension as possible  Note chronic Lasix use    Coronary artery disease  Assessment & Plan  On dual anti-platelet therapy with ASA/Plavix - continue statin/Lopressor/Imdur  S/p CABG    Essential hypertension  Assessment & Plan  Continue Lopressor    Severe protein calorie malnutrition   Assessment & Plan  BMI of 21 63 in the setting of aging coupled with decreased appetite/oral intake and evidence of severe muscle wasting/atrophy and subcutaneous fat loss on exam   Initiated Ensure nutritional supplementation w/ meals    Hypokalemia  Assessment & Plan  Likely multifactorial secondary to oral intake and chronic diuretic use  Monitor/replete potassium and magnesium as necessary    Thrombocytopenia   Assessment & Plan  Likely reactive acute medical issue(s)  Monitor platelet count    Leukocytosis  Assessment & Plan  Likely reactive acute medical issue(s)  Monitor WBC count - currently normalized      DVT Prophylaxis:  Heparin SC      Patient Centered Rounds:  I have performed bedside rounds and discussed plan of care with nursing today  Discussions with Specialists or Other Care Team Provider:  see above assessments if applicable    Education and Discussions with Family / Patient:  Patient at bedside, along with son, Gemma Newman, over the phone today  Time Spent for Care:  32 minutes  More than 50% of total time spent on counseling and coordination of care as described above  Current Length of Stay: 0 day(s)    Current Patient Status: Observation   Certification Statement:  Patient will continue to require additional hospital stay due to assessments as noted above  Code Status: Level 1 - Full Code        Subjective:     Seen/examined earlier today  Sitting upright in a chair at the time my encounter  He is more awake and talkative today, however, is sluggish in responses  Denies any headaches, dizziness, or blurry vision at this time  Objective:     Vitals:   Temp (24hrs), Av 3 °F (36 8 °C), Min:97 7 °F (36 5 °C), Max:98 9 °F (37 2 °C)    Temp:  [97 7 °F (36 5 °C)-98 9 °F (37 2 °C)] 98 9 °F (37 2 °C)  HR:  [62-95] 73  Resp:  [16-18] 18  BP: (106-124)/(62-75) 119/75  SpO2:  [93 %-97 %] 96 %  Body mass index is 21 63 kg/m²  Input and Output Summary (last 24 hours):        Intake/Output Summary (Last 24 hours) at 4/4/2022 1439  Last data filed at 4/4/2022 1300  Gross per 24 hour   Intake 120 ml   Output 1025 ml   Net -905 ml       Physical Exam:     GENERAL:  Weak/fatigued - frail/cachectic  HEAD:  Normocephalic - atraumatic - temporal wasting present  EYES: PERRL - EOMI   MOUTH:  Mucosa moist  NECK:  Supple - full range of motion - clavicular wasting present  CARDIAC:  Regular rate/rhythm - S1/S2 positive  PULMONARY:  Clear breath sounds bilaterally - nonlabored respirations  ABDOMEN:  Soft - nontender/nondistended - active bowel sounds  MUSCULOSKELETAL:  Motor strength/range of motion deconditioned  NEUROLOGIC:  Awake and oriented to name/place/time but sluggish in responses  SKIN:  Chronic wrinkles/blemishes   PSYCHIATRIC:  Mood/affect flat      Additional Data:     Labs & Recent Cultures:    Results from last 7 days   Lab Units 04/04/22  0503 04/03/22  1721 04/03/22  1206   WBC Thousand/uL 8 27  --  11 66*   HEMOGLOBIN g/dL 12 1  --  12 5   HEMATOCRIT % 36 6  --  38 3   PLATELETS Thousands/uL 143*   < > 147*   NEUTROS PCT %  --   --  80*   LYMPHS PCT %  --   --  9*   MONOS PCT %  --   --  7   EOS PCT %  --   --  2    < > = values in this interval not displayed  Results from last 7 days   Lab Units 04/04/22  0503 04/03/22  1206 04/03/22  1206   POTASSIUM mmol/L 3 3*   < > 3 0*   CHLORIDE mmol/L 109*   < > 104   CO2 mmol/L 23   < > 28   BUN mg/dL 35*   < > 40*   CREATININE mg/dL 1 81*   < > 2 07*   CALCIUM mg/dL 9 0   < > 8 9   ALK PHOS U/L  --   --  87   ALT U/L  --   --  38   AST U/L  --   --  18    < > = values in this interval not displayed           Results from last 7 days   Lab Units 04/03/22  1206   POC GLUCOSE mg/dl 104     Results from last 7 days   Lab Units 04/03/22  1206   HEMOGLOBIN A1C % 5 6     Results from last 7 days   Lab Units 04/03/22  1206   LACTIC ACID mmol/L 1 3         Results from last 7 days   Lab Units 03/29/22  1523   URINE CULTURE  <10,000 cfu/ml          Last 24 Hours Medication List:   Current Facility-Administered Medications   Medication Dose Route Frequency Provider Last Rate    acetaminophen  650 mg Oral Q6H PRN Raf Moura, MD      aspirin  81 mg Oral Daily Raf Moura, MD      calcium carbonate  1,000 mg Oral Daily PRN Raf Moura, MD      clopidogrel  75 mg Oral Daily Charles Mix Remednataliya, MD      docusate sodium  100 mg Oral BID Charles Mix Remednataliya, MD      furosemide  20 mg Oral BID Raf Moura, MD      heparin (porcine)  5,000 Units Subcutaneous [facility] Raf Moura, MD      isosorbide mononitrate  15 mg Oral Daily Charles Mix Remedies, MD      metoprolol tartrate  25 mg Oral BID Charles Mix Remednataliya, MD      ondansetron  4 mg Intravenous Q6H PRN Raf Moura, MD      pravastatin  20 mg Oral Daily Charles Mix Remedies, MD      senna  2 tablet Oral Daily Raf Remednataliya, MD                    ** Please Note: This note is constructed using a voice recognition dictation system  An occasional wrong word/phrase or sound-a-like substitution may have been picked up by dictation device due to the inherent limitations of voice recognition software  Read the chart carefully and recognize, using reasonable context, where substitutions may have occurred  **

## 2022-04-04 NOTE — ASSESSMENT & PLAN NOTE
BMI of 21 63 in the setting of aging coupled with decreased appetite/oral intake and evidence of muscle wasting/atrophy on exam   Initiated Ensure nutritional supplementation w/ meals

## 2022-04-05 ENCOUNTER — PATIENT OUTREACH (OUTPATIENT)
Dept: CASE MANAGEMENT | Facility: OTHER | Age: 87
End: 2022-04-05

## 2022-04-05 LAB
ANION GAP SERPL CALCULATED.3IONS-SCNC: 9 MMOL/L (ref 4–13)
ATRIAL RATE: 120 BPM
BASOPHILS # BLD AUTO: 0.06 THOUSANDS/ΜL (ref 0–0.1)
BASOPHILS NFR BLD AUTO: 1 % (ref 0–1)
BUN SERPL-MCNC: 48 MG/DL (ref 5–25)
CALCIUM SERPL-MCNC: 9 MG/DL (ref 8.3–10.1)
CHLORIDE SERPL-SCNC: 105 MMOL/L (ref 100–108)
CO2 SERPL-SCNC: 24 MMOL/L (ref 21–32)
CREAT SERPL-MCNC: 1.99 MG/DL (ref 0.6–1.3)
EOSINOPHIL # BLD AUTO: 0.23 THOUSAND/ΜL (ref 0–0.61)
EOSINOPHIL NFR BLD AUTO: 2 % (ref 0–6)
ERYTHROCYTE [DISTWIDTH] IN BLOOD BY AUTOMATED COUNT: 13.5 % (ref 11.6–15.1)
GFR SERPL CREATININE-BSD FRML MDRD: 27 ML/MIN/1.73SQ M
GLUCOSE SERPL-MCNC: 96 MG/DL (ref 65–140)
HCT VFR BLD AUTO: 36.6 % (ref 36.5–49.3)
HGB BLD-MCNC: 12.1 G/DL (ref 12–17)
IMM GRANULOCYTES # BLD AUTO: 0.03 THOUSAND/UL (ref 0–0.2)
IMM GRANULOCYTES NFR BLD AUTO: 0 % (ref 0–2)
LYMPHOCYTES # BLD AUTO: 0.87 THOUSANDS/ΜL (ref 0.6–4.47)
LYMPHOCYTES NFR BLD AUTO: 9 % (ref 14–44)
MAGNESIUM SERPL-MCNC: 2.8 MG/DL (ref 1.6–2.6)
MCH RBC QN AUTO: 32.4 PG (ref 26.8–34.3)
MCHC RBC AUTO-ENTMCNC: 33.1 G/DL (ref 31.4–37.4)
MCV RBC AUTO: 98 FL (ref 82–98)
MONOCYTES # BLD AUTO: 0.66 THOUSAND/ΜL (ref 0.17–1.22)
MONOCYTES NFR BLD AUTO: 7 % (ref 4–12)
NEUTROPHILS # BLD AUTO: 7.91 THOUSANDS/ΜL (ref 1.85–7.62)
NEUTS SEG NFR BLD AUTO: 81 % (ref 43–75)
NRBC BLD AUTO-RTO: 0 /100 WBCS
PHOSPHATE SERPL-MCNC: 2.5 MG/DL (ref 2.3–4.1)
PLATELET # BLD AUTO: 155 THOUSANDS/UL (ref 149–390)
PMV BLD AUTO: 11.5 FL (ref 8.9–12.7)
POTASSIUM SERPL-SCNC: 3.4 MMOL/L (ref 3.5–5.3)
QRS AXIS: -79 DEGREES
QRSD INTERVAL: 166 MS
QT INTERVAL: 462 MS
QTC INTERVAL: 505 MS
RBC # BLD AUTO: 3.73 MILLION/UL (ref 3.88–5.62)
RPR SER QL: NORMAL
SODIUM SERPL-SCNC: 138 MMOL/L (ref 136–145)
T WAVE AXIS: 113 DEGREES
VENTRICULAR RATE: 72 BPM
WBC # BLD AUTO: 9.76 THOUSAND/UL (ref 4.31–10.16)

## 2022-04-05 PROCEDURE — 84100 ASSAY OF PHOSPHORUS: CPT | Performed by: INTERNAL MEDICINE

## 2022-04-05 PROCEDURE — 80048 BASIC METABOLIC PNL TOTAL CA: CPT | Performed by: INTERNAL MEDICINE

## 2022-04-05 PROCEDURE — 99232 SBSQ HOSP IP/OBS MODERATE 35: CPT | Performed by: NURSE PRACTITIONER

## 2022-04-05 PROCEDURE — 99232 SBSQ HOSP IP/OBS MODERATE 35: CPT | Performed by: INTERNAL MEDICINE

## 2022-04-05 PROCEDURE — 83735 ASSAY OF MAGNESIUM: CPT | Performed by: INTERNAL MEDICINE

## 2022-04-05 PROCEDURE — 99233 SBSQ HOSP IP/OBS HIGH 50: CPT | Performed by: INTERNAL MEDICINE

## 2022-04-05 PROCEDURE — 85025 COMPLETE CBC W/AUTO DIFF WBC: CPT | Performed by: INTERNAL MEDICINE

## 2022-04-05 PROCEDURE — 93010 ELECTROCARDIOGRAM REPORT: CPT | Performed by: INTERNAL MEDICINE

## 2022-04-05 RX ORDER — POTASSIUM CHLORIDE 14.9 MG/ML
20 INJECTION INTRAVENOUS
Status: COMPLETED | OUTPATIENT
Start: 2022-04-05 | End: 2022-04-05

## 2022-04-05 RX ORDER — BISACODYL 10 MG
10 SUPPOSITORY, RECTAL RECTAL DAILY
Status: DISCONTINUED | OUTPATIENT
Start: 2022-04-05 | End: 2022-04-07 | Stop reason: HOSPADM

## 2022-04-05 RX ADMIN — DOCUSATE SODIUM 100 MG: 100 CAPSULE, LIQUID FILLED ORAL at 08:53

## 2022-04-05 RX ADMIN — HEPARIN SODIUM 5000 UNITS: 5000 INJECTION INTRAVENOUS; SUBCUTANEOUS at 21:29

## 2022-04-05 RX ADMIN — ASPIRIN 81 MG: 81 TABLET, COATED ORAL at 08:53

## 2022-04-05 RX ADMIN — PRAVASTATIN SODIUM 20 MG: 20 TABLET ORAL at 08:54

## 2022-04-05 RX ADMIN — HEPARIN SODIUM 5000 UNITS: 5000 INJECTION INTRAVENOUS; SUBCUTANEOUS at 15:20

## 2022-04-05 RX ADMIN — POTASSIUM CHLORIDE 20 MEQ: 14.9 INJECTION, SOLUTION INTRAVENOUS at 12:32

## 2022-04-05 RX ADMIN — CLOPIDOGREL BISULFATE 75 MG: 75 TABLET ORAL at 08:53

## 2022-04-05 RX ADMIN — METOPROLOL TARTRATE 25 MG: 25 TABLET, FILM COATED ORAL at 17:39

## 2022-04-05 RX ADMIN — ISOSORBIDE MONONITRATE 15 MG: 30 TABLET, EXTENDED RELEASE ORAL at 08:55

## 2022-04-05 RX ADMIN — SENNOSIDES 17.2 MG: 8.6 TABLET, FILM COATED ORAL at 08:54

## 2022-04-05 RX ADMIN — DOCUSATE SODIUM 100 MG: 100 CAPSULE, LIQUID FILLED ORAL at 17:38

## 2022-04-05 RX ADMIN — HEPARIN SODIUM 5000 UNITS: 5000 INJECTION INTRAVENOUS; SUBCUTANEOUS at 05:47

## 2022-04-05 RX ADMIN — METOPROLOL TARTRATE 25 MG: 25 TABLET, FILM COATED ORAL at 08:54

## 2022-04-05 RX ADMIN — FUROSEMIDE 20 MG: 20 TABLET ORAL at 08:53

## 2022-04-05 NOTE — PROGRESS NOTES
Chart review reveals that patient was admitted on 4/3/22 secondary to altered mental status  Initial vital signs were WNL  Lab work revealed hypokalemia, BUN-35, creat-1 81, low platelets-139, and microscopic urine showed RBC, WBC and bacteria  CT of the head revealed "no acute intracranial abnormality  " Neurology was consulted on 4/4/22 and will be checking EEG, MRI  Cardiology was also consulted on 4/4/22  All medications were continued except furosemide was decreased to 20 mg po daily  Inpatient Case Management was also consulted  PT/OT currently recommending STR  [facility] is unable to accept the patient back at present due to him needing too much assistance  Facility was discussing hospice through [facility]  Patient's son is in agreement  Physician at [facility] states that he feels patient is not end of life at this time  Son would like referral placed to [facility] where patient's wife is at this time  Chart review will continue to assess progress toward D/C and KATHRINE

## 2022-04-05 NOTE — PROGRESS NOTES
Progress Note - Neurology   Dot Julio 80 y o  male [id number]  Unit/Bed#: [facility] 905/[facility] 905-01    Assessment:    * Acute encephalopathy  Assessment & Plan  80year old male with history of stage 4 CKD, HTN, CHF, poor cardiac function/cardiomyopathy with EF 25% other recent admissions (February/March) presenting from assisted living facility on 04/03 after being found minimally responsive by staff  Exam improved while in ED (began speaking more, followed commands), was somewhat amnestic to events earlier in the morning  Son notes few similar spells like this over the past few weeks  Noted with new onset a-fib on cardiac monitoring  Unclear etiology at this time  Suspect TME in the setting of underlying cognitive impairment vs depression in the setting of his wife recently moving out to a facility vs delirium  MRI brain without acute infarct noted  Per discussion with attending neurologist, MRA head findings most likely secondary to motion artifact  Routine EEG without epileptiform discharges or electrographic seizures noted  Urine microscopic with moderate bacteria noted  Plan:  - DAPT with aspirin 81 mg and Plavix 75 mg  - Pravastatin 20 mg  - Okay to start anticoagulation immediately in the setting of Afib from neurology standpoint; defer to Cardiology on initiation of [facility]  - Telemetry  - Monitor neuro exam; notify with any changes  - PT/OT/ST  - Delirium precautions  - Promote appropriate sleep/wake cycle  - Medical management and supportive care per primary team  Correction of metabolic or infectious disturbances   - No further inpatient neurology recommendations at this time  Please call with any questions  Results:  - CT head: No acute intracranial abnormality   - MRI brain:  1  No acute infarction, intracranial hemorrhage or mass effect  2   Mild, chronic microangiopathy   - MRA head:  1   Poorly visualized midportion of the M1 segment of the right middle cerebral artery, nonvisualized basilar artery with poor flow related enhancement of both vertebral arteries as well as both posterior cerebral arteries     Findings may be related to markedly slow flow and/or vascular occlusion/high-grade stenosis  Correlation for signs and symptoms of vertebrobasilar insufficiency  Differential diagnosis could include technical artifact  - MRA carotids:  1  Motion degraded MRA of the neck without hemodynamically significant stenosis in the major arteries of the neck  - Routine EEG: Normal   - A1C 5 6, B12 496, ammonia 22, RPR negative, TSH 3 010  - Lipid panel: cholesterol 131, triglycerides 78, HDL 50, LDL 65  - UA: Moderate blood; urine microscopic: 10-20 RBC, 4-10 WBC, moderate bacteria    Atrial fibrillation possibly new onset  Assessment & Plan  -new onset per notes/EKG  -telemetry monitoring  -[id number] score of 5; okay from neurology standpoint to initiate [facility] but will defer recommendation to Cardiology  - Medical management per primary team and Cardiology    Hypokalemia  Assessment & Plan  -correction via primary team, monitoring BMP    Coronary artery disease  Assessment & Plan  - continuing DAPT/statin/beta blocker  - Medical management per primary team and Cardiology    Chronic kidney disease stage 4  Assessment & Plan  Lab Results   Component Value Date    EGFR 31 04/04/2022    EGFR 26 04/03/2022    EGFR 24 03/06/2022    CREATININE 1 81 (H) 04/04/2022    CREATININE 2 07 (H) 04/03/2022    CREATININE 2 18 (H) 03/06/2022     - Medical management per primary team       Laura Murphy will not need outpatient follow up with Neurology  Case and treatment plan reviewed with attending neurologist, Dr Up Forward  Subjective:   Patient resting in bed  When asked if he was having pain, he responded with "I don't know"  He states he thinks he slept well overnight  He was asking for more ice water  Per RN, patient appears to be doing better than yesterday   RN reports patient currently has a new baez and output will range from pink tinged to clear  RN notes that son reported patient has been declining within the last 3 weeks ever since his wife had to be transferred to a facility to be cared for and son reported patient has declining even more so within the last 2 weeks  Past Medical History:   Diagnosis Date    Chronic renal disease, stage III ([facility] [address] )     Chronic renal disease, stage III ([facility] [address] )     Essential hypertension, benign     Essential hypertension, benign      Past Surgical History:   Procedure Laterality Date    CORONARY ARTERY BYPASS GRAFT  2012    x4     Family History   Problem Relation Age of Onset    No Known Problems Mother     No Known Problems Father      Social History     Socioeconomic History    Marital status: /Civil Union     Spouse name: None    Number of children: None    Years of education: None    Highest education level: None   Occupational History    Occupation: RETIRED   Tobacco Use    Smoking status: Never Smoker    Smokeless tobacco: Never Used   Vaping Use    Vaping Use: Never used   Substance and Sexual Activity    Alcohol use: Not Currently    Drug use: No    Sexual activity: None   Other Topics Concern    None   Social History Narrative    None     Social Determinants of Health     Financial Resource Strain: Not on file   Food Insecurity: No Food Insecurity    Worried About Running Out of Food in the Last Year: Never true    Nasreen of Food in the Last Year: Never true   Transportation Needs: No Transportation Needs    Lack of Transportation (Medical): No    Lack of Transportation (Non-Medical): No   Physical Activity: Not on file   Stress: Not on file   Social Connections: Not on file   Intimate Partner Violence: Not on file   Housing Stability: Low Risk     Unable to Pay for Housing in the Last Year: No    Number of Places Lived in the Last Year: 1    Unstable Housing in the Last Year: No         Medications:   All current active meds have been reviewed and current meds:  Scheduled Meds:  Current Facility-Administered Medications   Medication Dose Route Frequency Provider Last Rate    acetaminophen  650 mg Oral Q6H PRN Yobani Goodwin MD      aspirin  81 mg Oral Daily Yobani Goodwin MD      calcium carbonate  1,000 mg Oral Daily PRN Yobani Goodwin MD      clopidogrel  75 mg Oral Daily Yobani Goodwin MD      docusate sodium  100 mg Oral BID Yobani Goodwin MD      furosemide  20 mg Oral Daily Navarro Vargas MD      heparin (porcine)  5,000 Units Subcutaneous [facility] Yobani Goodwin MD      isosorbide mononitrate  15 mg Oral Daily Yobani Goodwin MD      metoprolol tartrate  25 mg Oral BID Yobani Goodwin MD      ondansetron  4 mg Intravenous Q6H PRN Yobani Goodwin MD      potassium chloride  20 mEq Intravenous Q2H Karen Beckman MD 20 mEq (04/05/22 1232)    pravastatin  20 mg Oral Daily Yobani Goodwin MD      senna  2 tablet Oral Daily Yobani Goodwin MD       Continuous Infusions:   PRN Meds:   acetaminophen    calcium carbonate    ondansetron       ROS:   Review of Systems   Unable to perform ROS: Mental status change         Vitals:   /67   Pulse 88   Temp 98 2 °F (36 8 °C)   Resp 18   Wt 59 kg (130 lb)   SpO2 95%   BMI 21 63 kg/m²       Physical Exam:   Physical Exam  Vitals and nursing note reviewed  Constitutional:       General: He is not in acute distress  Appearance: Normal appearance  He is not ill-appearing  HENT:      Head: Normocephalic  Mouth/Throat:      Mouth: Mucous membranes are dry  Pharynx: Oropharynx is clear  Eyes:      General: No scleral icterus  Right eye: No discharge  Left eye: No discharge  Extraocular Movements: Extraocular movements intact and EOM normal       Conjunctiva/sclera: Conjunctivae normal    Cardiovascular:      Rate and Rhythm: Normal rate  Pulmonary:      Effort: Pulmonary effort is normal  No respiratory distress     Musculoskeletal: General: Normal range of motion  Cervical back: Normal range of motion  Skin:     General: Skin is warm and dry  Coloration: Skin is not jaundiced or pale  Neurological:      Mental Status: He is alert  Psychiatric:         Mood and Affect: Mood normal        Neurologic Exam     Mental Status   Level of consciousness: alert  Able to state he is at [facility], that it is April 2022, Ina Hadley is current POTUS and Trcharbel was the most recent POTUS prior to him  Able to follow commands appropriately  No dysarthria noted  Able to name objects correctly  Cranial Nerves     CN II   Visual fields full to confrontation  CN III, IV, VI   Extraocular motions are normal      CN V   Facial sensation intact  CN VII   Facial expression full, symmetric  CN VIII   Hearing: intact    CN IX, X   Palate: symmetric    CN XI   CN XI normal      CN XII   CN XII normal      Motor Exam   Muscle bulk: normal  Bilateral UE strength 5/5 deltoids, biceps, triceps, hand   Able to raise bilateral LE up and hold antigravity with eventual drift down to bed  Bilateral dorsiflexion and plantar flexion 5/5     Sensory Exam   Light touch normal      Gait, Coordination, and Reflexes     Tremor   Resting tremor: absent    Reflexes   Right plantar: normal  Left plantar: normal          Labs: I have personally reviewed pertinent reports     Recent Results (from the past 24 hour(s))   Basic metabolic panel    Collection Time: 04/05/22  4:57 AM   Result Value Ref Range    Sodium 138 136 - 145 mmol/L    Potassium 3 4 (L) 3 5 - 5 3 mmol/L    Chloride 105 100 - 108 mmol/L    CO2 24 21 - 32 mmol/L    ANION GAP 9 4 - 13 mmol/L    BUN 48 (H) 5 - 25 mg/dL    Creatinine 1 99 (H) 0 60 - 1 30 mg/dL    Glucose 96 65 - 140 mg/dL    Calcium 9 0 8 3 - 10 1 mg/dL    eGFR 27 ml/min/1 73sq m   CBC and differential    Collection Time: 04/05/22  4:57 AM   Result Value Ref Range    WBC 9 76 4 31 - 10 16 Thousand/uL    RBC 3 73 (L) 3 88 - 5 62 Million/uL    Hemoglobin 12 1 12 0 - 17 0 g/dL    Hematocrit 36 6 36 5 - 49 3 %    MCV 98 82 - 98 fL    MCH 32 4 26 8 - 34 3 pg    MCHC 33 1 31 4 - 37 4 g/dL    RDW 13 5 11 6 - 15 1 %    MPV 11 5 8 9 - 12 7 fL    Platelets 839 034 - 109 Thousands/uL    nRBC 0 /100 WBCs    Neutrophils Relative 81 (H) 43 - 75 %    Immat GRANS % 0 0 - 2 %    Lymphocytes Relative 9 (L) 14 - 44 %    Monocytes Relative 7 4 - 12 %    Eosinophils Relative 2 0 - 6 %    Basophils Relative 1 0 - 1 %    Neutrophils Absolute 7 91 (H) 1 85 - 7 62 Thousands/µL    Immature Grans Absolute 0 03 0 00 - 0 20 Thousand/uL    Lymphocytes Absolute 0 87 0 60 - 4 47 Thousands/µL    Monocytes Absolute 0 66 0 17 - 1 22 Thousand/µL    Eosinophils Absolute 0 23 0 00 - 0 61 Thousand/µL    Basophils Absolute 0 06 0 00 - 0 10 Thousands/µL   Magnesium    Collection Time: 04/05/22  4:57 AM   Result Value Ref Range    Magnesium 2 8 (H) 1 6 - 2 6 mg/dL   Phosphorus    Collection Time: 04/05/22  4:57 AM   Result Value Ref Range    Phosphorus 2 5 2 3 - 4 1 mg/dL       Imaging: I have personally reviewed pertinent imaging in PACS, and I have personally reviewed PACS reports  EKG, Pathology, and Other Studies: I have personally reviewed pertinent reports  VTE Prophylaxis: Sequential compression device (Venodyne)         Total time spent today 27 minutes  Greater than 50% of total time was spent with the patient and / or family counseling and / or coordination of care  A description of the counseling / coordination of care: Patient seen and evaluated  Case reviewed with attending  Chart thoroughly reviewed including imaging and labs  Coordination of care with RN

## 2022-04-05 NOTE — CASE MANAGEMENT
Case Management Discharge Planning Note    Patient name Mili Kim  Location [facility] 905/[facility] 151-52 MRN [id number]  : 1926 Date 2022       Current Admission Date: 4/3/2022  Current Admission Diagnosis:Acute encephalopathy   Patient Active Problem List    Diagnosis Date Noted    Severe protein calorie malnutrition  2022    Thrombocytopenia  2022    Leukocytosis 2022    Acute encephalopathy 2022    Hypokalemia 2022    Atrial fibrillation possibly new onset 2022    Cognitive decline 2022    PVC's (premature ventricular contractions) 2022    Chronic kidney disease stage 4 2021    Chronic kidney disease-mineral and bone disorder 2021    Chronic combined systolic and diastolic CHF     CKD (chronic kidney disease) stage 4, GFR 15-29 ml/min ([facility]) 2013    Coronary artery disease 2013    Essential hypertension 2013    Benign hypertensive CKD, stage 1-4 or unspecified chronic kidney disease 2013    Hypercholesterolemia 10/08/2012      LOS (days): 1  Geometric Mean LOS (GMLOS) (days): 4 50  Days to GMLOS:3 7     OBJECTIVE:  Risk of Unplanned Readmission Score: 18         Current admission status: Inpatient   [facility]   [address] [address] [place] [address]  Phone: [phone] Fax: [phone]    Primary Care Provider: Rogerio Muse DO    Primary Insurance: MEDICARE  Secondary Insurance: BLUE CROSS    DISCHARGE DETAILS:              Other Referral/Resources/Interventions Provided:  Referral Comments: [facility] accepted the patient for admission when the patient is medically stable for discharge  They indicated that they do have the patients financial information on file    They will need a negative COVID test prior to [facility] (MD is aware)         Treatment Team Recommendation: Short Term Rehab  Discharge Destination Plan[de-identified] Short Term Rehab

## 2022-04-05 NOTE — PROGRESS NOTES
[address] [facility]  Progress Note - Nolvia Smoke 12/6/1926, 80 y o  male MRN: [id number]  Unit/Bed#: [facility] 905-01 Encounter: [id number]  Primary Care Provider: Carole Mohamud DO   Date and time admitted to hospital: 4/3/2022 11:13 AM      * Acute encephalopathy  Assessment & Plan  Etiology unclear at this time  CT of head negative for acute intracranial etiology  Urinalysis negative for signs of infection - TSH/ammonia levels within normal limits  EEG normal - MRI of brain and MR angiogram of the carotids MR angiogram of head noting a poorly visualized M1 segment of the right MCA possibly occlusion vs technical artifact, however, no further workup per neurology  Questionable new onset atrial fibrillation noted on admission - stroke workup pending as above  Mentation improved over the last few days but still intermittently sluggish in responses  Appreciate PT/OT input -> recommending skilled rehab once medically stable    Suspected atrial fibrillation possibly new onset  Assessment & Plan  As reported on admission -> appreciate cardiology input noting EKG shows sinus rhythm with PACs rather than true atrial fibrillation  Rate controlled on Lopressor - not chronically on anticoagulation, however, on dual anti-platelet therapy with ASA/Plavix  In the setting of altered mentation on presentation, stroke workup ongoing  Echocardiogram earlier this year revealed mild biatrial dilatation    Chronic combined systolic and diastolic CHF  Assessment & Plan  Last EF of 25% in February 2022 w/ severe global LV hypokinesis, mild biatrial dilatation, mild-moderate AR/TR, and moderate MR  On diuresis w/ Lasix - on beta-blockade w/ Lopressor - on afterload reduction w/ Imdur    Chronic kidney disease stage 4  Assessment & Plan  Baseline creatinine approximately 1 8-2 0 - currently @ 1 99  Monitor renal function and urine output - limit/avoid nephrotoxins if possible - avoid hypotension as possible  Note chronic Lasix use    Coronary artery disease  Assessment & Plan  On dual anti-platelet therapy with ASA/Plavix - continue statin/Lopressor/Imdur  S/p CABG    Essential hypertension  Assessment & Plan  Continue Lopressor    Severe protein calorie malnutrition   Assessment & Plan  BMI of 21 63 in the setting of aging coupled with decreased appetite/oral intake and evidence of severe muscle wasting/atrophy and subcutaneous fat loss on exam   Initiated Ensure nutritional supplementation w/ meals    Hypokalemia  Assessment & Plan  Likely multifactorial secondary to oral intake and chronic diuretic use  Monitor/replete potassium and magnesium as necessary    Thrombocytopenia   Assessment & Plan  Likely reactive acute medical issue(s)  Monitor platelet count    Leukocytosis  Assessment & Plan  Likely reactive acute medical issue(s)  Monitor WBC count - currently normalized      DVT Prophylaxis:  Heparin SC      Patient Centered Rounds:  I have performed bedside rounds and discussed plan of care with nursing today  Discussions with Specialists or Other Care Team Provider:  see above assessments if applicable    Education and Discussions with Family / Patient:  Patient at bedside, along with son, Lynette Diaz, over the phone today  Time Spent for Care:  32 minutes  More than 50% of total time spent on counseling and coordination of care as described above  Current Length of Stay: 1 day(s)    Current Patient Status: Inpatient   Certification Statement:  Patient will continue to require additional hospital stay due to assessments as noted above  Code Status: Level 1 - Full Code        Subjective:     Seen/examined earlier in the day  Resting bed fairly comfortably but still acknowledges weakness/fatigue  He is alert and awake in response to simple questioning with occasional sluggish responses  Denies other acute complaints this time          Objective:     Vitals:   Temp (24hrs), Av 1 °F (36 7 °C), Min:96 9 °F (36 1 °C), Max:98 6 °F (37 °C)    Temp:  [96 9 °F (36 1 °C)-98 6 °F (37 °C)] 98 3 °F (36 8 °C)  HR:  [78-88] 87  Resp:  [17-19] 18  BP: (108-129)/(64-78) 129/78  SpO2:  [94 %-96 %] 95 %  Body mass index is 21 63 kg/m²  Input and Output Summary (last 24 hours): Intake/Output Summary (Last 24 hours) at 4/5/2022 1559  Last data filed at 4/5/2022 1330  Gross per 24 hour   Intake 340 ml   Output 1400 ml   Net -1060 ml       Physical Exam:     GENERAL:  Weak/fatigued - frail/cachectic  HEAD:  Normocephalic - atraumatic - temporal wasting present  EYES: PERRL - EOMI   MOUTH:  Mucosa moist  NECK:  Supple - full range of motion - clavicular wasting present  CARDIAC:  Rate controlled currently - S1/S2 positive  PULMONARY:  Clear breath sounds bilaterally - nonlabored respirations  ABDOMEN:  Soft - nontender/nondistended - active bowel sounds  MUSCULOSKELETAL:  Motor strength/range of motion deconditioned  NEUROLOGIC:  Awake and oriented to name/place/time but sluggish in responses  SKIN:  Chronic wrinkles/blemishes   PSYCHIATRIC:  Mood/affect remains flat      Additional Data:     Labs & Recent Cultures:    Results from last 7 days   Lab Units 04/05/22  0457   WBC Thousand/uL 9 76   HEMOGLOBIN g/dL 12 1   HEMATOCRIT % 36 6   PLATELETS Thousands/uL 155   NEUTROS PCT % 81*   LYMPHS PCT % 9*   MONOS PCT % 7   EOS PCT % 2     Results from last 7 days   Lab Units 04/05/22  0457 04/04/22  0503 04/03/22  1206   POTASSIUM mmol/L 3 4*   < > 3 0*   CHLORIDE mmol/L 105   < > 104   CO2 mmol/L 24   < > 28   BUN mg/dL 48*   < > 40*   CREATININE mg/dL 1 99*   < > 2 07*   CALCIUM mg/dL 9 0   < > 8 9   ALK PHOS U/L  --   --  87   ALT U/L  --   --  38   AST U/L  --   --  18    < > = values in this interval not displayed           Results from last 7 days   Lab Units 04/03/22  1206   POC GLUCOSE mg/dl 104     Results from last 7 days   Lab Units 04/03/22  1206   HEMOGLOBIN A1C % 5 6     Results from last 7 days   Lab Units 04/03/22  1206   LACTIC ACID mmol/L 1 3                 Last 24 Hours Medication List:   Current Facility-Administered Medications   Medication Dose Route Frequency Provider Last Rate    acetaminophen  650 mg Oral Q6H PRN Wai Simon MD      aspirin  81 mg Oral Daily Wai Simon MD      calcium carbonate  1,000 mg Oral Daily PRN Wai Simon MD      clopidogrel  75 mg Oral Daily Wai Simon MD      docusate sodium  100 mg Oral BID Wai Simon MD      furosemide  20 mg Oral Daily Andres Trinh MD      heparin (porcine)  5,000 Units Subcutaneous [facility] Wai Simon MD      isosorbide mononitrate  15 mg Oral Daily Wai Simon MD      metoprolol tartrate  25 mg Oral BID Wai Simon MD      ondansetron  4 mg Intravenous Q6H PRN Wai Simon MD      pravastatin  20 mg Oral Daily Wai Simon MD      senna  2 tablet Oral Daily Wai Simon MD                    ** Please Note: This note is constructed using a voice recognition dictation system  An occasional wrong word/phrase or sound-a-like substitution may have been picked up by dictation device due to the inherent limitations of voice recognition software  Read the chart carefully and recognize, using reasonable context, where substitutions may have occurred  **

## 2022-04-05 NOTE — PROGRESS NOTES
Cardiology Team 2 Progress Note - Olga Hernandez  male MRN: [id number]    Unit/Bed#: [facility] 905-01 Encounter: [id number]          Subjective:   Patient seen and examined  No significant events overnight  No complains this AM     Hospital Course:   Billy Ley is a 80y o  year old male with a history of CKD3, HFrEF (25%) 2/2 ICM with a prior CABG and HLD//HTN who presented on 4/3/22 with mental status change while at [facility]  Was having breakfast in his usual state of health and he went back to his room, family found him to be minimally responsive and brought to the ED for further evaluation  Patient was noted to have atrial fibrillation while in the ED which is suspected to be new onset      CT Head on admission was negative for acute intracranial pathology      Of note as per documentation, as per patients son, patient has been slightly declining over past few months  Son has noted multiple episodes over the past few weeks of altered mental status where patient will stare off aimless and not answer questions for a few seconds before coming to      Extensive discussion had with son at bedside who corroborated the aforementioned         Vitals: Blood pressure 110/67, pulse 83, temperature 98 6 °F (37 °C), resp  rate 18, weight 59 kg (130 lb), SpO2 96 %  , Body mass index is 21 63 kg/m² ,   Orthostatic Blood Pressures      Most Recent Value   Blood Pressure 110/67 filed at 04/05/2022 0741   Patient Position - Orthostatic VS Lying filed at 04/03/2022 1500            Intake/Output Summary (Last 24 hours) at 4/5/2022 0944  Last data filed at 4/5/2022 0201  Gross per 24 hour   Intake 220 ml   Output 1400 ml   Net -1180 ml       Review of System:  Review of system was conducted and was negative except for as stated in the subjective course      Physical Exam:    GEN: Billy Ley appears well, alert and oriented x 3, pleasant and cooperative   HEENT:  Normocephalic, atraumatic, anicteric, moist mucous membranes  NECK: No JVD or carotid bruits   HEART: reg rhythm, reg rate, normal S1 and S2, no murmurs, clicks, gallops or rubs   LUNGS: Clear to auscultation bilaterally; no wheezes, rales, or rhonchi; respiration nonlabored   ABDOMEN:  Normoactive bowel sounds, soft, no tenderness, no distention  EXTREMITIES: peripheral pulses palpable; no edema  NEURO: no gross focal findings; cranial nerves grossly intact   SKIN:  Dry, intact, warm to touch      Current Facility-Administered Medications:     acetaminophen (TYLENOL) tablet 650 mg, 650 mg, Oral, Q6H PRN, Willard Ribera MD    aspirin (ECOTRIN LOW STRENGTH) EC tablet 81 mg, 81 mg, Oral, Daily, Willard Ribera MD, 81 mg at 04/05/22 0853    calcium carbonate (TUMS) chewable tablet 1,000 mg, 1,000 mg, Oral, Daily PRN, Willard Ribera MD    clopidogrel (PLAVIX) tablet 75 mg, 75 mg, Oral, Daily, Willard Ribera MD, 75 mg at 04/05/22 0853    docusate sodium (COLACE) capsule 100 mg, 100 mg, Oral, BID, Willard Ribera MD, 100 mg at 04/05/22 0853    furosemide (LASIX) tablet 20 mg, 20 mg, Oral, Daily, Alma Solomon MD, 20 mg at 04/05/22 0853    heparin (porcine) subcutaneous injection 5,000 Units, 5,000 Units, Subcutaneous, Q8H [facility], 5,000 Units at 04/05/22 0547 **AND** [COMPLETED] Platelet count, , , Once, Willard Ribera MD    isosorbide mononitrate (IMDUR) 24 hr tablet 15 mg, 15 mg, Oral, Daily, Willard Ribera MD, 15 mg at 04/05/22 0855    metoprolol tartrate (LOPRESSOR) tablet 25 mg, 25 mg, Oral, BID, Willard Ribera MD, 25 mg at 04/05/22 0854    ondansetron (ZOFRAN) injection 4 mg, 4 mg, Intravenous, Q6H PRN, Willard Ribera MD    pravastatin (PRAVACHOL) tablet 20 mg, 20 mg, Oral, Daily, Willard Ribera MD, 20 mg at 04/05/22 0854    senna (SENOKOT) tablet 17 2 mg, 2 tablet, Oral, Daily, Willard Ribera MD, 17 2 mg at 04/05/22 0854    Labs & Results:          Results from last 7 days   Lab Units 04/05/22  0457 04/04/22  0503 04/03/22  1206   POTASSIUM mmol/L 3 4* 3 3* 3 0*   CO2 mmol/L 24 23 28   CHLORIDE mmol/L 105 109* 104   BUN mg/dL 48* 35* 40*   CREATININE mg/dL 1 99* 1 81* 2 07*     Results from last 7 days   Lab Units 04/05/22  0457 04/04/22  0503 04/03/22  1721 04/03/22  1206 04/03/22  1206   HEMOGLOBIN g/dL 12 1 12 1  --   --  12 5   HEMATOCRIT % 36 6 36 6  --   --  38 3   PLATELETS Thousands/uL 155 143* 139*   < > 147*    < > = values in this interval not displayed  Results from last 7 days   Lab Units 04/03/22  1206   TRIGLYCERIDES mg/dL 78   HDL mg/dL 50   LDL CALC mg/dL 65   HEMOGLOBIN A1C % 5 6       Telemetry:   Personally reviewed by Angelic Baltazar MD:     VTE Prophylaxis: Heparin        Assessment:  Principal Problem:    Acute encephalopathy  Active Problems:    Chronic combined systolic and diastolic CHF    Chronic kidney disease stage 4    Coronary artery disease    Essential hypertension    Hypokalemia    Atrial fibrillation possibly new onset    Severe protein calorie malnutrition     Thrombocytopenia     Leukocytosis    1  Unlikely New Onset Atrial Fibrillation  --> [id number]: 5  --> NSR with PACs on EKG and Telemetry monitor  2  HFrEF (25%) 2/2 ICM with a prior CABG (Unclear territories)  --> Lexiscan in 2020 showing medium sized, moderate intensity fixed perfusion defect in the inferior wall region   --> has unfortunately had multiple recent admissions for exacerbation episodes  3  HLD//HTN  4  CKD3  --> Patient was previously on Valsartan however medication was discontinued given up trending Creatinine  5   Mental Status changes likely multifactorial in setting of aging with component of relative hypotension/hypoperfusion in setting of severe cardiomyopathy and fluid shifts while on diuretics  --> MRI has a "low flow" reference which could be related to low output given patients CHF     Plan:  - c/w Aspirin 81mg PO Daily  - c/w Plavix 75mg pO Daily  - Hold Lasix 20mg PO Daily given up trending Cr  --> Patient would likely benefit from PRN dosing of diuretic based on weight changes/symptoms of SOB/edema  Alternatively can consider dosing 2-3x weekly which would likely be easier to follow  Previously patient was on a PRN regimen however it seems that he would go extended periods without taking medication and be admitted with a CHF exacerbation  Will continue to have ongoing discussion with patient and son while still admitted  - c/w Imdur 15mg PO Daily  --> Lowest priority medication, if patient experiencing hypotensive episodes would opt to discontinue this medication first   - c/w Metoprolol Tart  25mg PO BID  - c/w Pravachol 20mg PO QHS  - No need for AC at this time as MRI was not consistent with thromboembolic events,  - Replete electrolytes PRN with Mg/Phos/K goals of 2/3/4 respectively  - Patient is pending hospice care evaluation at his living facility    Case discussed and reviewed with Dr Orlin Bailon who agrees with my assessment and plan  Thank you for involving us in the care of your patient  Jean Mayo MD  Cardiology Fellow PGY-5      Epic/ Allscripts/Care Everywhere records reviewed:     ** Please Note: Fluency DirectDictation voice to text software may have been used in the creation of this document   **

## 2022-04-06 LAB
ANION GAP SERPL CALCULATED.3IONS-SCNC: 6 MMOL/L (ref 4–13)
BASOPHILS # BLD AUTO: 0.09 THOUSANDS/ΜL (ref 0–0.1)
BASOPHILS NFR BLD AUTO: 1 % (ref 0–1)
BUN SERPL-MCNC: 48 MG/DL (ref 5–25)
CALCIUM SERPL-MCNC: 9 MG/DL (ref 8.3–10.1)
CHLORIDE SERPL-SCNC: 100 MMOL/L (ref 100–108)
CO2 SERPL-SCNC: 25 MMOL/L (ref 21–32)
CREAT SERPL-MCNC: 2 MG/DL (ref 0.6–1.3)
EOSINOPHIL # BLD AUTO: 0.27 THOUSAND/ΜL (ref 0–0.61)
EOSINOPHIL NFR BLD AUTO: 2 % (ref 0–6)
ERYTHROCYTE [DISTWIDTH] IN BLOOD BY AUTOMATED COUNT: 13.5 % (ref 11.6–15.1)
FLUAV RNA RESP QL NAA+PROBE: NEGATIVE
FLUBV RNA RESP QL NAA+PROBE: NEGATIVE
GFR SERPL CREATININE-BSD FRML MDRD: 27 ML/MIN/1.73SQ M
GLUCOSE SERPL-MCNC: 104 MG/DL (ref 65–140)
HCT VFR BLD AUTO: 36.3 % (ref 36.5–49.3)
HGB BLD-MCNC: 12.5 G/DL (ref 12–17)
IMM GRANULOCYTES # BLD AUTO: 0.07 THOUSAND/UL (ref 0–0.2)
IMM GRANULOCYTES NFR BLD AUTO: 1 % (ref 0–2)
LYMPHOCYTES # BLD AUTO: 1.06 THOUSANDS/ΜL (ref 0.6–4.47)
LYMPHOCYTES NFR BLD AUTO: 9 % (ref 14–44)
MAGNESIUM SERPL-MCNC: 2.4 MG/DL (ref 1.6–2.6)
MCH RBC QN AUTO: 32.9 PG (ref 26.8–34.3)
MCHC RBC AUTO-ENTMCNC: 34.4 G/DL (ref 31.4–37.4)
MCV RBC AUTO: 96 FL (ref 82–98)
MONOCYTES # BLD AUTO: 0.97 THOUSAND/ΜL (ref 0.17–1.22)
MONOCYTES NFR BLD AUTO: 8 % (ref 4–12)
NEUTROPHILS # BLD AUTO: 9.19 THOUSANDS/ΜL (ref 1.85–7.62)
NEUTS SEG NFR BLD AUTO: 79 % (ref 43–75)
NRBC BLD AUTO-RTO: 0 /100 WBCS
PHOSPHATE SERPL-MCNC: 2.7 MG/DL (ref 2.3–4.1)
PLATELET # BLD AUTO: 153 THOUSANDS/UL (ref 149–390)
PMV BLD AUTO: 11.4 FL (ref 8.9–12.7)
POTASSIUM SERPL-SCNC: 3.7 MMOL/L (ref 3.5–5.3)
RBC # BLD AUTO: 3.8 MILLION/UL (ref 3.88–5.62)
RSV RNA RESP QL NAA+PROBE: NEGATIVE
SARS-COV-2 RNA RESP QL NAA+PROBE: NEGATIVE
SODIUM SERPL-SCNC: 131 MMOL/L (ref 136–145)
WBC # BLD AUTO: 11.65 THOUSAND/UL (ref 4.31–10.16)

## 2022-04-06 PROCEDURE — 99232 SBSQ HOSP IP/OBS MODERATE 35: CPT | Performed by: INTERNAL MEDICINE

## 2022-04-06 PROCEDURE — 0241U HB NFCT DS VIR RESP RNA 4 TRGT: CPT | Performed by: INTERNAL MEDICINE

## 2022-04-06 PROCEDURE — 83735 ASSAY OF MAGNESIUM: CPT | Performed by: INTERNAL MEDICINE

## 2022-04-06 PROCEDURE — 99231 SBSQ HOSP IP/OBS SF/LOW 25: CPT | Performed by: INTERNAL MEDICINE

## 2022-04-06 PROCEDURE — 80048 BASIC METABOLIC PNL TOTAL CA: CPT | Performed by: INTERNAL MEDICINE

## 2022-04-06 PROCEDURE — 84100 ASSAY OF PHOSPHORUS: CPT | Performed by: INTERNAL MEDICINE

## 2022-04-06 PROCEDURE — 85025 COMPLETE CBC W/AUTO DIFF WBC: CPT | Performed by: INTERNAL MEDICINE

## 2022-04-06 RX ADMIN — DOCUSATE SODIUM 100 MG: 100 CAPSULE, LIQUID FILLED ORAL at 09:37

## 2022-04-06 RX ADMIN — SENNOSIDES 17.2 MG: 8.6 TABLET, FILM COATED ORAL at 09:37

## 2022-04-06 RX ADMIN — PRAVASTATIN SODIUM 20 MG: 20 TABLET ORAL at 09:36

## 2022-04-06 RX ADMIN — HEPARIN SODIUM 5000 UNITS: 5000 INJECTION INTRAVENOUS; SUBCUTANEOUS at 22:11

## 2022-04-06 RX ADMIN — HEPARIN SODIUM 5000 UNITS: 5000 INJECTION INTRAVENOUS; SUBCUTANEOUS at 14:42

## 2022-04-06 RX ADMIN — DOCUSATE SODIUM 100 MG: 100 CAPSULE, LIQUID FILLED ORAL at 17:26

## 2022-04-06 RX ADMIN — ASPIRIN 81 MG: 81 TABLET, COATED ORAL at 09:37

## 2022-04-06 RX ADMIN — HEPARIN SODIUM 5000 UNITS: 5000 INJECTION INTRAVENOUS; SUBCUTANEOUS at 05:26

## 2022-04-06 RX ADMIN — CLOPIDOGREL BISULFATE 75 MG: 75 TABLET ORAL at 09:36

## 2022-04-06 NOTE — PROGRESS NOTES
Cardiology Team 2 Progress Note - Jean-Pierre Purcell  male MRN: [id number]    Unit/Bed#: [facility] 905-01 Encounter: [id number]          Subjective:   Patient seen and examined  No significant events overnight  No complains this AM     Hospital Course:   Martin Lancaster is a 80y o  year old male with a history of CKD3, HFrEF (25%) 2/2 ICM with a prior CABG and HLD//HTN who presented on 4/3/22 with mental status change while at [facility]  Was having breakfast in his usual state of health and he went back to his room, family found him to be minimally responsive and brought to the ED for further evaluation  Patient was noted to have atrial fibrillation while in the ED which is suspected to be new onset      CT Head on admission was negative for acute intracranial pathology      Of note as per documentation, as per patients son, patient has been slightly declining over past few months  Son has noted multiple episodes over the past few weeks of altered mental status where patient will stare off aimless and not answer questions for a few seconds before coming to      Extensive discussion had with son at bedside who corroborated the aforementioned         Vitals: Blood pressure 107/71, pulse 94, temperature 98 9 °F (37 2 °C), resp  rate 20, weight 59 kg (130 lb), SpO2 93 %  , Body mass index is 21 63 kg/m² ,   Orthostatic Blood Pressures      Most Recent Value   Blood Pressure 107/71 filed at 04/06/2022 9754   Patient Position - Orthostatic VS Lying filed at 04/06/2022 0336            Intake/Output Summary (Last 24 hours) at 4/6/2022 1016  Last data filed at 4/6/2022 0901  Gross per 24 hour   Intake 600 ml   Output 1300 ml   Net -700 ml       Review of System:  Review of system was conducted and was negative except for as stated in the subjective course      Physical Exam:    GEN: Martin Lancaster appears well, alert and oriented x 3, pleasant and cooperative   HEENT:  Normocephalic, atraumatic, anicteric, moist mucous membranes  NECK: No JVD or carotid bruits   HEART: reg rhythm, reg rate, normal S1 and S2, no murmurs, clicks, gallops or rubs   LUNGS: Clear to auscultation bilaterally; no wheezes, rales, or rhonchi; respiration nonlabored   ABDOMEN:  Normoactive bowel sounds, soft, no tenderness, no distention  EXTREMITIES: peripheral pulses palpable; no edema  NEURO: no gross focal findings; cranial nerves grossly intact   SKIN:  Dry, intact, warm to touch      Current Facility-Administered Medications:     acetaminophen (TYLENOL) tablet 650 mg, 650 mg, Oral, Q6H PRN, Tricia Adam MD    aspirin (ECOTRIN LOW STRENGTH) EC tablet 81 mg, 81 mg, Oral, Daily, Tricia Adam MD, 81 mg at 04/06/22 2466    bisacodyl (DULCOLAX) rectal suppository 10 mg, 10 mg, Rectal, Daily, Jude Coombs MD    calcium carbonate (TUMS) chewable tablet 1,000 mg, 1,000 mg, Oral, Daily PRN, Tricia Adam MD    clopidogrel (PLAVIX) tablet 75 mg, 75 mg, Oral, Daily, Tricia Adam MD, 75 mg at 04/06/22 0936    docusate sodium (COLACE) capsule 100 mg, 100 mg, Oral, BID, Tricia Adam MD, 100 mg at 04/06/22 0937    furosemide (LASIX) tablet 20 mg, 20 mg, Oral, Daily, Rudy Jorge MD, 20 mg at 04/05/22 0853    heparin (porcine) subcutaneous injection 5,000 Units, 5,000 Units, Subcutaneous, Q8H [address], 5,000 Units at 04/06/22 0526 **AND** [COMPLETED] Platelet count, , , Once, Tricia Adam MD    isosorbide mononitrate (IMDUR) 24 hr tablet 15 mg, 15 mg, Oral, Daily, Tricia Adam MD, 15 mg at 04/05/22 0855    metoprolol tartrate (LOPRESSOR) tablet 25 mg, 25 mg, Oral, BID, Tricia Adam MD, 25 mg at 04/05/22 1739    ondansetron (ZOFRAN) injection 4 mg, 4 mg, Intravenous, Q6H PRN, Tricia Adam MD    pravastatin (PRAVACHOL) tablet 20 mg, 20 mg, Oral, Daily, Tricia Adam MD, 20 mg at 04/06/22 0936    senna (SENOKOT) tablet 17 2 mg, 2 tablet, Oral, Daily, Tricia Adam MD, 17 2 mg at 04/06/22 0300    Labs & Results: Results from last 7 days   Lab Units 04/06/22  0527 04/05/22  0457 04/04/22  0503   POTASSIUM mmol/L 3 7 3 4* 3 3*   CO2 mmol/L 25 24 23   CHLORIDE mmol/L 100 105 109*   BUN mg/dL 48* 48* 35*   CREATININE mg/dL 2 00* 1 99* 1 81*     Results from last 7 days   Lab Units 04/06/22  0527 04/05/22  0457 04/04/22  0503   HEMOGLOBIN g/dL 12 5 12 1 12 1   HEMATOCRIT % 36 3* 36 6 36 6   PLATELETS Thousands/uL 153 155 143*     Results from last 7 days   Lab Units 04/03/22  1206   TRIGLYCERIDES mg/dL 78   HDL mg/dL 50   LDL CALC mg/dL 65   HEMOGLOBIN A1C % 5 6       Telemetry:   Personally reviewed by Yesica Valdez MD:     VTE Prophylaxis: Heparin        Assessment:  Principal Problem:    Acute encephalopathy  Active Problems:    Chronic combined systolic and diastolic CHF    Chronic kidney disease stage 4    Coronary artery disease    Essential hypertension    Hypokalemia    Atrial fibrillation possibly new onset    Severe protein calorie malnutrition     Thrombocytopenia     Leukocytosis    1  Unlikely New Onset Atrial Fibrillation  --> NSR with PACs on EKG and Telemetry monitor  2  HFrEF (25%) 2/2 ICM with a prior CABG (Unclear territories)  --> Lexiscan in 2020 showing medium sized, moderate intensity fixed perfusion defect in the inferior wall region   --> Has unfortunately had multiple recent admissions for exacerbation episodes  3  HLD//HTN  4  CKD3  --> Patient was previously on Valsartan however medication was discontinued given up trending Creatinine  5  Mental Status changes likely multifactorial in setting of aging with component of relative hypotension/hypoperfusion in setting of severe cardiomyopathy and fluid shifts while on diuretics  --> MRI has a "low flow" reference which could be related to low output given patients CHF  --   No evidence of emboli on imaging     Plan:  - c/w Aspirin 81mg PO Daily  - c/w Plavix 75mg pO Daily  - Continue to hold Lasix 20mg PO Daily for one more day (Patient got a dose yesterday at 9AM)  --> When resuming, would opt for a M/W/F dosing regimen  - c/w Imdur 15mg PO Daily  --> Lowest priority medication, if patient experiencing hypotensive episodes would opt to discontinue this medication first   - c/w Metoprolol Tart  25mg PO BID  - c/w Pravachol 20mg PO QHS  - No need for AC at this time as MRI was not consistent with thromboembolic events,  - Replete electrolytes PRN with Mg/Phos/K goals of 2/3/4 respectively  - Patient is pending hospice care evaluation at his living facility    Case discussed and reviewed with Dr Queen Jaeger who agrees with my assessment and plan  Thank you for involving us in the care of your patient  Papa MD Elly  Cardiology Fellow PGY-5      Epic/ Allscripts/Care Everywhere records reviewed:     ** Please Note: Fluency DirectDictation voice to text software may have been used in the creation of this document   **

## 2022-04-06 NOTE — CASE MANAGEMENT
Case Management Discharge Planning Note    Patient name Paulo Joaquin  Location [facility] 905/[facility] 806-67 MRN [id number]  : 1926 Date 2022       Current Admission Date: 4/3/2022  Current Admission Diagnosis:Acute encephalopathy   Patient Active Problem List    Diagnosis Date Noted    Severe protein calorie malnutrition  2022    Thrombocytopenia  2022    Leukocytosis 2022    Acute encephalopathy 2022    Hypokalemia 2022    Atrial fibrillation possibly new onset 2022    Cognitive decline 2022    PVC's (premature ventricular contractions) 2022    Chronic kidney disease stage 4 2021    Chronic kidney disease-mineral and bone disorder 2021    Chronic combined systolic and diastolic CHF 5619    CKD (chronic kidney disease) stage 4, GFR 15-29 ml/min ([facility]) 2013    Coronary artery disease 2013    Essential hypertension 2013    Benign hypertensive CKD, stage 1-4 or unspecified chronic kidney disease 2013    Hypercholesterolemia 10/08/2012      LOS (days): 2  Geometric Mean LOS (GMLOS) (days): 4 50  Days to GMLOS:2 5     OBJECTIVE:  Risk of Unplanned Readmission Score: 19         Current admission status: Inpatient   [facility] [address] [facility] [address] [facility]  Phone: [phone] Fax: [phone]    Primary Care Provider: Nata Figueroa DO    Primary Insurance: MEDICARE  Secondary Insurance: BLUE CROSS    DISCHARGE DETAILS:    Discharge planning discussed with[de-identified] Spencer Alfred (son) and patient  Freedom of Choice: Yes     CM contacted family/caregiver?: Yes  Were Treatment Team discharge recommendations reviewed with patient/caregiver?: Yes  Did patient/caregiver verbalize understanding of patient care needs?: Yes  Were patient/caregiver advised of the risks associated with not following Treatment Team discharge recommendations?: Yes    Contacts  Patient Contacts: son Gabriela Gonzalez  Relationship to Patient[de-identified] Family  Contact Method: In Person  Reason/Outcome: Continuity of Care,Emergency Contact,Discharge Planning,Referral              Other Referral/Resources/Interventions Provided:  Referral Comments: [facility] has accepted the patient - they are able to accept the patient tomorrow  Both son and patient aware  COVID test needed prior to transfer  COVID test currently ordered - awaiting results           Treatment Team Recommendation: Short Term Rehab  Discharge Destination Plan[de-identified] Short Term Rehab                                         Additional Comments: patient will need transport to [facility] tomorrow after COVID test results - transport requested at this time

## 2022-04-06 NOTE — PROGRESS NOTES
[address] [facility]  Progress Note - Mgea Mccurdy 12/6/1926, 80 y o  male MRN: [id number]  Unit/Bed#: [facility] 905-01 Encounter: [id number]  Primary Care Provider: Terese Woodard DO   Date and time admitted to hospital: 4/3/2022 11:13 AM      * Acute encephalopathy  Assessment & Plan  Etiology fully unclear at this time  CT of head negative for acute intracranial etiology  Urinalysis negative for signs of infection - TSH/ammonia levels within normal limits  EEG normal - MRI of brain and MR angiogram of the carotids MR angiogram of head noting a poorly visualized M1 segment of the right MCA possibly occlusion vs technical artifact, however, no further workup per neurology  Questionable new onset atrial fibrillation noted on admission - stroke workup pending as above  Mentation improving to baseline over the last few days but still intermittently sluggish in responses - likelihood of underlying cognitive impairment noted  Appreciate PT/OT input -> plan for discharge to skilled rehab tomorrow    Suspected atrial fibrillation possibly new onset  Assessment & Plan  As reported on admission -> appreciate cardiology input noting EKG shows sinus rhythm with PACs rather than true atrial fibrillation  Rate controlled on Lopressor - not chronically on anticoagulation, however, on dual anti-platelet therapy with ASA/Plavix  In the setting of altered mentation on presentation, stroke workup ongoing  Echocardiogram earlier this year revealed mild biatrial dilatation    Chronic combined systolic and diastolic CHF  Assessment & Plan  Last EF of 25% in February 2022 w/ severe global LV hypokinesis, mild biatrial dilatation, mild-moderate AR/TR, and moderate MR  Diuresis w/ Lasix held currently due to soft blood pressures and hyponatremia today -> per cardiology, when resumed should implement a 3x/weekly regimen - on beta-blockade w/ Lopressor - on afterload reduction w/ Imdur    Chronic kidney disease stage 4  Assessment & Plan  Baseline creatinine approximately 1 8-2 0 - currently @ 2 0  Monitor renal function and urine output - limit/avoid nephrotoxins if possible - avoid hypotension as possible   Note chronic Lasix use    Coronary artery disease  Assessment & Plan  On dual anti-platelet therapy with ASA/Plavix - continue statin/Lopressor/Imdur  S/p CABG    Essential hypertension  Assessment & Plan  Continue Lopressor    Severe protein calorie malnutrition   Assessment & Plan  BMI of 21 63 in the setting of aging coupled with decreased appetite/oral intake and evidence of severe muscle wasting/atrophy and subcutaneous fat loss on exam   Initiated Ensure nutritional supplementation w/ meals    Hypokalemia  Assessment & Plan  Likely multifactorial secondary to oral intake and chronic diuretic use  Monitor/replete potassium and magnesium as necessary    Thrombocytopenia   Assessment & Plan  Likely reactive acute medical issue(s)  Monitor platelet count - normalized    Leukocytosis  Assessment & Plan  Likely reactive acute medical issue(s)  Monitor WBC count       DVT Prophylaxis:  Heparin SC      Patient Centered Rounds:  I have performed bedside rounds and discussed plan of care with nursing today  Discussions with Specialists or Other Care Team Provider:  see above assessments if applicable    Education and Discussions with Family / Patient:  Patient at bedside, along with son, Elmo Bryant, over the phone today  Time Spent for Care:  32 minutes  More than 50% of total time spent on counseling and coordination of care as described above  Current Length of Stay: 2 day(s)    Current Patient Status: Inpatient   Certification Statement:  Patient will continue to require additional hospital stay due to assessments as noted above  Code Status: Level 1 - Full Code        Subjective:     Seen/examined earlier in the day  No new complaints this time  Remains weak/fatigued          Objective:     Vitals:   Temp (24hrs), Av 1 °F (36 7 °C), Min:97 3 °F (36 3 °C), Max:98 9 °F (37 2 °C)    Temp:  [97 3 °F (36 3 °C)-98 9 °F (37 2 °C)] 97 3 °F (36 3 °C)  HR:  [] 105  Resp:  [20-24] 20  BP: ()/(63-78) 106/70  SpO2:  [93 %-97 %] 95 %  Body mass index is 21 63 kg/m²  Input and Output Summary (last 24 hours): Intake/Output Summary (Last 24 hours) at 2022 193  Last data filed at 2022 1337  Gross per 24 hour   Intake 960 ml   Output 1875 ml   Net -915 ml       Physical Exam:     GENERAL:  Remains weak/fatigued - frail/cachectic  HEAD:  Normocephalic - atraumatic - temporal wasting evident  EYES: PERRL - EOMI   MOUTH:  Mucosa moist  NECK:  Supple - full range of motion - clavicular wasting evident  CARDIAC:  Rate controlled currently - S1/S2 positive  PULMONARY:  Fairly clear to auscultation - nonlabored respirations  ABDOMEN:  Soft - nontender/nondistended - active bowel sounds  MUSCULOSKELETAL:  Motor strength/range of motion deconditioned  NEUROLOGIC:  Awake/alert with baseline cognitive impairment  SKIN:  Chronic wrinkles/blemishes   PSYCHIATRIC:  Mood/affect remains flat      Additional Data:     Labs & Recent Cultures:    Results from last 7 days   Lab Units 22  0527   WBC Thousand/uL 11 65*   HEMOGLOBIN g/dL 12 5   HEMATOCRIT % 36 3*   PLATELETS Thousands/uL 153   NEUTROS PCT % 79*   LYMPHS PCT % 9*   MONOS PCT % 8   EOS PCT % 2     Results from last 7 days   Lab Units 22  0527 22  0503 22  1206   POTASSIUM mmol/L 3 7   < > 3 0*   CHLORIDE mmol/L 100   < > 104   CO2 mmol/L 25   < > 28   BUN mg/dL 48*   < > 40*   CREATININE mg/dL 2 00*   < > 2 07*   CALCIUM mg/dL 9 0   < > 8 9   ALK PHOS U/L  --   --  87   ALT U/L  --   --  38   AST U/L  --   --  18    < > = values in this interval not displayed           Results from last 7 days   Lab Units 22  1206   POC GLUCOSE mg/dl 104     Results from last 7 days   Lab Units 22  1206   HEMOGLOBIN A1C % 5 6     Results from last 7 days   Lab Units 04/03/22  1206   LACTIC ACID mmol/L 1 3                 Last 24 Hours Medication List:   Current Facility-Administered Medications   Medication Dose Route Frequency Provider Last Rate    acetaminophen  650 mg Oral Q6H PRN Tanya Mohamud MD      aspirin  81 mg Oral Daily Tanya Mohamud MD      bisacodyl  10 mg Rectal Daily Viki Knott MD      calcium carbonate  1,000 mg Oral Daily PRN Tanya Mohamud MD      clopidogrel  75 mg Oral Daily Tanya Mohamud MD      docusate sodium  100 mg Oral BID Tanya Mohamud MD      heparin (porcine)  5,000 Units Subcutaneous [facility] Tanya Mohamud MD      isosorbide mononitrate  15 mg Oral Daily Tanya Mohamud MD      metoprolol tartrate  25 mg Oral BID Tanya Mohamud MD      ondansetron  4 mg Intravenous Q6H PRN Tanya Mohamud MD      pravastatin  20 mg Oral Daily Tanya Mohamud MD      senna  2 tablet Oral Daily Tanya Mohamud MD                    ** Please Note: This note is constructed using a voice recognition dictation system  An occasional wrong word/phrase or sound-a-like substitution may have been picked up by dictation device due to the inherent limitations of voice recognition software  Read the chart carefully and recognize, using reasonable context, where substitutions may have occurred  **

## 2022-04-07 VITALS
HEART RATE: 98 BPM | SYSTOLIC BLOOD PRESSURE: 116 MMHG | DIASTOLIC BLOOD PRESSURE: 75 MMHG | BODY MASS INDEX: 21.63 KG/M2 | RESPIRATION RATE: 18 BRPM | OXYGEN SATURATION: 94 % | TEMPERATURE: 97.8 F | WEIGHT: 130 LBS

## 2022-04-07 LAB
ANION GAP SERPL CALCULATED.3IONS-SCNC: 8 MMOL/L (ref 4–13)
BASOPHILS # BLD AUTO: 0.05 THOUSANDS/ΜL (ref 0–0.1)
BASOPHILS NFR BLD AUTO: 1 % (ref 0–1)
BUN SERPL-MCNC: 49 MG/DL (ref 5–25)
CALCIUM SERPL-MCNC: 9.3 MG/DL (ref 8.3–10.1)
CHLORIDE SERPL-SCNC: 99 MMOL/L (ref 100–108)
CO2 SERPL-SCNC: 25 MMOL/L (ref 21–32)
CREAT SERPL-MCNC: 1.78 MG/DL (ref 0.6–1.3)
EOSINOPHIL # BLD AUTO: 0.22 THOUSAND/ΜL (ref 0–0.61)
EOSINOPHIL NFR BLD AUTO: 3 % (ref 0–6)
ERYTHROCYTE [DISTWIDTH] IN BLOOD BY AUTOMATED COUNT: 13.5 % (ref 11.6–15.1)
GFR SERPL CREATININE-BSD FRML MDRD: 31 ML/MIN/1.73SQ M
GLUCOSE SERPL-MCNC: 99 MG/DL (ref 65–140)
HCT VFR BLD AUTO: 37.8 % (ref 36.5–49.3)
HGB BLD-MCNC: 12.7 G/DL (ref 12–17)
IMM GRANULOCYTES # BLD AUTO: 0.1 THOUSAND/UL (ref 0–0.2)
IMM GRANULOCYTES NFR BLD AUTO: 1 % (ref 0–2)
LYMPHOCYTES # BLD AUTO: 0.78 THOUSANDS/ΜL (ref 0.6–4.47)
LYMPHOCYTES NFR BLD AUTO: 9 % (ref 14–44)
MAGNESIUM SERPL-MCNC: 2.5 MG/DL (ref 1.6–2.6)
MCH RBC QN AUTO: 32.7 PG (ref 26.8–34.3)
MCHC RBC AUTO-ENTMCNC: 33.6 G/DL (ref 31.4–37.4)
MCV RBC AUTO: 97 FL (ref 82–98)
MONOCYTES # BLD AUTO: 0.81 THOUSAND/ΜL (ref 0.17–1.22)
MONOCYTES NFR BLD AUTO: 9 % (ref 4–12)
NEUTROPHILS # BLD AUTO: 6.97 THOUSANDS/ΜL (ref 1.85–7.62)
NEUTS SEG NFR BLD AUTO: 77 % (ref 43–75)
NRBC BLD AUTO-RTO: 0 /100 WBCS
PHOSPHATE SERPL-MCNC: 3.2 MG/DL (ref 2.3–4.1)
PLATELET # BLD AUTO: 159 THOUSANDS/UL (ref 149–390)
PMV BLD AUTO: 11.5 FL (ref 8.9–12.7)
POTASSIUM SERPL-SCNC: 4.3 MMOL/L (ref 3.5–5.3)
RBC # BLD AUTO: 3.88 MILLION/UL (ref 3.88–5.62)
SODIUM SERPL-SCNC: 132 MMOL/L (ref 136–145)
WBC # BLD AUTO: 8.93 THOUSAND/UL (ref 4.31–10.16)

## 2022-04-07 PROCEDURE — 85025 COMPLETE CBC W/AUTO DIFF WBC: CPT | Performed by: INTERNAL MEDICINE

## 2022-04-07 PROCEDURE — 83735 ASSAY OF MAGNESIUM: CPT | Performed by: INTERNAL MEDICINE

## 2022-04-07 PROCEDURE — 99239 HOSP IP/OBS DSCHRG MGMT >30: CPT | Performed by: INTERNAL MEDICINE

## 2022-04-07 PROCEDURE — 84100 ASSAY OF PHOSPHORUS: CPT | Performed by: INTERNAL MEDICINE

## 2022-04-07 PROCEDURE — 80048 BASIC METABOLIC PNL TOTAL CA: CPT | Performed by: INTERNAL MEDICINE

## 2022-04-07 RX ORDER — FUROSEMIDE 20 MG/1
20 TABLET ORAL 3 TIMES WEEKLY
Refills: 0
Start: 2022-04-08 | End: 2022-06-24 | Stop reason: ALTCHOICE

## 2022-04-07 RX ADMIN — PRAVASTATIN SODIUM 20 MG: 20 TABLET ORAL at 08:31

## 2022-04-07 RX ADMIN — HEPARIN SODIUM 5000 UNITS: 5000 INJECTION INTRAVENOUS; SUBCUTANEOUS at 05:55

## 2022-04-07 RX ADMIN — SENNOSIDES 17.2 MG: 8.6 TABLET, FILM COATED ORAL at 08:31

## 2022-04-07 RX ADMIN — ISOSORBIDE MONONITRATE 15 MG: 30 TABLET, EXTENDED RELEASE ORAL at 08:34

## 2022-04-07 RX ADMIN — METOPROLOL TARTRATE 25 MG: 25 TABLET, FILM COATED ORAL at 08:31

## 2022-04-07 RX ADMIN — CLOPIDOGREL BISULFATE 75 MG: 75 TABLET ORAL at 08:31

## 2022-04-07 RX ADMIN — ASPIRIN 81 MG: 81 TABLET, COATED ORAL at 08:31

## 2022-04-07 RX ADMIN — DOCUSATE SODIUM 100 MG: 100 CAPSULE, LIQUID FILLED ORAL at 08:31

## 2022-04-07 NOTE — CASE MANAGEMENT
Case Management Discharge Planning Note    Patient name Lazarus Stephenson  Location [facility] 905/[facility] 928-24 MRN [id number]  : 1926 Date 2022       Current Admission Date: 4/3/2022  Current Admission Diagnosis:Acute encephalopathy   Patient Active Problem List    Diagnosis Date Noted    Severe protein calorie malnutrition  2022    Thrombocytopenia  2022    Leukocytosis 2022    Acute encephalopathy 2022    Hypokalemia 2022    Atrial fibrillation possibly new onset 2022    Cognitive decline 2022    PVC's (premature ventricular contractions) 2022    Chronic kidney disease stage 4 2021    Chronic kidney disease-mineral and bone disorder 2021    Chronic combined systolic and diastolic CHF     CKD (chronic kidney disease) stage 4, GFR 15-29 ml/min ([facility]) 2013    Coronary artery disease 2013    Essential hypertension 2013    Benign hypertensive CKD, stage 1-4 or unspecified chronic kidney disease 2013    Hypercholesterolemia 10/08/2012      LOS (days): 3  Geometric Mean LOS (GMLOS) (days): 4 50  Days to GMLOS:1 8     OBJECTIVE:  Risk of Unplanned Readmission Score: 19         Current admission status: Inpatient   Preferred Pharmacy:   [address] [place] [address] [address]  [address] [place] [address]  Phone: [phone] Fax: [phone]    Primary Care Provider: Jenelle Keating DO    Primary Insurance: MEDICARE  Secondary Insurance: BLUE CROSS    DISCHARGE DETAILS:    Discharge planning discussed with[de-identified] Rossy Manuelaaraseli (son)  Freedom of Choice: Yes     CM contacted family/caregiver?: Yes  Were Treatment Team discharge recommendations reviewed with patient/caregiver?: Yes  Did patient/caregiver verbalize understanding of patient care needs?: Yes  Were patient/caregiver advised of the risks associated with not following Treatment Team discharge recommendations?: Yes    Contacts  Patient Contacts: whitney Cat  Contact Method: Phone  Phone Number: [phone]  Reason/Outcome: Continuity of Care,Emergency Contact,Discharge Planning,Referral              Other Referral/Resources/Interventions Provided:  Interventions: Short Term Rehab  Referral Comments: [facility] has accepted the patient - transport requested and scheduled for 1030 this am  COVID test resulted negative last evening         Treatment Team Recommendation: Short Term Rehab  Discharge Destination Plan[de-identified] Short Term Rehab  Transport at Discharge : [facility]  Dispatcher Contacted: Yes  Number/Name of Dispatcher: [facility]  Transported by [employer] and Unit #): [place]  ETA of Transport (Date): 04/07/22  ETA of Transport (Time): 7100

## 2022-04-08 ENCOUNTER — PATIENT OUTREACH (OUTPATIENT)
Dept: CASE MANAGEMENT | Facility: OTHER | Age: 87
End: 2022-04-08

## 2022-04-08 ENCOUNTER — NURSING HOME VISIT (OUTPATIENT)
Dept: GERIATRICS | Facility: OTHER | Age: 87
End: 2022-04-08
Payer: MEDICARE

## 2022-04-08 DIAGNOSIS — Z66 DNR (DO NOT RESUSCITATE): ICD-10-CM

## 2022-04-08 DIAGNOSIS — I10 HYPERTENSION, UNSPECIFIED TYPE: ICD-10-CM

## 2022-04-08 DIAGNOSIS — I25.10 CORONARY ARTERY DISEASE INVOLVING NATIVE CORONARY ARTERY OF NATIVE HEART WITHOUT ANGINA PECTORIS: ICD-10-CM

## 2022-04-08 DIAGNOSIS — E87.6 DRUG-INDUCED HYPOKALEMIA: ICD-10-CM

## 2022-04-08 DIAGNOSIS — T50.905A DRUG-INDUCED HYPOKALEMIA: ICD-10-CM

## 2022-04-08 DIAGNOSIS — E78.5 HYPERLIPIDEMIA, UNSPECIFIED HYPERLIPIDEMIA TYPE: ICD-10-CM

## 2022-04-08 DIAGNOSIS — G93.41 ACUTE METABOLIC ENCEPHALOPATHY: Primary | ICD-10-CM

## 2022-04-08 DIAGNOSIS — N18.4 CHRONIC RENAL DISEASE, STAGE IV (HCC): ICD-10-CM

## 2022-04-08 DIAGNOSIS — Z71.89 GOALS OF CARE, COUNSELING/DISCUSSION: ICD-10-CM

## 2022-04-08 DIAGNOSIS — I50.42 CHRONIC COMBINED SYSTOLIC AND DIASTOLIC HEART FAILURE (HCC): ICD-10-CM

## 2022-04-08 PROCEDURE — 99306 1ST NF CARE HIGH MDM 50: CPT | Performed by: INTERNAL MEDICINE

## 2022-04-08 NOTE — PROGRESS NOTES
Chart review reveals that patient was discharged to [facility] on 4/7/22 for STR  Patient was transferred via [facility]  This patient will followed by a colleague during [address]

## 2022-04-10 PROBLEM — Z66 DNR (DO NOT RESUSCITATE): Status: ACTIVE | Noted: 2022-04-10

## 2022-04-10 PROBLEM — I48.91 A-FIB (HCC): Status: RESOLVED | Noted: 2022-04-03 | Resolved: 2022-04-10

## 2022-04-10 PROBLEM — G93.41 ACUTE METABOLIC ENCEPHALOPATHY: Status: ACTIVE | Noted: 2022-04-03

## 2022-04-10 PROBLEM — D72.829 LEUKOCYTOSIS: Status: RESOLVED | Noted: 2022-04-04 | Resolved: 2022-04-10

## 2022-04-10 PROBLEM — T50.905A DRUG-INDUCED HYPOKALEMIA: Status: ACTIVE | Noted: 2022-04-03

## 2022-04-10 PROBLEM — Z71.89 GOALS OF CARE, COUNSELING/DISCUSSION: Status: ACTIVE | Noted: 2022-04-10

## 2022-04-10 PROBLEM — I49.1 PREMATURE ATRIAL COMPLEXES: Status: ACTIVE | Noted: 2022-04-10

## 2022-04-10 PROBLEM — I44.7 LEFT BUNDLE BRANCH BLOCK (LBBB) ON ELECTROCARDIOGRAM: Status: ACTIVE | Noted: 2022-04-10

## 2022-04-11 NOTE — ASSESSMENT & PLAN NOTE
· Will continue his prior to admission aspirin, clopidogrel, pravastatin, and isosorbide mononitrate as recommended by his cardiology service   · Will continue with monitoring for change in his condition   · He will follow up with his PCP and cardiology services upon discharge

## 2022-04-11 NOTE — ASSESSMENT & PLAN NOTE
· As of April 2022, baseline creatinine 2 1, admission creatinine/EGFR 2 07/26 and discharge creatinine/EGFR 1 78/31  · He will be admitted to the subacute rehabilitation facility under the acute kidney injury pathway   · Will continue with avoidance of nephrotoxic medications and supplements   · Will continue his care in collaboration with his nephrology service   · Will continue with clinical and periodic laboratory monitoring for change in his condition  · He will follow up with his Nephrology and PCP services upon discharge

## 2022-04-11 NOTE — ASSESSMENT & PLAN NOTE
· Secondary to furosemide  · Will continue with potassium supplement  · Will continue with periodic laboratory monitoring for change in his condition  · He will follow up with his PCP upon discharge

## 2022-04-11 NOTE — ASSESSMENT & PLAN NOTE
· Improved after acute care hospitalization   · Likely secondary to cardiomyopathy with chronic combined systolic and diastolic congestive heart failure with hypoperfusion of his brain  · Cardiology service recommended lowering the dosages of his metoprolol tartrate and furosemide  · Furosemide now to be given 3 times weekly  · He has experienced a decline in his functioning related to his acute on chronic medical conditions   · He will be admitted to the subacute rehabilitation facility and seen in consultation by a multidisciplinary rehabilitation team for evaluation and treatment to assist him in returning to his prior level functioning  · Will continue with clinical and periodic laboratory monitoring for change in his condition  · He will follow up with his PCP upon discharge

## 2022-04-11 NOTE — ASSESSMENT & PLAN NOTE
· Will continue his prior to admission pravastatin   · He will follow up with his PCP and cardiology services upon discharge

## 2022-04-11 NOTE — ASSESSMENT & PLAN NOTE
· As discussed with his son, his father's goal at this time is to participate in a rehabilitation program to assist him in returning to his assisted living facility and then consult hospice care

## 2022-04-11 NOTE — ASSESSMENT & PLAN NOTE
· Left ventricular function severely reduced with an ejection fraction of 25% seen on echocardiogram of February 26, 2022   · He was seen in consultation by the cardiology service during his hospitalization   · Recommendation to continue with furosemide, but dosed 3 times weekly  · To consider discontinuation of isosorbide, if he experiences hypotension  · Cardiology service has recommended consultation by hospice care team  · Will continue his care in collaboration with his cardiology and nephrology services  · He will follow up with his Cardiology, Nephrology, and PCP services upon discharge  · Will continue with clinical and periodic laboratory monitoring for change in his condition

## 2022-04-11 NOTE — PROGRESS NOTES
Andre [address]  [address]  Facility:  [facility]  31    HISTORY AND PHYSICAL    NAME: Martin Lancaster  AGE: 80 y o  SEX: male    DATE OF ENCOUNTER: 4/8/2022    Code status:  DNR    Assessment and Plan     1  Acute metabolic encephalopathy  Assessment & Plan:  · Improved after acute care hospitalization   · Likely secondary to cardiomyopathy with chronic combined systolic and diastolic congestive heart failure with hypoperfusion of his brain  · Cardiology service recommended lowering the dosages of his metoprolol tartrate and furosemide  · Furosemide now to be given 3 times weekly  · He has experienced a decline in his functioning related to his acute on chronic medical conditions   · He will be admitted to the subacute rehabilitation facility and seen in consultation by a multidisciplinary rehabilitation team for evaluation and treatment to assist him in returning to his prior level functioning  · Will continue with clinical and periodic laboratory monitoring for change in his condition  · He will follow up with his PCP upon discharge      2   Chronic combined systolic and diastolic CHF  Assessment & Plan:  · Left ventricular function severely reduced with an ejection fraction of 25% seen on echocardiogram of February 26, 2022   · He was seen in consultation by the cardiology service during his hospitalization   · Recommendation to continue with furosemide, but dosed 3 times weekly  · To consider discontinuation of isosorbide, if he experiences hypotension  · Cardiology service has recommended consultation by hospice care team  · Will continue his care in collaboration with his cardiology and nephrology services  · He will follow up with his Cardiology, Nephrology, and PCP services upon discharge  · Will continue with clinical and periodic laboratory monitoring for change in his condition            3  Drug-induced hypokalemia  Assessment & Plan:  · Secondary to furosemide  · Will continue with potassium supplement  · Will continue with periodic laboratory monitoring for change in his condition  · He will follow up with his PCP upon discharge      4  Chronic kidney disease stage 4  Assessment & Plan:  · As of April 2022, baseline creatinine 2 1, admission creatinine/EGFR 2 07/26 and discharge creatinine/EGFR 1 78/31  · He will be admitted to the subacute rehabilitation facility under the acute kidney injury pathway   · Will continue with avoidance of nephrotoxic medications and supplements   · Will continue his care in collaboration with his nephrology service   · Will continue with clinical and periodic laboratory monitoring for change in his condition  · He will follow up with his Nephrology and PCP services upon discharge      5  Coronary artery disease involving native coronary artery of native heart without angina pectoris  Assessment & Plan:  · Will continue his prior to admission aspirin, clopidogrel, pravastatin, and isosorbide mononitrate as recommended by his cardiology service   · Will continue with monitoring for change in his condition   · He will follow up with his PCP and cardiology services upon discharge      6  Hypertension, unspecified type  Assessment & Plan:  · Will continue his prior to admission metoprolol tartrate   · As discussed with his son, his father's valsartan was discontinued during one of his recent hospitalizations   · Cardiology service recommends discontinuing isosorbide, if he has trouble with hypotension  · Will continue with monitoring for change in his condition   · He will follow up with PCP and cardiology services upon discharge      7  Hyperlipidemia, unspecified hyperlipidemia type  Assessment & Plan:  · Will continue his prior to admission pravastatin   · He will follow up with his PCP and cardiology services upon discharge      8   Goals of care, counseling/discussion  Assessment & Plan:  · As discussed with his son, his father's goal at this time is to participate in a rehabilitation program to assist him in returning to his assisted living facility and then consult hospice care      9  DNR (do not resuscitate)  Assessment & Plan:  · As discussed with his son, his resuscitation status is do not resuscitate        All medications and routine orders were reviewed and updated as needed  Plan discussed with:  Patient, nursing staff, and son, Luis Means  Chief Complaint     He is seen for visit to perform a history and physical exam to be admitted to the subacute rehabilitation facility  History of Present Illness     History is obtained from medical record review, patient interview, and phone interview of his son, Luis Means  He is a pleasant 49-year-old gentleman with the comorbidities of chronic combined systolic and diastolic congestive heart failure, stage 4 chronic kidney disease, coronary artery disease status post CABG, drug induced hypokalemia, hyperlipidemia, hypertension, and PACs who is seen for a visit to perform a history and physical exam to be admitted to the subacute rehabilitation facility  He presented to the emergency room from his assisted living facility on April 3, 2022 secondary to a change in his mental status  Per notes, he was difficult to arise after he returned to his room to take a nap  Evaluation in the emergency room was nonfocal   He was admitted to the acute care hospital from April 3, 2020 through 2 through April 7, 2022  He was seen in consultation by the Neurology, Cardiology and therapy Services during his hospitalization  Neurology recommended MRI/MRA imaging and EEG monitoring  Testing did not reveal a source of his mental status change  His condition improved during his hospitalization  He was seen in consultation by Cardiology regarding concern regarding atrial fibrillation, but ECG read as normal sinus rhythm with premature atrial contractions    Son notes a decline in his father's cognition over the last couple months with multiple hospitalizations  He notes that his father's physicians have stated this is likely secondary to hypoperfusion from his cardiomyopathy  His father has had 3 hospice admissions in the last 2 months  Therapy service recommended a post acute rehabilitation stay prior to returning to his assisted living facility  He was transferred to the subacute rehabilitation facility on April 7, 2022  He reports blurry vision in his right eye without loss of vision or light flashes      HISTORY:  Past Medical History:   Diagnosis Date    Benign hypertensive CKD, stage 1-4 or unspecified chronic kidney disease 5/11/2013    Chronic renal disease, stage III (HCC)     Chronic renal disease, stage III (HCC)     Essential hypertension, benign     Essential hypertension, benign      Past Surgical History:   Procedure Laterality Date    CORONARY ARTERY BYPASS GRAFT  2012    x4     Family History   Problem Relation Age of Onset    No Known Problems Mother     No Known Problems Father      Social History     Socioeconomic History    Marital status: /Civil Union     Spouse name: Not on file    Number of children: Not on file    Years of education: Not on file    Highest education level: Not on file   Occupational History    Occupation: RETIRED   Tobacco Use    Smoking status: Never Smoker    Smokeless tobacco: Never Used   Vaping Use    Vaping Use: Never used   Substance and Sexual Activity    Alcohol use: Not Currently    Drug use: No    Sexual activity: Not on file   Other Topics Concern    Not on file   Social History Narrative    Not on file     Social Determinants of Health     Financial Resource Strain: Not on file   Food Insecurity: No Food Insecurity    Worried About Running Out of Food in the Last Year: Never true    Nasreen of Food in the Last Year: Never true   Transportation Needs: No Transportation Needs    Lack of Transportation (Medical): No    Lack of Transportation (Non-Medical): No   Physical Activity: Not on file   Stress: Not on file   Social Connections: Not on file   Intimate Partner Violence: Not on file   Housing Stability: Low Risk     Unable to Pay for Housing in the Last Year: No    Number of Places Lived in the Last Year: 1    Unstable Housing in the Last Year: No       Allergies: Allergies   Allergen Reactions    Loratadine     Nitrofurantoin     Penicillins     Phenazopyridine        Review of Systems     Review of Systems   Unable to perform ROS: Other     Cognitive impairment    Medications and orders     All medications reviewed and updated in Nursing Home EMR  Objective     Vitals:  Weight 126 7 lb, temperature 97 9° F, pulse 80, blood pressure 113/78, pulse oximetry 94% on room air  Physical Exam  Vitals reviewed  Constitutional:       General: He is awake  He is not in acute distress  Appearance: He is well-developed and underweight  He is not ill-appearing, toxic-appearing or diaphoretic  Comments: Appears comfortable lying in bed, his stated age, and frail  HENT:      Head: Normocephalic  Nose: Nose normal       Mouth/Throat:      Mouth: Mucous membranes are moist    Eyes:      General: No scleral icterus  Conjunctiva/sclera: Conjunctivae normal    Cardiovascular:      Rate and Rhythm: Normal rate and regular rhythm  Heart sounds: Normal heart sounds  No murmur heard  No friction rub  No gallop  Comments: There is no pretibial edema, bilaterally  Pulmonary:      Effort: Pulmonary effort is normal  No respiratory distress  Breath sounds: Normal breath sounds  No stridor  No wheezing, rhonchi or rales  Abdominal:      General: Abdomen is flat  There is no distension  Palpations: Abdomen is soft  There is no mass  Tenderness: There is no abdominal tenderness  There is no guarding or rebound     Musculoskeletal:      Cervical back: Neck supple  Right lower leg: No edema  Left lower leg: No edema  Skin:     Comments: Healing skin tears on left shin  Neurological:      Mental Status: He is alert  Psychiatric:         Mood and Affect: Mood normal          Behavior: Behavior normal  Behavior is cooperative  Pertinent Laboratory/Diagnostic Studies: The following labs/studies were reviewed please see chart or hospital paperwork for details  Lab Results   Component Value Date    WBC 8 93 04/07/2022    HGB 12 7 04/07/2022    HCT 37 8 04/07/2022    MCV 97 04/07/2022     04/07/2022     Lab Results   Component Value Date    SODIUM 132 (L) 04/07/2022    K 4 3 04/07/2022    CL 99 (L) 04/07/2022    CO2 25 04/07/2022    BUN 49 (H) 04/07/2022    CREATININE 1 78 (H) 04/07/2022    GLUC 99 04/07/2022    CALCIUM 9 3 04/07/2022 April 4, 2022:     MRI of brain without contrast:     IMPRESSION:      1  No acute infarction, intracranial hemorrhage or mass effect  2   Mild, chronic microangiopathy  MRA of brain without contrast:     IMPRESSION:     Poorly visualized midportion of the M1 segment of the right middle cerebral artery, nonvisualized basilar artery with poor flow related enhancement of both vertebral arteries as well as both posterior cerebral arteries     Findings may be related to   markedly slow flow and/or vascular occlusion/high-grade stenosis  Correlation for signs and symptoms of vertebrobasilar insufficiency  Consider CTA of the neck and brain for further assessment, as clinically indicated    MRA of carotids without contrast:     IMPRESSION:     Motion degraded MRA of the neck without hemodynamically significant stenosis in the major arteries of the neck  February 26, 2022:     2D transthoracic echocardiogram:     Interpretation Summary         Left Ventricle: Left ventricular cavity size is dilated  The left ventricular ejection fraction is 25%  Systolic function is severely reduced   Wall motion is normal  There is severe global hypokinesis with regional variation  Diastolic function is abnormal   Left atrial filling pressure is elevated  Wall thickness is increased    Right Ventricle: Systolic function is normal     Left Atrium: The atrium is mildly dilated    Right Atrium: The atrium is mildly dilated    Aortic Valve: There is mild to moderate regurgitation    Mitral Valve: There is moderate regurgitation    Tricuspid Valve: There is mild to moderate regurgitation  The estimated right ventricular systolic pressure is 31 62 mmHg    Pericardium: There is a moderately sized left pleural effusion      April 3, 2022:     ECG 12 lead    Status: Final result       Study Result    Narrative & Impression    Age and gender specific ECG analysis   Sinus rhythm with frequent Premature atrial complexes  Left axis deviation  Left bundle branch block  Abnormal ECG  When compared with ECG of 06-MAR-2022 00:22,  Premature ventricular complexes No longer present  Premature atrial complexes are now Present  Confirmed by Ana Eric ([id number]) on 4/5/2022 5:17:40 PM       - Admission order summary reviewed and signed  Portions of the record may have been created with voice recognition software  Occasional wrong word or "sound a like" substitutions may have occurred due to the inherent limitations of voice recognition software  Read the chart carefully and recognize, using context, where substitutions have occurred      BIN Coe   4/10/2022 10:41 PM

## 2022-04-11 NOTE — ASSESSMENT & PLAN NOTE
· Will continue his prior to admission metoprolol tartrate   · As discussed with his son, his father's valsartan was discontinued during one of his recent hospitalizations   · Cardiology service recommends discontinuing isosorbide, if he has trouble with hypotension  · Will continue with monitoring for change in his condition   · He will follow up with PCP and cardiology services upon discharge

## 2022-04-13 ENCOUNTER — PATIENT OUTREACH (OUTPATIENT)
Dept: CASE MANAGEMENT | Facility: HOSPITAL | Age: 87
End: 2022-04-13

## 2022-04-18 ENCOUNTER — NURSING HOME VISIT (OUTPATIENT)
Dept: GERIATRICS | Facility: OTHER | Age: 87
End: 2022-04-18
Payer: MEDICARE

## 2022-04-18 VITALS
HEART RATE: 90 BPM | SYSTOLIC BLOOD PRESSURE: 104 MMHG | TEMPERATURE: 97.1 F | RESPIRATION RATE: 18 BRPM | DIASTOLIC BLOOD PRESSURE: 72 MMHG | BODY MASS INDEX: 21.13 KG/M2 | WEIGHT: 127 LBS

## 2022-04-18 DIAGNOSIS — I50.42 CHRONIC COMBINED SYSTOLIC AND DIASTOLIC HEART FAILURE (HCC): Primary | ICD-10-CM

## 2022-04-18 DIAGNOSIS — N18.4 CHRONIC RENAL DISEASE, STAGE IV (HCC): ICD-10-CM

## 2022-04-18 DIAGNOSIS — I10 HYPERTENSION, UNSPECIFIED TYPE: ICD-10-CM

## 2022-04-18 DIAGNOSIS — R41.89 COGNITIVE DECLINE: ICD-10-CM

## 2022-04-18 DIAGNOSIS — K05.6 SORE GUMS: ICD-10-CM

## 2022-04-18 PROCEDURE — 99309 SBSQ NF CARE MODERATE MDM 30: CPT | Performed by: NURSE PRACTITIONER

## 2022-04-18 NOTE — PROGRESS NOTES
[address]   [phone]  [facility]  STR Progress Note        NAME: Duy Carbajal  AGE: 80 y o  SEX: male    DATE OF ENCOUNTER: 4/18/2022    Assessment and Plan     1  Chronic combined systolic and diastolic CHF  Assessment & Plan:  · asymtomatic  · Continue with furosemide  · Monitor weights  · Monitor for acute changes        2  Hypertension, unspecified type  Assessment & Plan:  · Controlled  · Continue with metoprolol, furosemide and isosorbide  · Continue to monitor BP      3  Chronic kidney disease stage 4  Assessment & Plan:  · Labs returning to baseline  · Continue to avoid nephrotoxins as much as possible  · Monitor BMP periodically for renal function      4  Cognitive decline  Assessment & Plan:  · Bouts of confusion  · Reorient and redirect when needed  · Continue with supportive care  · F/u with PCP as out patient      5  Sore gums  Assessment & Plan:  · C/o soreness in mouth  · Pt pointed toward bottom gum line  · Poor dentition   · Ordered Anbesol TID PRN for pain on gums  · F/u with dentist as out patient        No orders of the defined types were placed in this encounter  History of Present Illness     Duy Carbajal 80 y o  male seen for follow-up of care and treatment plan for ambulatory dysfunction doing well at [address], CHF asymptomatic, HTN doing well with BP meds, CKD4 at baseline, cognitive decline at baseline, sore gums controlled with anbesol     The following portions of the patient's history were reviewed and updated as appropriate: allergies, current medications, past family history, past medical history, past social history, past surgical history and problem list     Review of Systems     Review of Systems   Constitutional: Negative for chills and fever  HENT: Positive for dental problem and mouth sores  Negative for ear pain and sore throat  Eyes: Negative for pain and visual disturbance     Respiratory: Negative for cough, shortness of breath and wheezing  Cardiovascular: Negative for chest pain and palpitations  Gastrointestinal: Negative for abdominal pain and vomiting  Genitourinary: Negative for dysuria and hematuria  Musculoskeletal: Positive for gait problem  Negative for arthralgias and back pain  Skin: Negative for color change and rash  Neurological: Positive for weakness  Negative for seizures and syncope  All other systems reviewed and are negative  Objective     /72   Pulse 90   Temp (!) 97 1 °F (36 2 °C)   Resp 18   Wt 57 6 kg (127 lb)   BMI 21 13 kg/m²     Physical Exam  Vitals reviewed  Constitutional:       Appearance: He is well-developed  HENT:      Head: Normocephalic and atraumatic  Mouth/Throat:      Dentition: Abnormal dentition  Gum lesions present  Eyes:      Conjunctiva/sclera: Conjunctivae normal    Cardiovascular:      Rate and Rhythm: Normal rate and regular rhythm  Heart sounds: Normal heart sounds, S1 normal and S2 normal  No murmur heard  Pulmonary:      Effort: Pulmonary effort is normal  No respiratory distress  Breath sounds: Normal breath sounds  No wheezing  Abdominal:      General: Bowel sounds are normal  There is no distension  Palpations: Abdomen is soft  Tenderness: There is no abdominal tenderness  Musculoskeletal:         General: Normal range of motion  Cervical back: Normal range of motion  Comments: Generalized weakness   Skin:     General: Skin is warm and dry  Neurological:      Mental Status: He is alert  Psychiatric:         Attention and Perception: Attention normal          Mood and Affect: Mood normal          Speech: Speech normal          Behavior: Behavior normal          Thought Content: Thought content normal          Cognition and Memory: Cognition is impaired           Pertinent Laboratory/Diagnostic Studies:  HFM Labs Reviewed    Current Medications   Medication list reviewed and updated today  Please see paper chart for updated medication list      Marlene Ba  25:28 PM      Name: Amanda Boyle  : 1926  MRN: [id number]  DOS: 2022    Diagnoses:   Diagnosis ICD-10-CM Associated Orders   1  Chronic combined systolic and diastolic CHF  J43 83    2  Hypertension, unspecified type  I10    3  Chronic kidney disease stage 4  N18 4    4  Cognitive decline  R41 89    5   Sore gums  K05 6

## 2022-04-18 NOTE — ASSESSMENT & PLAN NOTE
· Bouts of confusion  · Reorient and redirect when needed  · Continue with supportive care  · F/u with PCP as out patient

## 2022-04-18 NOTE — ASSESSMENT & PLAN NOTE
· C/o soreness in mouth  · Pt pointed toward bottom gum line  · Poor dentition   · Ordered Anbesol TID PRN for pain on gums  · F/u with dentist as out patient

## 2022-04-18 NOTE — ASSESSMENT & PLAN NOTE
· Labs returning to baseline  · Continue to avoid nephrotoxins as much as possible  · Monitor BMP periodically for renal function

## 2022-04-20 ENCOUNTER — PATIENT OUTREACH (OUTPATIENT)
Dept: CASE MANAGEMENT | Facility: HOSPITAL | Age: 87
End: 2022-04-20

## 2022-04-25 ENCOUNTER — NURSING HOME VISIT (OUTPATIENT)
Dept: GERIATRICS | Facility: OTHER | Age: 87
End: 2022-04-25
Payer: MEDICARE

## 2022-04-25 DIAGNOSIS — I50.42 CHRONIC COMBINED SYSTOLIC AND DIASTOLIC HEART FAILURE (HCC): ICD-10-CM

## 2022-04-25 DIAGNOSIS — G93.41 METABOLIC ENCEPHALOPATHY: Primary | ICD-10-CM

## 2022-04-25 DIAGNOSIS — Z71.89 GOALS OF CARE, COUNSELING/DISCUSSION: ICD-10-CM

## 2022-04-25 DIAGNOSIS — N18.4 CHRONIC RENAL DISEASE, STAGE IV (HCC): ICD-10-CM

## 2022-04-25 DIAGNOSIS — Z66 DNR (DO NOT RESUSCITATE): ICD-10-CM

## 2022-04-25 PROCEDURE — 99310 SBSQ NF CARE HIGH MDM 45: CPT | Performed by: INTERNAL MEDICINE

## 2022-04-25 NOTE — PROGRESS NOTES
[address]  [address] [place] [address]  Facility:  [facility]  31  Acute visit    NAME: Lazarus Stephenson  AGE: 80 y o  SEX: male    DATE OF ENCOUNTER: 4/25/2022    Code status:  DNR    Assessment and Plan     1  Metabolic encephalopathy  Assessment & Plan:  · Secondary to his acute on chronic medical conditions of chronic heart failure and chronic kidney disease   · See goals of care heading for further information  · Will continue with care/support of his ADLs at the subacute rehabilitation facility   · Will continue with monitoring for change in his condition      2  Chronic combined systolic and diastolic CHF  Assessment & Plan:  · Will continue with his current furosemide therapy   · Will provide supplemental oxygen, as needed  · Will continue with monitoring for change in his condition          3  Chronic kidney disease stage 4  Assessment & Plan:  · April 18, 2022: Creatinine/EGFR:  1 48/43  · Will continue with current care plan  · See goals of care heading for further information      4  Goals of care, counseling/discussion  Assessment & Plan:  · After discussing his conditions and options for care, his son would like his father transitioned to hospice care      5  DNR (do not resuscitate)    See my history and physical note of April 8, 2022 for further assessment and plan  All medications and routine orders were reviewed and updated as needed  Plan discussed with:  Nursing staff and son, Rossy Howard  I have spent 45 minutes with Patient and family today in which greater than 50% of this time was spent in counseling/coordination of care regarding Prognosis, Risks and benefits of tx options, Patient and family education and Impressions      Chief Complaint     He is seen for a follow-up visit to update the care and treatment of his metabolic encephalopathy, chronic combined systolic and diastolic congestive heart failure, and stage 4 chronic kidney disease  History of Present Illness     He is a 29-year-old gentleman with the comorbidities of metabolic encephalopathy secondary to his acute on chronic medical conditions, chronic combined systolic and diastolic congestive heart failure, and stage 4 chronic kidney disease  Nursing staff made me aware that he is occasionally requiring supplemental oxygen  The following portions of the patient's history were reviewed and updated as appropriate: current medications, past family history, past medical history, past social history, past surgical history and problem list     Allergies: Allergies   Allergen Reactions    Loratadine     Nitrofurantoin     Penicillins     Phenazopyridine        Review of Systems     Review of Systems   Unable to perform ROS: Other   Encephalopathy    Medications and orders     All medications reviewed and updated in Nursing Home EMR  Objective     Vitals:  Temperature 97 2 degrees Fahrenheit, weight 129 pounds, pulse 97, blood pressure 118/82, pulse oximetry 97 percent on supplemental oxygen  Physical Exam  Vitals reviewed  Constitutional:       General: He is awake  He is not in acute distress  Appearance: He is well-developed  He is not toxic-appearing or diaphoretic  Comments: He appears comfortable sitting by his bedside, stated age, and frail  Pulmonary:      Effort: Pulmonary effort is normal    Neurological:      Mental Status: He is alert  Pertinent Laboratory/Diagnostic Studies: The following labs were reviewed please see chart or hospital paperwork for details  April 18, 2022: BMP:  Sodium 132, potassium 4 3, BUN 30, creatinine 1 48, fasting blood sugar 86          Portions of the record may have been created with voice recognition software  Occasional wrong word or "sound a like" substitutions may have occurred due to the inherent limitations of voice recognition software    Read the chart carefully and recognize, using context, where substitutions have occurred      BIN Neff   5/15/2022 2:43 PM

## 2022-04-27 ENCOUNTER — PATIENT OUTREACH (OUTPATIENT)
Dept: CASE MANAGEMENT | Facility: HOSPITAL | Age: 87
End: 2022-04-27

## 2022-05-04 ENCOUNTER — PATIENT OUTREACH (OUTPATIENT)
Dept: CASE MANAGEMENT | Facility: HOSPITAL | Age: 87
End: 2022-05-04

## 2022-05-15 NOTE — ASSESSMENT & PLAN NOTE
· Secondary to his acute on chronic medical conditions of chronic heart failure and chronic kidney disease   · See goals of care heading for further information  · Will continue with care/support of his ADLs at the subacute rehabilitation facility   · Will continue with monitoring for change in his condition

## 2022-05-15 NOTE — ASSESSMENT & PLAN NOTE
· Will continue with his current furosemide therapy   · Will provide supplemental oxygen, as needed  · Will continue with monitoring for change in his condition

## 2022-05-15 NOTE — ASSESSMENT & PLAN NOTE
· After discussing his conditions and options for care, his son would like his father transitioned to hospice care

## 2022-05-15 NOTE — ASSESSMENT & PLAN NOTE
· April 18, 2022: Creatinine/EGFR:  1 48/43  · Will continue with current care plan  · See goals of care heading for further information

## 2022-05-31 ENCOUNTER — PATIENT OUTREACH (OUTPATIENT)
Dept: CASE MANAGEMENT | Facility: HOSPITAL | Age: 87
End: 2022-05-31

## 2022-05-31 ENCOUNTER — PATIENT OUTREACH (OUTPATIENT)
Dept: CASE MANAGEMENT | Facility: OTHER | Age: 87
End: 2022-05-31

## 2022-05-31 NOTE — PROGRESS NOTES
Chart review reveals that patient remains at [facility] under the care of [facility] services  BPCI episode end  The episode has been resolved  I removed myself from the care team and the care coordination note has been updated

## 2022-06-23 ENCOUNTER — NURSING HOME VISIT (OUTPATIENT)
Dept: GERIATRICS | Facility: OTHER | Age: 87
End: 2022-06-23
Payer: MEDICARE

## 2022-06-23 DIAGNOSIS — Z66 DNR (DO NOT RESUSCITATE): ICD-10-CM

## 2022-06-23 DIAGNOSIS — R54 FRAILTY SYNDROME IN GERIATRIC PATIENT: Primary | ICD-10-CM

## 2022-06-23 DIAGNOSIS — Z51.5 END OF LIFE CARE: ICD-10-CM

## 2022-06-23 DIAGNOSIS — N18.4 CHRONIC RENAL DISEASE, STAGE IV (HCC): ICD-10-CM

## 2022-06-23 DIAGNOSIS — I50.42 CHRONIC COMBINED SYSTOLIC AND DIASTOLIC HEART FAILURE (HCC): ICD-10-CM

## 2022-06-23 PROCEDURE — 99309 SBSQ NF CARE MODERATE MDM 30: CPT | Performed by: INTERNAL MEDICINE

## 2022-06-23 RX ORDER — MORPHINE SULFATE 100 MG/5ML
5 SOLUTION ORAL
Qty: 30 ML | Refills: 0 | Status: SHIPPED | OUTPATIENT
Start: 2022-06-23

## 2022-06-23 RX ORDER — LORAZEPAM 2 MG/ML
1 CONCENTRATE ORAL EVERY 4 HOURS PRN
Qty: 30 ML | Refills: 0 | Status: SHIPPED | OUTPATIENT
Start: 2022-06-23

## 2022-06-23 NOTE — PROGRESS NOTES
Andre [address]  [address]  Facility:  [facility]  32  Follow-up visit  Hospice care    NAME: Sukhdeep Lemon  AGE: 80 y o  SEX: male    DATE OF ENCOUNTER: 6/23/2022    Code status:  DNR/Hospice Care    Assessment and Plan     1  Frailty syndrome in geriatric patient  Assessment & Plan:  · Secondary to his chronic medical conditions   · He continues to require 24/7 care/support of his ADLs  · He is level 8 on the clinical frailty scale consistent with being very severely frail   · Will continue his care in collaboration with the hospice care service   · I recommend that he continue with care/support as a long-term resident at the nursing facility   · Will continue to monitor for change in his condition      2  End of life care  Assessment & Plan:  · Nursing reports that he is not taking meaningful nourishment over the last 2-3 days and is refusing his medications when they are offered to him  · Will order comfort medications of as needed liquid lorazepam and as needed liquid morphine  · Will continue his care in collaboration with the hospice care team  · Will continue with monitoring for change in his condition    Orders:  -     LORazepam (ATIVAN) 2 mg/mL concentrated solution; Take 0 5 mL (1 mg total) by mouth every 4 (four) hours as needed for anxiety (Restlessness)  -     morphine 20 mg/mL concentrated solution; Take 0 25 mL (5 mg total) by mouth every hour as needed (Pain, shortness of breath, restlessness) Max Daily Amount: 120 mg    3   Chronic combined systolic and diastolic CHF  Assessment & Plan:  · He appears comfortable without medication   · He continues with comfort care in collaboration with the hospice care team  · Will continue with monitoring for change in his condition            4  Chronic kidney disease stage 4  Assessment & Plan:  · He appears comfortable  · He continues with comfort care in collaboration with the hospice care team  · Will continue with monitoring for change in his condition      5  DNR (do not resuscitate)    See my history and physical note of April 8, 2022 for further assessment and plan  All medications and routine orders were reviewed and updated as needed  Plan discussed with:  Nursing staff  I have spent 30 minutes with patient and nursing staff today in which greater than 50% of this time was spent in counseling/coordination of care regarding Prognosis, Intructions for management and Impressions  Chief Complaint     He is seen for a follow-up visit to update the care and treatment of his frailty secondary to his chronic medical conditions, chronic combined systolic and diastolic congestive heart failure, and stage 4 chronic kidney disease  History of Present Illness     He is a 80-year-old gentleman who is seen for a follow-up visit to update the care and treatment of his frailty secondary to his chronic medical conditions-he continues to require 24/7 care/support of his ADLs  He has transitioned to hospice level of care for his chronic combined systolic and diastolic congestive heart failure and stage 4 chronic kidney disease  The nursing staff endorses that his appetite is poor, that he is sleeping well, and that he is having no difficulty constipation  He requires assistance with his ADLs  Nursing notes that he occasionally yells out at night  Nursing reports that he is not taking meaningful nourishment over the last 2 or 3 days and has been spitting out his medications when they are offered to him  The following portions of the patient's history were reviewed and updated as appropriate: current medications, past family history, past medical history, past social history, past surgical history and problem list     Allergies:   Allergies   Allergen Reactions    Loratadine     Nitrofurantoin     Penicillins     Phenazopyridine        Review of Systems     Review of Systems   Unable to perform ROS: Patient nonverbal       Medications and orders     All medications reviewed and updated in Nursing Home EMR  Objective     Vitals:  Vitals were not taken for the visit  Physical Exam  Constitutional:       General: He is not in acute distress  Appearance: He is well-developed  He is not toxic-appearing or diaphoretic  Comments: He appears comfortable lying in bed, stated age, and very frail  He is stuporous  He opens his eyes when his name is called, but quickly closes them  HENT:      Head: Normocephalic  Cardiovascular:      Rate and Rhythm: Normal rate and regular rhythm  Heart sounds: Normal heart sounds  No murmur heard  No friction rub  No gallop  Comments: There is no pretibial edema, bilaterally  Pulmonary:      Effort: No respiratory distress  Breath sounds: Normal breath sounds  No stridor  No wheezing, rhonchi or rales  Psychiatric:         Speech: He is noncommunicative  - His order summary was reviewed and signed  Portions of the record may have been created with voice recognition software  Occasional wrong word or "sound a like" substitutions may have occurred due to the inherent limitations of voice recognition software  Read the chart carefully and recognize, using context, where substitutions have occurred      BIN Fernandez   6/24/2022 2:29 PM

## 2022-06-24 PROBLEM — R54 FRAILTY SYNDROME IN GERIATRIC PATIENT: Status: ACTIVE | Noted: 2022-06-24

## 2022-06-24 NOTE — ASSESSMENT & PLAN NOTE
· Secondary to his chronic medical conditions   · He continues to require 24/7 care/support of his ADLs  · He is level 8 on the clinical frailty scale consistent with being very severely frail   · Will continue his care in collaboration with the hospice care service   · I recommend that he continue with care/support as a long-term resident at the nursing facility   · Will continue to monitor for change in his condition

## 2022-06-24 NOTE — ASSESSMENT & PLAN NOTE
· He appears comfortable  · He continues with comfort care in collaboration with the hospice care team  · Will continue with monitoring for change in his condition

## 2022-06-24 NOTE — ASSESSMENT & PLAN NOTE
· Nursing reports that he is not taking meaningful nourishment over the last 2-3 days and is refusing his medications when they are offered to him  · Will order comfort medications of as needed liquid lorazepam and as needed liquid morphine  · Will continue his care in collaboration with the hospice care team  · Will continue with monitoring for change in his condition

## 2022-06-24 NOTE — ASSESSMENT & PLAN NOTE
· He appears comfortable without medication   · He continues with comfort care in collaboration with the hospice care team  · Will continue with monitoring for change in his condition